# Patient Record
Sex: FEMALE | Race: WHITE | Employment: OTHER | URBAN - METROPOLITAN AREA
[De-identification: names, ages, dates, MRNs, and addresses within clinical notes are randomized per-mention and may not be internally consistent; named-entity substitution may affect disease eponyms.]

---

## 2017-01-05 ENCOUNTER — APPOINTMENT (OUTPATIENT)
Dept: CT IMAGING | Age: 72
End: 2017-01-05
Attending: EMERGENCY MEDICINE
Payer: MEDICARE

## 2017-01-05 ENCOUNTER — HOSPITAL ENCOUNTER (OUTPATIENT)
Age: 72
Setting detail: OBSERVATION
LOS: 1 days | Discharge: HOME | End: 2017-01-06
Attending: EMERGENCY MEDICINE | Admitting: INTERNAL MEDICINE
Payer: MEDICARE

## 2017-01-05 DIAGNOSIS — R47.02 DYSPHASIA: ICD-10-CM

## 2017-01-05 DIAGNOSIS — I63.9 CEREBROVASCULAR ACCIDENT (CVA), UNSPECIFIED MECHANISM (HCC): Primary | ICD-10-CM

## 2017-01-05 DIAGNOSIS — I65.21 CAROTID STENOSIS, RIGHT: ICD-10-CM

## 2017-01-05 PROBLEM — R47.01 APHASIA: Status: ACTIVE | Noted: 2017-01-05

## 2017-01-05 LAB
ALBUMIN SERPL BCP-MCNC: 2.9 G/DL (ref 3.2–4.6)
ALBUMIN/GLOB SERPL: 0.9 {RATIO} (ref 1.2–3.5)
ALP SERPL-CCNC: 65 U/L (ref 50–136)
ALT SERPL-CCNC: 24 U/L (ref 12–65)
ANION GAP BLD CALC-SCNC: 7 MMOL/L (ref 7–16)
AST SERPL W P-5'-P-CCNC: 13 U/L (ref 15–37)
BASOPHILS # BLD AUTO: 0 K/UL (ref 0–0.2)
BASOPHILS # BLD: 0 % (ref 0–2)
BILIRUB SERPL-MCNC: 0.4 MG/DL (ref 0.2–1.1)
BUN SERPL-MCNC: 29 MG/DL (ref 8–23)
CALCIUM SERPL-MCNC: 8.7 MG/DL (ref 8.3–10.4)
CHLORIDE SERPL-SCNC: 111 MMOL/L (ref 98–107)
CO2 SERPL-SCNC: 28 MMOL/L (ref 21–32)
CREAT SERPL-MCNC: 0.84 MG/DL (ref 0.6–1)
DIFFERENTIAL METHOD BLD: ABNORMAL
EOSINOPHIL # BLD: 0.2 K/UL (ref 0–0.8)
EOSINOPHIL NFR BLD: 6 % (ref 0.5–7.8)
ERYTHROCYTE [DISTWIDTH] IN BLOOD BY AUTOMATED COUNT: 14.5 % (ref 11.9–14.6)
GLOBULIN SER CALC-MCNC: 3.3 G/DL (ref 2.3–3.5)
GLUCOSE SERPL-MCNC: 79 MG/DL (ref 65–100)
HCT VFR BLD AUTO: 38.4 % (ref 35.8–46.3)
HGB BLD-MCNC: 12.2 G/DL (ref 11.7–15.4)
IMM GRANULOCYTES # BLD: 0 K/UL (ref 0–0.5)
IMM GRANULOCYTES NFR BLD AUTO: 0.3 % (ref 0–5)
INR PPP: 0.9 (ref 0.9–1.2)
LYMPHOCYTES # BLD AUTO: 34 % (ref 13–44)
LYMPHOCYTES # BLD: 1.1 K/UL (ref 0.5–4.6)
MCH RBC QN AUTO: 35.5 PG (ref 26.1–32.9)
MCHC RBC AUTO-ENTMCNC: 31.8 G/DL (ref 31.4–35)
MCV RBC AUTO: 111.6 FL (ref 79.6–97.8)
MONOCYTES # BLD: 0.2 K/UL (ref 0.1–1.3)
MONOCYTES NFR BLD AUTO: 7 % (ref 4–12)
NEUTS SEG # BLD: 1.6 K/UL (ref 1.7–8.2)
NEUTS SEG NFR BLD AUTO: 53 % (ref 43–78)
PLATELET # BLD AUTO: 122 K/UL (ref 150–450)
PMV BLD AUTO: 11.8 FL (ref 10.8–14.1)
POTASSIUM SERPL-SCNC: 4.2 MMOL/L (ref 3.5–5.1)
PROT SERPL-MCNC: 6.2 G/DL (ref 6.3–8.2)
PROTHROMBIN TIME: 10.2 SEC (ref 9.6–12)
RBC # BLD AUTO: 3.44 M/UL (ref 4.05–5.25)
SODIUM SERPL-SCNC: 146 MMOL/L (ref 136–145)
WBC # BLD AUTO: 3.1 K/UL (ref 4.3–11.1)

## 2017-01-05 PROCEDURE — 85610 PROTHROMBIN TIME: CPT | Performed by: EMERGENCY MEDICINE

## 2017-01-05 PROCEDURE — 93005 ELECTROCARDIOGRAM TRACING: CPT | Performed by: EMERGENCY MEDICINE

## 2017-01-05 PROCEDURE — 81015 MICROSCOPIC EXAM OF URINE: CPT | Performed by: EMERGENCY MEDICINE

## 2017-01-05 PROCEDURE — 81003 URINALYSIS AUTO W/O SCOPE: CPT | Performed by: EMERGENCY MEDICINE

## 2017-01-05 PROCEDURE — 70450 CT HEAD/BRAIN W/O DYE: CPT

## 2017-01-05 PROCEDURE — G0378 HOSPITAL OBSERVATION PER HR: HCPCS

## 2017-01-05 PROCEDURE — 85025 COMPLETE CBC W/AUTO DIFF WBC: CPT | Performed by: EMERGENCY MEDICINE

## 2017-01-05 PROCEDURE — 99218 HC RM OBSERVATION: CPT

## 2017-01-05 PROCEDURE — 99285 EMERGENCY DEPT VISIT HI MDM: CPT | Performed by: EMERGENCY MEDICINE

## 2017-01-05 PROCEDURE — 80053 COMPREHEN METABOLIC PANEL: CPT | Performed by: EMERGENCY MEDICINE

## 2017-01-05 NOTE — IP AVS SNAPSHOT
Current Discharge Medication List  
  
Take these medications at their scheduled times Dose & Instructions Dispensing Information Comments Morning Noon Evening Bedtime CENTRUM SILVER Tab tablet Generic drug:  multivitamins-minerals-lutein Your next dose is:  Tomorrow Dose:  1 Tab Take 1 Tab by mouth daily. Refills:  0  
     
  
   
   
   
  
 clopidogrel 75 mg Tab Commonly known as:  PLAVIX Your next dose is: Today Dose:  75 mg Take 1 Tab by mouth daily. Quantity:  90 Tab Refills:  0  
     
   
   
   
  
 clorazepate 3.75 mg tablet Commonly known as:  TRANXENE Your next dose is:  Tomorrow Dose:  3.75 mg Take 3.75 mg by mouth two (2) times a day. Refills:  0  
     
  
   
   
  
   
  
 colestipol 1 gram tablet Commonly known as:  COLESTID Your next dose is:  Tomorrow Dose:  1 g Take 1 Tab by mouth two (2) times a day. Quantity:  60 Tab Refills:  6  
     
  
   
   
  
   
  
 dexamethasone 4 mg tablet Commonly known as:  DECADRON Dose:  4 mg Take 4 mg by mouth every Monday. Refills:  0  
     
   
   
   
  
 esomeprazole 40 mg capsule Commonly known as:  NexIUM Your next dose is:  Tomorrow Dose:  40 mg Take 1 Cap by mouth daily. Quantity:  30 Cap Refills:  12 LYRICA 100 mg capsule Generic drug:  pregabalin Your next dose is:  Tomorrow Dose:  100 mg  
100 mg two (2) times a day. Refills:  0  
     
  
   
   
   
  
 mirtazapine 30 mg tablet Commonly known as:  Gonzalez Doles Your next dose is:  Tomorrow Dose:  30 mg Take 1 Tab by mouth nightly. Quantity:  30 Tab Refills:  6 OTHER Your next dose is:  Tomorrow Dose:  4 Cap Take 4 Caps by mouth daily. Dry eye omega Refills:  0  
     
  
   
   
   
  
 potassium chloride 10 mEq tablet Commonly known as:  K-DUR, KLOR-CON  
 Your next dose is:  Tomorrow Dose:  10 mEq Take 1 Tab by mouth three (3) times daily. Quantity:  90 Tab Refills:  1  
     
  
   
  
   
  
   
  
 pravastatin 20 mg tablet Commonly known as:  PRAVACHOL Your next dose is: Today Dose:  40 mg Take 2 Tabs by mouth nightly. Quantity:  90 Tab Refills:  0 PROBIOTIC 4X 10-15 mg Tbec Generic drug:  B.infantis-B.ani-B.long-B.bifi Your next dose is:  Tomorrow Dose:  1 Tab Take 1 Tab by mouth daily. Refills:  0  
     
  
   
   
   
  
 RESTASIS 0.05 % ophthalmic emulsion Generic drug:  cycloSPORINE Your next dose is:  Tomorrow Dose:  1 Drop Administer 1 Drop to both eyes two (2) times a day. Refills:  0 REVLIMID 15 mg Cap Generic drug:  lenalidomide Dose:  1 Cap 1 Cap every twenty-one (21) days. Indications: MULTIPLE MYELOMA Refills:  0  
     
   
   
   
  
 VITAMIN B-12 1,000 mcg tablet Generic drug:  cyanocobalamin Your next dose is:  Tomorrow Dose:  1000 mcg Take 1,000 mcg by mouth daily. Refills:  0  
     
  
   
   
   
  
 VITAMIN B-6 100 mg tablet Generic drug:  pyridoxine (vitamin B6) Your next dose is:  Tomorrow Dose:  100 mg Take 100 mg by mouth two (2) times a day. Refills:  0 ASK your doctor about these medications Dose & Instructions Dispensing Information Comments Morning Noon Evening Bedtime  
 aspirin 325 mg tablet Commonly known as:  ASPIRIN Dose:  325 mg Take 325 mg by mouth daily. Refills:  0 Where to Get Your Medications Information about where to get these medications is not yet available ! Ask your nurse or doctor about these medications  
  clopidogrel 75 mg Tab  
 pravastatin 20 mg tablet

## 2017-01-05 NOTE — IP AVS SNAPSHOT
Faustina Oro 
 
 
 [address] 
[phone] Patient: Ihsan Monson MRN: [id number] You are allergic to the following No active allergies Immunizations Administered for This Admission Name Date Influenza Vaccine (Quad) PF  Deferred () Recent Documentation Height Weight Breastfeeding? BMI OB Status Smoking Status 1.549 m 56.7 kg No 23.62 kg/m2 Postmenopausal Never Smoker Emergency Contacts Name Discharge Info Relation Home Work Mobile Abe Nick  Spouse [3] [phone] About your hospitalization You were admitted on:  January 5, 2017 You last received care in the:  [facility] 7 MED SURG You were discharged on:  January 6, 2017 Unit phone number:  [phone] Why you were hospitalized Your primary diagnosis was:  Aphasia Your diagnoses also included:  Igg Multiple Myeloma (Hcc), Peripheral Neuropathy Due To Chemotherapy (Hcc) Providers Seen During Your Hospitalizations Provider Role Specialty Primary office phone Mariella Hernandez MD Attending Provider Emergency Medicine [phone] Mai Youngblood DO Attending Provider Internal Medicine [phone] Your Primary Care Physician (PCP) Primary Care Physician Office Phone Office Fax [facility] [phone] Follow-up Information Follow up With Details Comments Contact Info [employer]   A fixed wheel rolling walker has been ordered for you. Please contact this office to arrange pick-up or delivery of the walker. [address] [facility] [address] [facility], [address] [place] [address],First Floor 
[phone] Devin  You have been referred for outpatient physical therapy for balance rehab. They will contact you to schedule the initial appointment. [address] [facility], [address] [facility],First Floor 
[phone]  Jorge Armstrong MD  Please call Monday for follow up appointment for 1 week [address] [facility] [address] 
[phone] [facility] Cardiology  Please call Monday to schedule to have a event moniter placed [address] [place] [phone] [facility] 
[phone] Your Appointments Tuesday January 10, 2017 10:45 AM EST Follow Up with Zayra Reina MD  
[facility] Internal Medicine ([facility] INTERNAL MEDICINE) [address] Jaclyn Concepcion [facility] [address]  
[phone] Thursday January 26, 2017 10:45 AM EST Follow Up with Zayra Reina MD  
[facility] Internal Medicine ([facility] INTERNAL MEDICINE) [address] Jaclyn Concepcion [facility] [address]  
[phone] Current Discharge Medication List  
  
START taking these medications Dose & Instructions Dispensing Information Comments Morning Noon Evening Bedtime  
 clopidogrel 75 mg Tab Commonly known as:  PLAVIX Your next dose is: Today Dose:  75 mg Take 1 Tab by mouth daily. Quantity:  90 Tab Refills:  0 CONTINUE these medications which have CHANGED Dose & Instructions Dispensing Information Comments Morning Noon Evening Bedtime  
 clorazepate 3.75 mg tablet Commonly known as:  TRANXENE What changed:  Another medication with the same name was removed. Continue taking this medication, and follow the directions you see here. Your next dose is:  Tomorrow Dose:  3.75 mg Take 3.75 mg by mouth two (2) times a day. Refills:  0  
     
  
   
   
  
   
  
 pravastatin 20 mg tablet Commonly known as:  PRAVACHOL What changed:  See the new instructions. Your next dose is: Today Dose:  40 mg Take 2 Tabs by mouth nightly. Quantity:  90 Tab Refills:  0  
     
   
   
   
  
  
 VITAMIN B-6 100 mg tablet Generic drug:  pyridoxine (vitamin B6) What changed:  Another medication with the same name was removed.  Continue taking this medication, and follow the directions you see here. Your next dose is:  Tomorrow Dose:  100 mg Take 100 mg by mouth two (2) times a day. Refills:  0 CONTINUE these medications which have NOT CHANGED Dose & Instructions Dispensing Information Comments Morning Noon Evening Bedtime CENTRUM SILVER Tab tablet Generic drug:  multivitamins-minerals-lutein Your next dose is:  Tomorrow Dose:  1 Tab Take 1 Tab by mouth daily. Refills:  0  
     
  
   
   
   
  
 colestipol 1 gram tablet Commonly known as:  COLESTID Your next dose is:  Tomorrow Dose:  1 g Take 1 Tab by mouth two (2) times a day. Quantity:  60 Tab Refills:  6  
     
  
   
   
  
   
  
 dexamethasone 4 mg tablet Commonly known as:  DECADRON Dose:  4 mg Take 4 mg by mouth every Monday. Refills:  0  
     
   
   
   
  
 esomeprazole 40 mg capsule Commonly known as:  NexIUM Your next dose is:  Tomorrow Dose:  40 mg Take 1 Cap by mouth daily. Quantity:  30 Cap Refills:  12 LYRICA 100 mg capsule Generic drug:  pregabalin Your next dose is:  Tomorrow Dose:  100 mg  
100 mg two (2) times a day. Refills:  0  
     
  
   
   
   
  
 mirtazapine 30 mg tablet Commonly known as:  Eitan Carlos Your next dose is:  Tomorrow Dose:  30 mg Take 1 Tab by mouth nightly. Quantity:  30 Tab Refills:  6 OTHER Your next dose is:  Tomorrow Dose:  4 Cap Take 4 Caps by mouth daily. Dry eye omega Refills:  0  
     
  
   
   
   
  
 potassium chloride 10 mEq tablet Commonly known as:  K-DUR, KLOR-CON Your next dose is:  Tomorrow Dose:  10 mEq Take 1 Tab by mouth three (3) times daily. Quantity:  90 Tab Refills:  1 PROBIOTIC 4X 10-15 mg Tbec Generic drug:  B.infantis-B.ani-B.long-B.bifi Your next dose is:  Tomorrow Dose:  1 Tab Take 1 Tab by mouth daily. Refills:  0  
     
  
   
   
   
  
 RESTASIS 0.05 % ophthalmic emulsion Generic drug:  cycloSPORINE Your next dose is:  Tomorrow Dose:  1 Drop Administer 1 Drop to both eyes two (2) times a day. Refills:  0 REVLIMID 15 mg Cap Generic drug:  lenalidomide Dose:  1 Cap 1 Cap every twenty-one (21) days. Indications: MULTIPLE MYELOMA Refills:  0  
     
   
   
   
  
 VITAMIN B-12 1,000 mcg tablet Generic drug:  cyanocobalamin Your next dose is:  Tomorrow Dose:  1000 mcg Take 1,000 mcg by mouth daily. Refills:  0 STOP taking these medications CALCIUM 600 + D 600-125 mg-unit Tab Generic drug:  calcium-cholecalciferol (d3) CALCIUM CITRATE + D PO  
   
  
 I-CAPS PO  
   
  
  
ASK your doctor about these medications Dose & Instructions Dispensing Information Comments Morning Noon Evening Bedtime  
 aspirin 325 mg tablet Commonly known as:  ASPIRIN Dose:  325 mg Take 325 mg by mouth daily. Refills:  0 Where to Get Your Medications Information on where to get these meds will be given to you by the nurse or doctor. ! Ask your nurse or doctor about these medications  
  clopidogrel 75 mg Tab  
 pravastatin 20 mg tablet Discharge Instructions Activity: Activity as tolerated 
  
Diet: Cardiac Diet Stroke: After Your Visit Your Care Instructions You have had a stroke. Risk factors for stroke include being overweight, smoking, and sedentary lifestyle. This means that the blood flow to a part of your brain was blocked for some time, which damages the nerve cells in that part of the brain. The part of your body controlled by that part of your brain may not function properly now. The brain is an amazing organ that can heal itself to some degree. The stroke you had damaged part of your brain, but other parts of your brain may take over in some way for the damaged areas. You have already started this process. Going home may be hard for you and your family. The more you can try to do for yourself, the better. Remember to take each day one at a time. Follow-up care is a key part of your treatment and safety. Be sure to make and go to all appointments, and call your doctor if you are having problems. Its also a good idea to know your test results and keep a list of the medicines you take. How can you care for yourself at home? Enter a stroke rehabilitation (rehab) program, if your doctor recommends it. Physical, speech, and occupational therapies can help you manage bathing, dressing, eating, and other basics of daily living. Eat a heart-healthy diet that is low in cholesterol, saturated fat, and salt. Eat lots of fresh fruits and vegetables and foods high in fiber. Increase your activities slowly. Take short rest breaks when you get tired. Gradually increase the amount you walk. Start out by walking a little more than you did the day before. Do not drive until your doctor says it is okay. It is normal to feel sad or depressed after a stroke. If the blues last, talk to your doctor. If you are having problems with urine leakage, go to the bathroom at regular times, including when you first wake up and at bedtime. Also, limit fluids after dinner. If you are constipated, drink plenty of fluids, enough so that your urine is light yellow or clear like water. If you have kidney, heart, or liver disease and have to limit fluids, talk with your doctor before you increase the amount of fluids you drink. Set up a regular time for using the toilet.  If you continue to have constipation, your doctor may suggest using a bulking agent, such as Metamucil, or a stool softener, laxative, or enema. Medicines Take your medicines exactly as prescribed. Call your doctor if you think you are having a problem with your medicine. You may be taking several medicines. ACE (angiotensin-converting enzyme) inhibitors, angiotensin II receptor blockers (ARBs), beta-blockers, diuretics (water pills), and calcium channel blockers control your blood pressure. Statins help lower cholesterol. Your doctor may also prescribe medicines for depression, pain, sleep problems, anxiety, or agitation. If your doctor has given you medicine that prevents blood clots, such as warfarin (Coumadin), aspirin combined with extended-release dipyridamole (Aggrenox), clopidogrel (Plavix), or aspirin to prevent another stroke, you should: 
Tell your dentist, pharmacist, and other health professionals that you take these medicines. Watch for unusual bruising or bleeding, such as blood in your urine, red or black stools, or bleeding from your nose or gums. Get regular blood tests to check your clotting time if you are taking Coumadin. Wear medical alert jewelry that says you take blood thinners. You can buy this at most [employer]. Do not take any over-the-counter medicines or herbal products without talking to your doctor first. 
If you take birth control pills or hormone replacement therapy, talk to your doctor about whether they are right for you. For family members and caregivers Make the home safe. Set up a room so that your loved one does not have to climb stairs. Be sure the bathroom is on the same floor. Move throw rugs and furniture that could cause falls, and make sure that the lighting is good. Put grab bars and seats in tubs and showers. Find out what your loved one can do and what he or she needs help with. Try not to do things for your loved one that your loved one can do on his or her own. Help him or her learn and practice new skills. Visit and talk with your loved one often.  Try doing activities together that you both enjoy, such as playing cards or board games. Keep in touch with your loved one's friends as much as you can, and encourage them to visit. Take care of yourself. Do not try to do everything yourself. Ask other family members to help. Eat well, get enough rest, and take time to do things that you enjoy. Keep up with your own doctor visits, and make sure to take your medicines regularly. Get out of the house as much as you can. Join a local support group. Find out if you qualify for home health care visits to help with rehab or for adult day care. When should you call for help? Call 911 anytime you think you may need emergency care. For example, call if: 
You have signs of another stroke. These may include: 
Sudden numbness, paralysis, or weakness in your face, arm, or leg, especially on only one side of your body. New problems with walking or balance. Sudden vision changes. Drooling or slurred speech. New problems speaking or understanding simple statements, or you feel confused. A sudden, severe headache that is different from past headaches. Call 911 even if these symptoms go away in a few minutes. You cough up blood. You vomit blood or what looks like coffee grounds. You pass maroon or very bloody stools. Call your doctor now or seek immediate medical care if: 
You have new bruises or blood spots under your skin. You have a nosebleed. Your gums bleed when you brush your teeth. You have blood in your urine. Your stools are black and tarlike or have streaks of blood. You have vaginal bleeding when you are not having your period, or heavy period bleeding. You have new symptoms that may be related to your stroke, such as falls or trouble swallowing. Watch closely for changes in your health, and be sure to contact your doctor if you have any problems. Where can you learn more? Go to DealExplorer.be Enter F632  in the search box to learn more about \"Stroke: After Your Visit\". © 0451-3189 Healthwise, [employer]. Care instructions adapted under license by Bran Valera (which disclaims liability or warranty for this information). This care instruction is for use with your licensed healthcare professional. If you have questions about a medical condition or this instruction, always ask your healthcare professional. Duey Terry any warranty or liability for your use of this information. DISCHARGE SUMMARY from Nurse The following personal items are in your possession at time of discharge: 
 
Dental Appliances: None Visual Aid: Glasses, At home Home Medications: None Jewelry: Ring, With patient Clothing: None Other Valuables: None Personal Items Sent to Safe: none PATIENT INSTRUCTIONS: 
 
After general anesthesia or intravenous sedation, for 24 hours or while taking prescription Narcotics: · Limit your activities · Do not drive and operate hazardous machinery · Do not make important personal or business decisions · Do  not drink alcoholic beverages · If you have not urinated within 8 hours after discharge, please contact your surgeon on call. Report the following to your surgeon: 
· Excessive pain, swelling, redness or odor of or around the surgical area · Temperature over 100.5 · Nausea and vomiting lasting longer than 4 hours or if unable to take medications · Any signs of decreased circulation or nerve impairment to extremity: change in color, persistent  numbness, tingling, coldness or increase pain · Any questions What to do at Home: 
Recommended activity: Activity as tolerated, If you experience any of the following symptoms see discharge instructions, please follow up with surgeon. *  Please give a list of your current medications to your Primary Care Provider.  
 
*  Please update this list whenever your medications are discontinued, doses are 
 changed, or new medications (including over-the-counter products) are added. *  Please carry medication information at all times in case of emergency situations. These are general instructions for a healthy lifestyle: No smoking/ No tobacco products/ Avoid exposure to second hand smoke Surgeon General's Warning:  Quitting smoking now greatly reduces serious risk to your health. Obesity, smoking, and sedentary lifestyle greatly increases your risk for illness A healthy diet, regular physical exercise & weight monitoring are important for maintaining a healthy lifestyle You may be retaining fluid if you have a history of heart failure or if you experience any of the following symptoms:  Weight gain of 3 pounds or more overnight or 5 pounds in a week, increased swelling in our hands or feet or shortness of breath while lying flat in bed. Please call your doctor as soon as you notice any of these symptoms; do not wait until your next office visit. Recognize signs and symptoms of STROKE: 
 
F-face looks uneven A-arms unable to move or move unevenly S-speech slurred or non-existent T-time-call 911 as soon as signs and symptoms begin-DO NOT go Back to bed or wait to see if you get better-TIME IS BRAIN. Warning Signs of HEART ATTACK Call 911 if you have these symptoms: 
? Chest discomfort. Most heart attacks involve discomfort in the center of the chest that lasts more than a few minutes, or that goes away and comes back. It can feel like uncomfortable pressure, squeezing, fullness, or pain. ? Discomfort in other areas of the upper body. Symptoms can include pain or discomfort in one or both arms, the back, neck, jaw, or stomach. ? Shortness of breath with or without chest discomfort. ? Other signs may include breaking out in a cold sweat, nausea, or lightheadedness. Don't wait more than five minutes to call [address]!  Fast action can save your life. Calling 911 is almost always the fastest way to get lifesaving treatment. Emergency Medical Services staff can begin treatment when they arrive  up to an hour sooner than if someone gets to the hospital by car. The discharge information has been reviewed with the patient. The patient verbalized understanding. Discharge medications reviewed with the patient and appropriate educational materials and side effects teaching were provided. Discharge Orders None ACO Transitions of Care Introducing [employer] [address] offers a voluntary care coordination program to provide high quality service and care to [facility] fee-for-service beneficiaries. Javid Alonso was designed to help you enhance your health and well-being through the following services: ? Transitions of Care  support for individuals who are transitioning from one care setting to another (example: Hospital to home). ? Chronic and Complex Care Coordination  support for individuals and caregivers of those with serious or chronic illnesses or with more than one chronic (ongoing) condition and those who take a number of different medications. If you meet specific medical criteria, a [address] [place] [address] Rd may call you directly to coordinate your care with your primary care physician and your other care providers. For questions about the [facility] programs, please, contact your physicians office. For general questions or additional information about Accountable Care Organizations: 
Please visit www.medicare.gov/acos. html or call 1-800-MEDICARE (4-534.424.2713) TTY users should call 8-933.524.4929. [employer] Announcement We are excited to announce that we are making your provider's discharge notes available to you in Easiaid.   You will see these notes when they are completed and signed by the physician that discharged you from your recent hospital stay. If you have any questions or concerns about any information you see in Digital Legends, please call the Health Information Department where you were seen or reach out to your Primary Care Provider for more information about your plan of care. Introducing [facility]! [facility] introduces Digital Legends patient portal. Now you can access parts of your medical record, email your doctor's office, and request medication refills online. 1. In your internet browser, go to [email or web address] 2. Click on the First Time User? Click Here link in the Sign In box. You will see the New Member Sign Up page. 3. Enter your Digital Legends Access Code exactly as it appears below. You will not need to use this code after youve completed the sign-up process. If you do not sign up before the expiration date, you must request a new code. · Digital Legends Access Code: [email or web address] Expires: 2/1/2017 10:00 AM 
 
4. Enter the last four digits of your Social Security Number (xxxx) and Date of Birth (mm/dd/yyyy) as indicated and click Submit. You will be taken to the next sign-up page. 5. Create a Digital Legends ID. This will be your Digital Legends login ID and cannot be changed, so think of one that is secure and easy to remember. 6. Create a [employer]ends password. You can change your password at any time. 7. Enter your Password Reset Question and Answer. This can be used at a later time if you forget your password. 8. Enter your e-mail address. You will receive e-mail notification when new information is available in [address] 9. Click Sign Up. You can now view and download portions of your medical record. 10. Click the Download Summary menu link to download a portable copy of your medical information.  
 
If you have questions, please visit the Frequently Asked Questions section of the [email or web address]. Remember, MyChart is NOT to be used for urgent needs. For medical emergencies, dial 911. Now available from your iPhone and Android! General Information Please provide this summary of care documentation to your next provider. Patient Signature:  ____________________________________________________________ Date:  ____________________________________________________________  
  
Gwenyth Wharton Provider Signature:  ____________________________________________________________ Date:  ____________________________________________________________

## 2017-01-05 NOTE — Clinical Note
Status[de-identified] Inpatient [101] Type of Bed: Telemetry Remote [29] Inpatient Hospitalization Certified Necessary for the Following Reasons: 3. Patient receiving treatment that can only be provided in an inpatient setting (further clarification in H&P documentation) Admitting Diagnosis: Aphasia Stevenu.Link. 3. ICD-9-CM] Admitting Physician: Jerri Rogers Attending Physician: Jerri Rogers Estimated Length of Stay: > or = to 2 Midnights Discharge Plan[de-identified] Home with Office Follow-up

## 2017-01-06 ENCOUNTER — APPOINTMENT (OUTPATIENT)
Dept: MRI IMAGING | Age: 72
End: 2017-01-06
Attending: INTERNAL MEDICINE
Payer: MEDICARE

## 2017-01-06 ENCOUNTER — APPOINTMENT (OUTPATIENT)
Dept: ULTRASOUND IMAGING | Age: 72
End: 2017-01-06
Attending: INTERNAL MEDICINE
Payer: MEDICARE

## 2017-01-06 VITALS
DIASTOLIC BLOOD PRESSURE: 64 MMHG | OXYGEN SATURATION: 100 % | TEMPERATURE: 98 F | SYSTOLIC BLOOD PRESSURE: 150 MMHG | WEIGHT: 125 LBS | HEIGHT: 61 IN | RESPIRATION RATE: 19 BRPM | BODY MASS INDEX: 23.6 KG/M2 | HEART RATE: 70 BPM

## 2017-01-06 LAB
ATRIAL RATE: 68 BPM
BACTERIA URNS QL MICRO: ABNORMAL /HPF
CALCULATED P AXIS, ECG09: 55 DEGREES
CALCULATED R AXIS, ECG10: -6 DEGREES
CALCULATED T AXIS, ECG11: 9 DEGREES
CASTS URNS QL MICRO: 0 /LPF
CRYSTALS URNS QL MICRO: 0 /LPF
DIAGNOSIS, 93000: NORMAL
DIASTOLIC BP, ECG02: NORMAL MMHG
EPI CELLS #/AREA URNS HPF: 0 /HPF
MUCOUS THREADS URNS QL MICRO: ABNORMAL /LPF
P-R INTERVAL, ECG05: 182 MS
Q-T INTERVAL, ECG07: 392 MS
QRS DURATION, ECG06: 86 MS
QTC CALCULATION (BEZET), ECG08: 416 MS
RBC #/AREA URNS HPF: ABNORMAL /HPF
SYSTOLIC BP, ECG01: NORMAL MMHG
VENTRICULAR RATE, ECG03: 68 BPM
WBC URNS QL MICRO: ABNORMAL /HPF

## 2017-01-06 PROCEDURE — 96361 HYDRATE IV INFUSION ADD-ON: CPT

## 2017-01-06 PROCEDURE — G8997 SWALLOW GOAL STATUS: HCPCS

## 2017-01-06 PROCEDURE — 74011250637 HC RX REV CODE- 250/637: Performed by: INTERNAL MEDICINE

## 2017-01-06 PROCEDURE — 74011250636 HC RX REV CODE- 250/636: Performed by: INTERNAL MEDICINE

## 2017-01-06 PROCEDURE — 96372 THER/PROPH/DIAG INJ SC/IM: CPT

## 2017-01-06 PROCEDURE — G0378 HOSPITAL OBSERVATION PER HR: HCPCS

## 2017-01-06 PROCEDURE — 96360 HYDRATION IV INFUSION INIT: CPT

## 2017-01-06 PROCEDURE — G8988 SELF CARE GOAL STATUS: HCPCS

## 2017-01-06 PROCEDURE — G8989 SELF CARE D/C STATUS: HCPCS

## 2017-01-06 PROCEDURE — G8987 SELF CARE CURRENT STATUS: HCPCS

## 2017-01-06 PROCEDURE — G8978 MOBILITY CURRENT STATUS: HCPCS

## 2017-01-06 PROCEDURE — 99218 HC RM OBSERVATION: CPT

## 2017-01-06 PROCEDURE — 97161 PT EVAL LOW COMPLEX 20 MIN: CPT

## 2017-01-06 PROCEDURE — 97530 THERAPEUTIC ACTIVITIES: CPT

## 2017-01-06 PROCEDURE — C8929 TTE W OR WO FOL WCON,DOPPLER: HCPCS

## 2017-01-06 PROCEDURE — 70551 MRI BRAIN STEM W/O DYE: CPT

## 2017-01-06 PROCEDURE — 74011000250 HC RX REV CODE- 250: Performed by: INTERNAL MEDICINE

## 2017-01-06 PROCEDURE — 92610 EVALUATE SWALLOWING FUNCTION: CPT

## 2017-01-06 PROCEDURE — G8979 MOBILITY GOAL STATUS: HCPCS

## 2017-01-06 PROCEDURE — 97166 OT EVAL MOD COMPLEX 45 MIN: CPT

## 2017-01-06 PROCEDURE — G8980 MOBILITY D/C STATUS: HCPCS

## 2017-01-06 PROCEDURE — 93880 EXTRACRANIAL BILAT STUDY: CPT

## 2017-01-06 PROCEDURE — G8996 SWALLOW CURRENT STATUS: HCPCS

## 2017-01-06 RX ORDER — SODIUM CHLORIDE 0.9 % (FLUSH) 0.9 %
5-10 SYRINGE (ML) INJECTION AS NEEDED
Status: DISCONTINUED | OUTPATIENT
Start: 2017-01-06 | End: 2017-01-06 | Stop reason: HOSPADM

## 2017-01-06 RX ORDER — ASPIRIN 325 MG
325 TABLET ORAL DAILY
Status: DISCONTINUED | OUTPATIENT
Start: 2017-01-06 | End: 2017-01-06 | Stop reason: HOSPADM

## 2017-01-06 RX ORDER — SODIUM CHLORIDE 9 MG/ML
75 INJECTION, SOLUTION INTRAVENOUS CONTINUOUS
Status: DISCONTINUED | OUTPATIENT
Start: 2017-01-06 | End: 2017-01-06

## 2017-01-06 RX ORDER — PREGABALIN 50 MG/1
100 CAPSULE ORAL 2 TIMES DAILY
Status: DISCONTINUED | OUTPATIENT
Start: 2017-01-06 | End: 2017-01-06 | Stop reason: HOSPADM

## 2017-01-06 RX ORDER — PRAVASTATIN SODIUM 20 MG/1
40 TABLET ORAL
Qty: 90 TAB | Refills: 0 | Status: SHIPPED | OUTPATIENT
Start: 2017-01-06 | End: 2017-01-10 | Stop reason: DRUGHIGH

## 2017-01-06 RX ORDER — HEPARIN SODIUM 5000 [USP'U]/ML
5000 INJECTION, SOLUTION INTRAVENOUS; SUBCUTANEOUS EVERY 8 HOURS
Status: DISCONTINUED | OUTPATIENT
Start: 2017-01-06 | End: 2017-01-06 | Stop reason: HOSPADM

## 2017-01-06 RX ORDER — CYCLOSPORINE 0.5 MG/ML
1 EMULSION OPHTHALMIC 2 TIMES DAILY
Status: DISCONTINUED | OUTPATIENT
Start: 2017-01-06 | End: 2017-01-06 | Stop reason: HOSPADM

## 2017-01-06 RX ORDER — SODIUM CHLORIDE 0.9 % (FLUSH) 0.9 %
5-10 SYRINGE (ML) INJECTION EVERY 8 HOURS
Status: DISCONTINUED | OUTPATIENT
Start: 2017-01-06 | End: 2017-01-06 | Stop reason: HOSPADM

## 2017-01-06 RX ORDER — CLORAZEPATE DIPOTASSIUM 3.75 MG/1
3.75 TABLET ORAL DAILY
COMMUNITY
End: 2017-03-08 | Stop reason: SDUPTHER

## 2017-01-06 RX ORDER — PRAVASTATIN SODIUM 20 MG/1
20 TABLET ORAL
Status: DISCONTINUED | OUTPATIENT
Start: 2017-01-06 | End: 2017-01-06 | Stop reason: HOSPADM

## 2017-01-06 RX ORDER — PYRIDOXINE HCL (VITAMIN B6) 100 MG
100 TABLET ORAL 2 TIMES DAILY
COMMUNITY
End: 2017-07-19 | Stop reason: SDUPTHER

## 2017-01-06 RX ORDER — CLOPIDOGREL BISULFATE 75 MG/1
75 TABLET ORAL DAILY
Qty: 90 TAB | Refills: 0 | Status: SHIPPED | OUTPATIENT
Start: 2017-01-06 | End: 2017-04-07 | Stop reason: SDUPTHER

## 2017-01-06 RX ADMIN — ASPIRIN 325 MG ORAL TABLET 325 MG: 325 PILL ORAL at 02:49

## 2017-01-06 RX ADMIN — Medication 10 ML: at 02:55

## 2017-01-06 RX ADMIN — Medication 10 ML: at 14:00

## 2017-01-06 RX ADMIN — HEPARIN SODIUM 5000 UNITS: 5000 INJECTION, SOLUTION INTRAVENOUS; SUBCUTANEOUS at 02:55

## 2017-01-06 RX ADMIN — PERFLUTREN 1 ML: 6.52 INJECTION, SUSPENSION INTRAVENOUS at 14:00

## 2017-01-06 RX ADMIN — SODIUM CHLORIDE 75 ML/HR: 900 INJECTION, SOLUTION INTRAVENOUS at 02:50

## 2017-01-06 RX ADMIN — Medication 10 ML: at 06:08

## 2017-01-06 RX ADMIN — PRAVASTATIN SODIUM 20 MG: 20 TABLET ORAL at 02:49

## 2017-01-06 NOTE — PROGRESS NOTES
Problem: Dysphagia (Adult)  Goal: *Acute Goals and Plan of Care (Insert Text)  STG: Pt will consume a regular diet without signs/sx aspiration 100%. STG: Pt will participate in a ST evaluation x1. LTG: Pt will tolerate least restrictive diet without signs/sx aspiration 100% for safe swallow function. OBSERVATION SPEECH LANGUAGE PATHOLOGY: BEDSIDE SWALLOW NOTE: INITIAL ASSESSMENT     NAME/AGE/GENDER: Danisha Escobedo is a 66 y.o. female  DATE: 1/6/2017  PRIMARY DIAGNOSIS: Aphasia  Aphasia       ICD-10: Treatment Diagnosis: dysphagia, pharyngeal 13.13  INTERDISCIPLINARY COLLABORATION: Registered Nurse  PRECAUTIONS/ALLERGIES: Review of patient's allergies indicates no known allergies. ASSESSMENT:   Based on the objective data described below, Ms. Shasha Santiago presents with possible dysphagia. Pt given trials thin liquids, mixed consistency and solids. Pt with delayed throat clear x2 with mixed though they did not appear to be due to aspiration. No signs/sx aspiration observed with other consistencies. Recommend continuing with regular diet. Pt oriented x4. Difficulties with stating 2017 as she kept stating \"19\". Given time, she was able to state the correct year independently. She then stated she kept wanting to say 1960 but knew that wasn't right. She reported her speech has improved but it hasn't returned to baseline. No other deficits noted with speech. Patient will benefit from skilled intervention to address the below impairments. ?????? ? ? This section established at most recent assessment??????????  PROBLEM LIST (Impairments causing functional limitations):  1. Dysphagia  REHABILITATION POTENTIAL FOR STATED GOALS: GOOD      PLAN OF CARE:   Patient will benefit from skilled intervention to address the following impairments.   RECOMMENDATIONS AND PLANNED INTERVENTIONS (Benefits and precautions of therapy have been discussed with the patient.):  · continue prescribed diet  MEDICATIONS:  · With liquid  COMPENSATORY STRATEGIES/MODIFICATIONS INCLUDING:  · None  OTHER RECOMMENDATIONS (including follow up treatment recommendations):   · po trials  · ST evaluation  RECOMMENDED DIET MODIFICATIONS DISCUSSED WITH:  · Nursing  · Patient  FREQUENCY/DURATION: Continue to follow patient 3 times a week for duration of hospital stay to address above goals. RECOMMENDED REHABILITATION/EQUIPMENT: (at time of discharge pending progress):   to be determined. SUBJECTIVE:   Pt cooperative. History of Present Injury/Illness: Ms. Torey Sharp  has a past medical history of Anemia; Anxiety; Back ache; Benign paroxysmal positional vertigo; Cancer ([facility] [address] Contact dermatitis and other eczema, due to unspecified cause; Disease of esophagus; DVT (deep venous thrombosis) ([facility] [address] (); Dyslipidemia; Edema; Esophageal reflux; FHx: malignant neoplasm of gastrointestinal tract; GERD (gastroesophageal reflux disease); Herpes zoster; History of autologous stem cell transplant ([facility] [address] (10/2013); HLD (hyperlipidemia); Hypercholesterolemia; Multiple myeloma ([facility] [address] (); Neuropathy; Peripheral neuropathy ([facility] [address] Primary hypercoagulable state ([facility] [address] Rosacea; Rosacea; Shingles; Shingles (); and Stroke [facility]). She also has no past medical history of Chronic kidney disease. .   She also  has a past surgical history that includes  section; tubal ligation; colonoscopy (); other surgical (3/06); endoscopy (3/08); heent (2014); and cholecystectomy (2016). Present Symptoms: aphasia    Pain Intensity 1: 0  Current Medications:   No current facility-administered medications on file prior to encounter. Current Outpatient Prescriptions on File Prior to Encounter   Medication Sig Dispense Refill    pravastatin (PRAVACHOL) 20 mg tablet TAKE 1 TABLET BY MOUTH AT BEDTIME. 90 Tab 4    colestipol (COLESTID) 1 gram tablet Take 1 Tab by mouth two (2) times a day.  60 Tab 6    mirtazapine (REMERON) 30 mg tablet Take 1 Tab by mouth nightly. 30 Tab 6    potassium chloride (K-DUR, KLOR-CON) 10 mEq tablet Take 1 Tab by mouth three (3) times daily. 90 Tab 1    esomeprazole (NEXIUM) 40 mg capsule Take 1 Cap by mouth daily. 30 Cap 12    clorazepate (TRANXENE) 3.75 mg tablet Take 1 Tab by mouth two (2) times a day. Max Daily Amount: 7.5 mg. (Patient taking differently: Take 3.75 mg by mouth nightly.) 60 Tab 5    LYRICA 100 mg capsule 100 mg two (2) times a day.  cycloSPORINE (RESTASIS) 0.05 % ophthalmic emulsion Administer 1 Drop to both eyes two (2) times a day.  ANTIOX#10/OM3/DHA/EPA/LUT/ZEAX (I-CAPS PO) Take  by mouth.  CALCIUM CITRATE/VITAMIN D3 (CALCIUM CITRATE + D PO) Take  by mouth.  dexamethasone (DECADRON) 4 mg tablet Take 4 mg by mouth every seven (7) days.  REVLIMID 15 mg cap 1 Cap every twenty-one (21) days. Indications: MULTIPLE MYELOMA        aspirin (ASPIRIN) 325 mg tablet Take 325 mg by mouth daily.  pyridoxine (VITAMIN B-6) 50 mg tablet Take 50 mg by mouth two (2) times a day.  cyanocobalamin (VITAMIN B-12) 1,000 mcg tablet Take 1,000 mcg by mouth daily.  multivitamins-minerals-lutein (CENTRUM SILVER) Tab Take 1 Tab by mouth daily. Current Dietary Status:  Regular            History of reflux:   · Reflux medication:  Social History/Home Situation: independent          OBJECTIVE:   Respiratory Status:  Room air     CXR Results: not ordered  CT Results: No Acute Abnormality  Oral Motor Structure/Speech:  Oral-Motor Structure/Motor Speech  Labial: No impairment  Dentition: Intact, Natural  Oral Hygiene: adequate  Lingual: No impairment     Cognitive and Communication Status:  Neurologic State: Alert  Orientation Level: Oriented X4  Cognition: Appropriate decision making; Appropriate for age attention/concentration; Appropriate safety awareness; Follows commands              BEDSIDE SWALLOW EVALUATION  Oral Assessment:  Oral Assessment  Labial: No impairment  Dentition: Intact; Natural  Oral Hygiene: adequate  Lingual: No impairment  P.O. Trials:  Patient Position: upright in bed     The patient was given teaspoon to straw amounts of the following:   Consistency Presented: Mixed consistency; Solid; Thin liquid  How Presented: Self-fed/presented;Cup/sip;Spoon;Straw;Successive swallows     ORAL PHASE:  Bolus Acceptance: No impairment  Bolus Formation/Control: No impairment  Propulsion: No impairment     Oral Residue: None     PHARYNGEAL PHASE:  Initiation of Swallow: No impairment  Laryngeal Elevation: Functional  Aspiration Signs/Symptoms: Delayed cough/throat clear  Vocal Quality: No impairment                 OTHER OBSERVATIONS:  Rate/bite size: WNL         Endurance: WNL                Tool Used: Dysphagia Outcome and Severity Scale (MIHIR)     Score Comments   Normal Diet  [ ] 7 With no strategies or extra time needed   Functional Swallow  [ ] 6 May have mild oral or pharyngeal delay         Mild Dysphagia     [ ] 5 Which may require one diet consistency restricted (those who demonstrate penetration which is entirely cleared on MBS would be included)   Mild-Moderate Dysphagia  [ ] 4 With 1-2 diet consistencies restricted         Moderate Dysphagia  [ ] 3 With 2 or more diet consistencies restricted         Moderately Severe Dysphagia  [ ] 2 With partial PO strategies (trials with ST only)         Severe Dysphagia  [ ] 1 With inability to tolerate any PO safely            Score:  Initial: 6 Most Recent: X (Date: -- )   Interpretation of Tool: The Dysphagia Outcome and Severity Scale (MIHIR) is a simple, easy-to-use, 7-point scale developed to systematically rate the functional severity of dysphagia based on objective assessment and make recommendations for diet level, independence level, and type of nutrition.        Score 7 6 5 4 3 2 1   Modifier CH CI CJ CK CL CM CN   · Swallowing:               - CURRENT STATUS:           CI - 1%-19% impaired, limited or restricted  - GOAL STATUS:                   CH - 0% impaired, limited or restricted               - D/C STATUS:                       ---------------To be determined---------------  [place] / [place] / Product Type: Medicare /       TREATMENT:         (In addition to Assessment/Re-Assessment sessions the following treatments were rendered)  Assessment/Reassessment only, no treatment provided today  MODALITIES:                                                                     ORAL MOTOR  EXERCISES:                                                                                                                                                                       LARYNGEAL / PHARYNGEAL EXERCISES:                                                                                                                                      __________________________________________________________________________________________________  Safety:   After treatment position/precautions:  · Upright in Bed  Treatment Assessment:   . Progression/Medical Necessity:   · Skilled intervention continues to be required due to medical complications. Compliance with Program/Exercises: Will assess as treatment progresses. Reason for Continuation of Services/Other Comments:  · Patient continues to require skilled intervention due to dysphagia. Recommendations/Intent for next treatment session: \"Treatment next visit will focus on po trials\".      Total Treatment Duration:  Time In: 0845  Time Out: [address], MSP, CCC-SLP

## 2017-01-06 NOTE — PROGRESS NOTES
TRANSFER - IN REPORT:    Verbal report received from KATY Palacios (name) on [facility]  being received from ED (unit) for routine progression of care      Report consisted of patients Situation, Background, Assessment and   Recommendations(SBAR). Information from the following report(s) SBAR, Kardex, ED Summary, [facility] and Recent Results was reviewed with the receiving nurse. Opportunity for questions and clarification was provided. Assessment completed upon patients arrival to unit and care assumed. Skin assessment completed by Lisa Panda and [place], RN. Pt skin is CDI, pt has some redness to sacrum blanchable at this time. Pt is AOX4. Will continue to monitor.

## 2017-01-06 NOTE — DISCHARGE SUMMARY
Physician Discharge Summary     Patient: Fernando Petty MRN: [id number]  SSN: [id number]    YOB: 1945  Age: 66 y.o. Sex: female       Admit Date: 1/5/2017    Discharge Date: 1/6/2017    Admission Diagnoses: Aphasia  Aphasia    Discharge Diagnoses:   Problem List as of 1/6/2017  Date Reviewed: 11/29/2016          Codes Class Noted - Resolved    * (Principal)Aphasia ICD-10-CM: R47.01  ICD-9-CM: 784.3  1/5/2017 - Present        Gastroesophageal reflux disease with esophagitis ICD-10-CM: K21.0  ICD-9-CM: 530.11  7/22/2016 - Present        Peripheral neuropathy due to chemotherapy [facility]) ICD-10-CM: G62.0, T45.1X5A  ICD-9-CM: 357.7, E933.1  1/20/2016 - Present        IgG multiple myeloma ([facility] [address] ICD-10-CM: C90.00  ICD-9-CM: 203.00  1/20/2016 - Present    Overview Signed 1/20/2016 10:02 AM by Juwan Tejeda MD     kappa-prior stem cell transplant-M spike 0.2 12/2015             Gastroesophageal reflux disease without esophagitis ICD-10-CM: K21.9  ICD-9-CM: 530.81  1/20/2016 - Present        Anxiety disorder ICD-10-CM: F41.9  ICD-9-CM: 300.00  1/20/2016 - Present        HLD (hyperlipidemia) ICD-10-CM: E78.5  ICD-9-CM: 272.4  Unknown - Present        RESOLVED: Acute cholecystitis ICD-10-CM: K81.0  ICD-9-CM: 575.0  8/2/2016 - 11/3/2016        RESOLVED: Right upper quadrant abdominal pain ICD-10-CM: R10.11  ICD-9-CM: 789.01  8/1/2016 - 11/3/2016        RESOLVED: Cholecystitis, acute with cholelithiasis ICD-10-CM: K80.00  ICD-9-CM: 574.00  8/1/2016 - 11/3/2016        RESOLVED: TIA (transient ischemic attack) ICD-10-CM: G45.9  ICD-9-CM: 435.9  8/20/2015 - 8/22/2015               Discharge Condition: Stable    Hospital Course: Ms. Torey Sharp is a 67 yo WF with PMH of multiple myeloma followed per Dr. Elina Nielsen of [facility] admitted with word finding difficulty and CVA workup. CT head negative, MRI brain however shows multiple acute to early subacute lacunar CVA. Carotid duplex and ECHO per report below no acute issues.  She was already taking asa 325 mg daily and we discussed change to plavix 75 mg daily. She is in agreement and i did speak with her oncologist Dr. Ingrid Plummer and she is ok with this as well. Her pravachol is increased to 40 mg daily. She has mild deficits with ambulation, would prefer outpatient rehab. She will have cardiology referral for event monitor as telemetry did not show arrhythmia. She is stable for home. Primary Care Physician:  Ana Kingsley MD     Consults: none    Significant Diagnostic Studies:     CT Brain dated 1/5/2017      Comparison: None     Clinical Information: Slurred speech         5 mm axial images were obtained from skull base to vertex without contrast.     Radiation dose reduction techniques were used for this study. Our scanners use  one or all of the following: Automated exposure control, adjustment of the mA  and/or kV according to patient size, iterative reconstruction.        Findings:     , Sulci and cisterns are normal in size. No midline shift. Ill-defined  hypodensity is present throughout the cerebral white matter bilaterally  consistent chronic ischemic white matter change. No hemorrhage, mass, mass  effect or acute territorial infarction. No extra-axial abnormality. . No skull  fracture. Mastoid air cells and visualized paranasal sinuses are aerated and  unremarkable.     IMPRESSION  Impression:     No Acute Abnormality     MRI BRAIN WITHOUT CONTRAST 1/6/2017     HISTORY: Difficulty with speech. Multiple myeloma. Peripheral neuropathy.     TECHNIQUE: Sagittal and axial T1-weighted, axial T2-weighted, axial and coronal  FLAIR, axial T2-weighted gradient-echo, axial diffusion weighted images with ADC  maps of the brain.      COMPARISON: Head CT 1/5/2017     FINDINGS: There are multiple foci of restricted diffusion within the bilateral  frontal and parietal lobes and within the posterior left corona radiata.  A  similar finding is present in the splenium on the right side and within the left  cerebellar hemisphere. Findings suggest acute early subacute lacunar infarcts,  possibly from emboli.     On the T2-weighted and FLAIR sequences, there are extensive hyperintense white  matter lesions compatible with chronic small vessel ischemic disease. There are  old occipital infarcts. There is no hydrocephalus, intra-axial mass, or abnormal  extra-axial fluid collection. There are old lacunar infarcts in the periatrial  white matter.        IMPRESSION  IMPRESSION:     1. Multiple acute to early subacute lacunar infarcts throughout the brain as  described. These may be embolic in etiology from a proximal source. Neurological  follow-up is recommended.     2. Old occipital and periatrial lacunar infarcts.     3. White matter findings compatible with advanced chronic small vessel ischemic  disease.           TITLE: Carotid and Vertebral Ultrasound Examination.     INDICATION: Transient ischemic attack. MRI demonstrates multiple lacunar  infarcts.     TECHNIQUE: Grayscale, color, and Doppler interrogation performed. All velocity  measurements are made in relation to the distal internal carotid artery. The  degree of stenosis is inferred from velocity parameters and cross referenced to  published correlations. Velocity criteria are extrapolated from diameter data  as defined in the [address] [place] [address] Road symptomatic carotid endarterectomy trial  (NASCET).      COMPARISON: Ultrasound 9/8/2015.     RIGHT NECK: The peak systolic velocity in the Common Carotid Artery = 61  cm/sec; Internal Carotid Artery = 114 cm/sec. Ratio = 1.9.      Atherosclerotic wall thickening/irregularity throughout the CCA. Moderate  waveform broadening. Anterograde flow in the vertebral artery.     LEFT NECK: The peak systolic velocity in the Common Carotid Artery = 73  cm/sec; Internal Carotid Artery = 93 cm/sec. Ratio = 1.3.      Mild atherosclerotic mural irregularity throughout the CCA. Tortuous distal  LICA.  Moderate waveform broadening. Anterograde flow in the vertebral artery.     IMPRESSION  IMPRESSION:   KAMRON: No hemodynamically significant stenosis.     LICA: No hemodynamically significant stenosis.     Minimal change since . There is evidence of mild atherosclerotic disease in  both carotid arteries. [address] [facility], [address] [facility]  [phone]    Transthoracic Echocardiogram  2D, M-mode, Doppler, and Color Doppler    Patient: Galdino Stearns  MR #: [id number]  : 1945  Age: 66 years  Gender: Female  Study date: 2017  Account #: [de-identified]  Height: 61 in  Weight: 124.7 lb  BSA: 1.55 mï¾²  Status:Routine  Location: [facility]  BP: 123/ 68    Allergies: NO KNOWN ALLERGIES    Sonographer: Sofia Nguyen [facility]  Group:  [facility]  Referring Physician: Chelsea Valentine DO  Reading Physician: Leopold Records. Carolina Morales MD [facility]    INDICATIONS: Ischemic stroke    PROCEDURE: This was a routine study. A transthoracic echocardiogram was  performed. The study included complete 2D imaging, M-mode, complete spectral  Doppler, and color Doppler. Intravenous contrast (agitated saline) was  administered to evaluate possible R-L intracardiac shunting. Intravenous  contrast (Definity, 1 ml) was administered to opacify the left ventricle. Image  quality was adequate. LEFT VENTRICLE: Size was normal. Systolic function was normal. Ejection  fraction was estimated in the range of 60 % to 65 %. There were no regional  wall motion abnormalities. Wall thickness was normal.    VENTRICULAR SEPTUM: Thickness was mildly increased. There was sigmoid septal  appearance. RIGHT VENTRICLE: The size was normal. Systolic function was normal. The  tricuspid jet envelope definition was inadequate for estimation of RV   systolic  pressure.  There are no indirect findings (abnormal RV volume or geometry,  altered pulmonary flow velocity profile, or leftward septal displacement) which  would suggest moderate or severe pulmonary hypertension. LEFT ATRIUM: The atrium was mildly dilated. ATRIAL SEPTUM: Contrast injection was performed. There was no right-to-left  shunt, with provocative maneuvers to increase right atrial pressure. RIGHT ATRIUM: Size was normal.    SYSTEMIC VEINS: IVC: The inferior vena cava was not well visualized. AORTIC VALVE: The valve was trileaflet. Leaflets exhibited mild sclerosis. There was no evidence for stenosis. There was trivial regurgitation. MITRAL VALVE: Valve structure was normal. There was mild regurgitation. TRICUSPID VALVE: The valve structure was normal. There was no evidence for  stenosis. There was trivial regurgitation. PULMONIC VALVE: Not well visualized. There was no evidence for stenosis. There  was trivial regurgitation. PERICARDIUM: There was no pericardial effusion. AORTA: The root exhibited normal size. SUMMARY:    -  Left ventricle: Systolic function was normal. Ejection fraction was  estimated in the range of 60 % to 65 %. There were no regional wall motion  abnormalities. -  Left atrium: The atrium was mildly dilated. -  Atrial septum: Contrast injection was performed. There was no   right-to-left  shunt, with provocative maneuvers to increase right atrial pressure. -  Mitral valve: There was mild regurgitation. SYSTEM MEASUREMENT TABLES    2D mode  AoR Diam (2D): 2.6 cm  LA Dimension (2D): 2.9 cm  IVS/LVPW (2D): 1.3  IVSd (2D): 1.3 cm  LVIDd (2D): 3.4 cm  LVIDs (2D): 2.3 cm  LVOT Area (2D): 2.8 cm2  LVPWd (2D): 1 cm    Unspecified Scan Mode  Peak Grad; Mean; Antegrade Flow: 6 mm[Hg]  Vmax; Antegrade Flow: 124 cm/s  LVOT Diam: 1.9 cm    Prepared and signed by    Sumanth Frias MD [facility]  Signed 06-Jan-2017 16:18:06                           Discharge Exam:  Visit Vitals    /64 (BP 1 Location: Right arm, BP Patient Position: Sitting)    Pulse 70    Temp 98 °F (36.7 °C)    Resp 19    Ht 5' 1\" (1.549 m)    Wt 56.7 kg (125 lb)    SpO2 100%    Breastfeeding No    BMI 23.62 kg/m2     General appearance: alert, cooperative, no distress, appears stated age  Lungs: clear to auscultation bilaterally, good effort  Heart: regular rate and rhythm, S1, S2 normal, no murmur, click, rub or gallop, no edema  Abdomen: soft, non-tender. Bowel sounds normal. No masses,  no organomegaly  Skin: Skin color, texture, turgor normal. No rashes or lesions    Disposition: Home    Discharge Medications:   Current Discharge Medication List      START taking these medications    Details   clopidogrel (PLAVIX) 75 mg tab Take 1 Tab by mouth daily. Qty: 90 Tab, Refills: 0         CONTINUE these medications which have CHANGED    Details   pravastatin (PRAVACHOL) 20 mg tablet Take 2 Tabs by mouth nightly. Qty: 90 Tab, Refills: 0         CONTINUE these medications which have NOT CHANGED    Details   clorazepate (TRANXENE) 3.75 mg tablet Take 3.75 mg by mouth two (2) times a day. pyridoxine, vitamin B6, (VITAMIN B-6) 100 mg tablet Take 100 mg by mouth two (2) times a day. OTHER Take 4 Caps by mouth daily. Dry eye omega      B.infantis-B.ani-B.long-B.bifi (PROBIOTIC 4X) 10-15 mg TbEC Take 1 Tab by mouth daily. colestipol (COLESTID) 1 gram tablet Take 1 Tab by mouth two (2) times a day. Qty: 60 Tab, Refills: 6    Associated Diagnoses: Diarrhea following gastrointestinal surgery      mirtazapine (REMERON) 30 mg tablet Take 1 Tab by mouth nightly. Qty: 30 Tab, Refills: 6    Associated Diagnoses: Reactive depression      potassium chloride (K-DUR, KLOR-CON) 10 mEq tablet Take 1 Tab by mouth three (3) times daily. Qty: 90 Tab, Refills: 1    Associated Diagnoses: Hypokalemia, gastrointestinal losses      esomeprazole (NEXIUM) 40 mg capsule Take 1 Cap by mouth daily.   Qty: 30 Cap, Refills: 12    Associated Diagnoses: Gastroesophageal reflux disease with esophagitis      LYRICA 100 mg capsule 100 mg two (2) times a day.    Associated Diagnoses: Peripheral neuropathy, secondary to drugs or chemicals      cycloSPORINE (RESTASIS) 0.05 % ophthalmic emulsion Administer 1 Drop to both eyes two (2) times a day. dexamethasone (DECADRON) 4 mg tablet Take 4 mg by mouth every Monday. REVLIMID 15 mg cap 1 Cap every twenty-one (21) days. Indications: MULTIPLE MYELOMA      aspirin (ASPIRIN) 325 mg tablet Take 325 mg by mouth daily. cyanocobalamin (VITAMIN B-12) 1,000 mcg tablet Take 1,000 mcg by mouth daily. multivitamins-minerals-lutein (CENTRUM SILVER) Tab Take 1 Tab by mouth daily. STOP taking these medications       calcium-cholecalciferol, d3, (CALCIUM 600 + D) 600-125 mg-unit tab Comments:   Reason for Stopping:         ANTIOX#10/OM3/DHA/EPA/LUT/ZEAX (I-CAPS PO) Comments:   Reason for Stopping:         CALCIUM CITRATE/VITAMIN D3 (CALCIUM CITRATE + D PO) Comments:   Reason for Stopping:               Activity: Activity as tolerated    Diet: Cardiac Diet    Wound Care: None needed    Follow-up Appointments   Procedures    FOLLOW UP VISIT Appointment in: One Week 1 week PCP, needs contact info for [facility] cardiology for referral for event monitor ,     1 week PCP, needs contact info for [facility] cardiology for referral for event monitor ,     Standing Status:   Standing     Number of Occurrences:   1     Order Specific Question:   Appointment in     Answer:    One Week        Time to discharge: 30 minutes    Signed By: Lennox Aye, MD     January 6, 2017

## 2017-01-06 NOTE — PROGRESS NOTES
Problem: Interdisciplinary Rounds  Goal: Interdisciplinary Rounds  Outcome: Progressing Towards Goal  Interdisciplinary team rounds were held 1/6/2017 with the following team members:Care Management, Pastoral Care, Physical Therapy, Physician and . Plan of care discussed. See clinical pathway and/or care plan for interventions and desired outcomes.

## 2017-01-06 NOTE — PROGRESS NOTES
Physical Therapy Note:    Orders received, chart reviewed. Patient currently off the floor for MRI. Will check back as patient is available and schedule allows.     Thank you,  Rosas Pena DPT

## 2017-01-06 NOTE — PROGRESS NOTES
Problem: Mobility Impaired (Adult and Pediatric)  Goal: *Acute Goals and Plan of Care (Insert Text)  Discharge Goals:    (1.)Ms. Thomas Walters will move from supine to sit and sit to supine , scoot up and down and roll side to side with MODIFIED INDEPENDENCE within 5 day(s). (2.)Ms. Thomas Walters will transfer from bed to chair and chair to bed with MODIFIED INDEPENDENCE using the least restrictive device within 5 day(s). (3.)Ms. Thomas Walters will ambulate with MODIFIED INDEPENDENCE for 500+ feet with the least restrictive device within 5 day(s). (4.)Ms. Thomas Walters will perform standing static and dynamic balance activities x 15 minutes with CONTACT GUARD ASSIST to improve safety within 5 day(s). (5.)Ms. Thomas Walters will perform LE exercises with 1 to 2 cues for form within 5 days to improve strength for functional transfers and ambulation. ________________________________________________________________________________________________  PHYSICAL THERAPY: INITIAL ASSESSMENT, AM 1/19/2017  OBSERVATION: Hospital Day: 2  [place] / Product Type: Medicare /      NAME/AGE/GENDER: Viviana Jaramillo is a 74 y.o. female         PRIMARY DIAGNOSIS: Aphasia  Aphasia Aphasia Aphasia        ICD-10: Treatment Diagnosis: Difficulty in walking, Not elsewhere classified (R26.2)  Precautions/Allergies:   Fall Review of patient's allergies indicates no known allergies. Assessment:      Ms. Thomas Walters presents supine in bed, agreeable to therapy. She presented with aphasia to ER and reports independence with ambulation, however family reports she has been having impaired balance for the past 5-6 weeks with increased falls. MRI indicates: \"There are multiple foci of restricted diffusion within the bilateral frontal and parietal lobes and within the posterior left corona radiata. A similar finding is present in the splenium on the right side and within the left cerebellar hemisphere.  Findings suggest acute early subacute lacunar infarcts, possibly from emboli. 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 She required supervision to transfer to sitting, exhibits good sitting balance and required CGA to stand and ambulate with hand held assistance in hallway. Able to find objects and rooms in the hallways, however exhibits high guard stance during ambulation as well as increased trunk sway and path deviation. Unsure if there may be some visual deficits on right side, as patient had to turn her head to see these. With balance testing, patient unable to  tandem stance or even lift one foot to attempt single limb stance. Discussed with family and they are in agreement that patient would benefit from walker use in the home as well as continued balance training, as she exhibits at an increased risk for falling at this time. Odessa Elizabeth is currently functioning below her baseline and would benefit from skilled PT during acute care stay to maximize safety and independence with functional mobility. Ms. Zoraida Combs was discharged from our facility before further treatment could be provided in this setting. This section established at most recent assessment   PROBLEM LIST (Impairments causing functional limitations):  1. Decreased ADL/Functional Activities  2. Decreased Transfer Abilities  3. Decreased Ambulation Ability/Technique  4. Decreased Balance  5. Decreased Knowledge of Precautions  6. Decreased [place] with Home Exercise Program    INTERVENTIONS PLANNED: (Benefits and precautions of physical therapy have been discussed with the patient.)  1. Balance Exercise  2. Bed Mobility  3. Family Education  4. Gait Training  5. Home Exercise Program (HEP)  6. Therapeutic Activites  7. Therapeutic Exercise/Strengthening  8. Transfer Training  9. Patient Education  10.  Group Therapy      TREATMENT PLAN: Frequency/Duration: 3 times a week for duration of hospital stay  Rehabilitation Potential For Stated Goals: EXCELLENT      RECOMMENDED REHABILITATION/EQUIPMENT: (at time of discharge pending progress): Continue Skilled Therapy and Outpatient: Physical Therapy. HISTORY:   History of Present Injury/Illness (Reason for Referral):  Per MD Note: Elodia Allen is a 66 y.o.  female who presents with periods of difficulty getting her words out. She does not lose her train of thought but has trouble forming the words. No swallowing difficulty. No other deficits. CT head was negative. The started upon waking and did not improve throughout the day. \"  Past Medical History/Comorbidities:   Ms. Carolina Cloud  has a past medical history of Anemia; Anxiety; Back ache; Benign paroxysmal positional vertigo; Cancer ([facility] [address] Contact dermatitis and other eczema, due to unspecified cause; Disease of esophagus; DVT (deep venous thrombosis) ([facility] [address] (); Dyslipidemia; Edema; Esophageal reflux; FHx: malignant neoplasm of gastrointestinal tract; GERD (gastroesophageal reflux disease); Herpes zoster; History of autologous stem cell transplant ([facility] [address] (10/2013); HLD (hyperlipidemia); Hypercholesterolemia; Multiple myeloma ([address] (); Neuropathy; Peripheral neuropathy ([facility] [address] Primary hypercoagulable state ([address] Rosacea; Rosacea; Shingles; Shingles (); and Stroke [facility]). She also has no past medical history of Chronic kidney disease. Ms. Carolina Cloud  has a past surgical history that includes  section; tubal ligation; colonoscopy (); other surgical (3/06); endoscopy (3/08); heent (2014); and cholecystectomy (2016).   Social History/Living Environment:   Home Environment: Private residence  # Steps to Enter: 0  One/Two Story Residence: Two story, live on 1st floor  Living Alone: No  Support Systems: Child(richard), Spouse/Significant Other/Partner  Patient Expects to be Discharged to[de-identified] Private residence  Current DME Used/Available at Home: Cane, straight  Prior Level of Function/Work/Activity:  Reports independence with all mobility, although 2 falls recently      Number of Personal Factors/Comorbidities that affect the Plan of Care:  · Recent falls  · Peripheral neuropathy  · History of BPPV 3+: HIGH COMPLEXITY   EXAMINATION:   Most Recent Physical Functioning:   Gross Assessment:  AROM: Within functional limits  Strength: Within functional limits  Coordination: Generally decreased, functional  Tone: Normal  Sensation: Impaired (chemo induced neuropathy)               Posture:  Posture (WDL): Exceptions to WDL  Posture Assessment: Forward head  Balance:    Bed Mobility:     Wheelchair Mobility:     Transfers:     Gait:             Body Structures Involved:  1. Nerves  2. Voice/Speech  3. Muscles Body Functions Affected:  1. Mental  2. Voice and Speech  3. Neuromusculoskeletal  4. Movement Related Activities and Participation Affected:  1. Mobility  2. Self Care  3. Interpersonal Interactions and Relationships  4. Community, Social and Cassia Dixon   Number of elements that affect the Plan of Care: 4+: HIGH COMPLEXITY   CLINICAL PRESENTATION:   Presentation: Stable and uncomplicated: LOW COMPLEXITY   CLINICAL DECISION MAKIN [facility] Mobility Inpatient Short Form  How much difficulty does the patient currently have. .. Unable A Lot A Little None   1. Turning over in bed (including adjusting bedclothes, sheets and blankets)? [ ] 1   [ ] 2   [ ] 3   [X] 4   2. Sitting down on and standing up from a chair with arms ( e.g., wheelchair, bedside commode, etc.)   [ ] 1   [ ] 2   [X] 3   [ ] 4   3. Moving from lying on back to sitting on the side of the bed? [ ] 1   [ ] 2   [ ] 3   [X] 4               How much help from another person does the patient currently need. .. Total A Lot A Little None   4. Moving to and from a bed to a chair (including a wheelchair)? [ ] 1   [ ] 2   [X] 3   [ ] 4   5. Need to walk in hospital room? [ ] 1   [ ] 2   [X] 3   [ ] 4   6.   Climbing 3-5 steps with a railing? [ ] 1   [ ] 2   [X] 3   [ ] 4   © 2007, Trustees of [address] [place] [address] [address] [id number], under license to [employer]. All rights reserved       Score:  Initial: 20 Most Recent: X (Date: -- )   Interpretation of Tool:  Represents activities that are increasingly more difficult (i.e. Bed mobility, Transfers, Gait). Score 24 23 22-20 19-15 14-10 9-7 6       Modifier CH CI CJ CK CL CM CN         · Mobility - Walking and Moving Around:               - CURRENT STATUS:    CJ - 20%-39% impaired, limited or restricted               - GOAL STATUS:           CI - 1%-19% impaired, limited or restricted               - D/C STATUS:                       CJ - 20%-39% impaired, limited or restricted  [place] / [place] / Product Type: Medicare /       Medical Necessity:   · Patient demonstrates excellent rehab potential due to higher previous functional level. Reason for Services/Other Comments:  · Patient continues to require modification of therapeutic interventions to increase complexity of exercises. Use of outcome tool(s) and clinical judgement create a POC that gives a: Clear prediction of patient's progress: LOW COMPLEXITY   TREATMENT:   (In addition to Assessment/Re-Assessment sessions the following treatments were rendered)  Assessment/Reassessment only, no treatment provided today     Treatment/Session Assessment:  Patient with no complaints throughout session. · Pre-treatment Symptoms:  none  · Pain: Initial:   0/10 Post Session:  0/10   · Interdisciplinary Collaboration:  · Physical Therapist  · Registered Nurse  ·   · After treatment position/precautions:  · Supine in bed  · Bed/Chair-wheels locked  · Bed in low position  · Call light within reach  · RN notified  · Family at bedside   · Compliance with Program/Exercises: Will assess as treatment progresses. · Recommendations/Intent for next treatment session:   \"Next visit will focus on advancements to more challenging activities and reduction in assistance provided\".   Total Treatment Duration:  PT Patient Time In/Time Out  Time In: 1024  Time Out: [address], DPT

## 2017-01-06 NOTE — ED PROVIDER NOTES
HPI Comments: Patient is a 78-year-old female who is coming in with some difficulty speaking started this morning at breakfast.  She states it seems better now that she still has occasional trouble with word expressing it. She has no trouble blinking of the right. She had no weakness in the left or the right side. She denies any history of CVA  states he thought a few times in the past she may have had TIAs. Patient denies any pain. Patient is a 66 y.o. female presenting with aphasia. The history is provided by the patient. Aphasia    Pertinent negatives include no diarrhea, no vomiting, no shortness of breath and no stridor.         Past Medical History:   Diagnosis Date    Anemia     Anxiety     Back ache     Benign paroxysmal positional vertigo     Cancer ([address]      multiple myeloma    Contact dermatitis and other eczema, due to unspecified cause     Disease of esophagus      esophagitis    DVT (deep venous thrombosis) ([address]      from thalidomide    Dyslipidemia     Edema      symptoms involving skin and other integumentary tissue, edema    Esophageal reflux     FHx: malignant neoplasm of gastrointestinal tract     GERD (gastroesophageal reflux disease)     Herpes zoster      without mention of complication    History of autologous stem cell transplant ([address] 10/2013     for myeloma-IgG kappa    HLD (hyperlipidemia)     Hypercholesterolemia     Multiple myeloma ([address]      first abnormality dx -in remission    Neuropathy     Peripheral neuropathy ([address]     Primary hypercoagulable state ([address]     Rosacea     Rosacea     Shingles     Shingles 2008    Stroke [facility])        Past Surgical History:   Procedure Laterality Date    Hx  section       x2    Hx tubal ligation      Hx colonoscopy       ok EGD-done sldo    Hx other surgical  3/06     port for venous access    Hx endoscopy  3/08     upper/lower endo-for heme pos stool/hiatal hernia and tics    Hx heent  4/2014     thyroid cyst [id number]-[id number]-Sy. Ellene Manas    Hx cholecystectomy  08/02/2016         Family History:   Problem Relation Age of Onset    Stroke Mother     Heart Failure Mother     Other Mother      TIA's    Cancer Father      colon       Social History     Social History    Marital status:      Spouse name: N/A    Number of children: N/A    Years of education: N/A     Occupational History    Not on file. Social History Main Topics    Smoking status: Never Smoker    Smokeless tobacco: Never Used    Alcohol use No    Drug use: No    Sexual activity: Not on file     Other Topics Concern    Not on file     Social History Narrative         ALLERGIES: Review of patient's allergies indicates no known allergies. Review of Systems   Constitutional: Negative for chills and fever. Respiratory: Negative for chest tightness, shortness of breath, wheezing and stridor. Cardiovascular: Negative for chest pain and palpitations. Gastrointestinal: Negative for abdominal pain, diarrhea, nausea and vomiting. Skin: Negative. All other systems reviewed and are negative. Vitals:    01/05/17 1742   BP: 120/62   Pulse: 75   Resp: 18   Temp: 98.2 °F (36.8 °C)   SpO2: 99%   Weight: 56.7 kg (125 lb)   Height: 5' 1\" (1.549 m)            Physical Exam   Constitutional: She is oriented to person, place, and time. She appears well-developed and well-nourished. No distress. HENT:   Head: Normocephalic and atraumatic. Eyes: Conjunctivae are normal. No scleral icterus. Neck: Normal range of motion. Neck supple. Cardiovascular: Normal rate, regular rhythm and normal heart sounds. Pulmonary/Chest: Effort normal and breath sounds normal. No stridor. No respiratory distress. She has no wheezes. She has no rales. She exhibits no tenderness. Abdominal: Soft. She exhibits no distension. There is no tenderness. There is no rebound and no guarding.    Neurological: She is alert and oriented to person, place, and time. No cranial nerve deficit. Coordination normal.   No focal weakness Occasional difficulty getting a word out but most of her speech is easily understood. Patient has no drift in any extremities. Skin: Skin is warm and dry. No rash noted. She is not diaphoretic. No erythema. Psychiatric: She has a normal mood and affect. Her behavior is normal.   Nursing note and vitals reviewed. MDM  Number of Diagnoses or Management Options  Diagnosis management comments: Patient still has occasional trouble with her speech no other deficits noted head CT shows no acute findings but there is some chronic ischemic white matter changes. Plan to evaluate him and admit for likely small CVA. Jeremías Mcclellan MD; 1/5/2017 @8:27 PM Voice dictation software was used during the making of this note. This software is not perfect and grammatical and other typographical errors may be present. This note has not been proofread for errors.  ===================================================================        Amount and/or Complexity of Data Reviewed  Tests in the radiology section of CPT®: ordered and reviewed (Ct Head Wo Cont    Result Date: 1/5/2017  CT Brain dated 1/5/2017  Comparison: None Clinical Information:  Slurred speech  5 mm axial images were obtained from skull base to vertex without contrast. Radiation dose reduction techniques were used for this study. Our scanners use one or all of the following:  Automated exposure control, adjustment of the mA and/or kV according to patient size, iterative reconstruction. Findings: , Sulci and cisterns are normal in size. No midline shift. Ill-defined hypodensity is present throughout the cerebral white matter bilaterally consistent chronic ischemic white matter change. No hemorrhage, mass, mass effect or acute territorial infarction. No extra-axial abnormality. .  No skull fracture.   Mastoid air cells and visualized paranasal sinuses are aerated and unremarkable.      Impression: No Acute Abnormality    )      ED Course       Procedures

## 2017-01-06 NOTE — PROGRESS NOTES
Discharge instructions, follow up appointment and prescriptions reviewed and explained to the patient and family. A copy of discharge instructions and prescriptions  have been given to patient. Opportunity for questions provided.  Patient instructed to stop Aspirin per Dr John Morales

## 2017-01-06 NOTE — H&P
History and Physical    Patient seen and examined before midnight. Subjective:     Nicole Stokes is a 66 y.o.  female who presents with periods of difficulty getting her words out. She does not lose her train of thought but has trouble forming the words. No swallowing difficulty. No other deficits. CT head was negative. The started upon waking and did not improve throughout the day.      Past Medical History   Diagnosis Date    Anemia     Anxiety     Back ache     Benign paroxysmal positional vertigo     Cancer (HCC)      multiple myeloma    Contact dermatitis and other eczema, due to unspecified cause     Disease of esophagus      esophagitis    DVT (deep venous thrombosis) ([address]      from thalidomide    Dyslipidemia     Edema      symptoms involving skin and other integumentary tissue, edema    Esophageal reflux     FHx: malignant neoplasm of gastrointestinal tract     GERD (gastroesophageal reflux disease)     Herpes zoster      without mention of complication    History of autologous stem cell transplant ([address] 10/2013     for myeloma-IgG kappa    HLD (hyperlipidemia)     Hypercholesterolemia     Multiple myeloma ([address]      first abnormality dx -in remission    Neuropathy     [facility]     Primary hypercoagulable state ([facility]     Rosacea     Rosacea     Shingles     Shingles 2008    Stroke [facility])       Past Surgical History   Procedure Laterality Date    Hx  section       x2    Hx tubal ligation      Hx colonoscopy       ok EGD-done sldo    Hx other surgical  3/06     port for venous access    Hx endoscopy  3/08     upper/lower endo-for heme pos stool/hiatal hernia and tics    Hx heent  2014     thyroid cyst [id number]. Cleatrice Searing    Hx cholecystectomy  2016     Family History   Problem Relation Age of Onset    Stroke Mother     Heart Failure Mother     Other Mother      Ivy Payal Cancer Father colon      Social History   Substance Use Topics    Smoking status: Never Smoker    Smokeless tobacco: Never Used    Alcohol use No       Prior to Admission medications    Medication Sig Start Date End Date Taking? Authorizing Provider   pravastatin (PRAVACHOL) 20 mg tablet TAKE 1 TABLET BY MOUTH AT BEDTIME. 12/19/16   Olivia Arellano MD   colestipol (COLESTID) 1 gram tablet Take 1 Tab by mouth two (2) times a day. 11/29/16   Olivia Arellano MD   mirtazapine (REMERON) 30 mg tablet Take 1 Tab by mouth nightly. 11/29/16   Olivia Arellano MD   potassium chloride (K-DUR, KLOR-CON) 10 mEq tablet Take 1 Tab by mouth three (3) times daily. 11/29/16   Olivia Arellano MD   esomeprazole (NEXIUM) 40 mg capsule Take 1 Cap by mouth daily. 7/22/16   Olivia Arellano MD   clorazepate (TRANXENE) 3.75 mg tablet Take 1 Tab by mouth two (2) times a day. Max Daily Amount: 7.5 mg. Patient taking differently: Take 3.75 mg by mouth nightly. 7/22/16   Olivia Arellano MD   LYRICA 100 mg capsule 100 mg two (2) times a day. 12/30/15   Historical Provider   cycloSPORINE (RESTASIS) 0.05 % ophthalmic emulsion Administer 1 Drop to both eyes two (2) times a day. Historical Provider   ANTIOX#10/OM3/DHA/EPA/LUT/ZEAX (I-CAPS PO) Take  by mouth. Historical Provider   CALCIUM CITRATE/VITAMIN D3 (CALCIUM CITRATE + D PO) Take  by mouth. Historical Provider   dexamethasone (DECADRON) 4 mg tablet Take 4 mg by mouth every seven (7) days. 6/19/15   Historical Provider   REVLIMID 15 mg cap 1 Cap every twenty-one (21) days. Indications: MULTIPLE MYELOMA 8/19/15   Historical Provider   aspirin (ASPIRIN) 325 mg tablet Take 325 mg by mouth daily. Historical Provider   pyridoxine (VITAMIN B-6) 50 mg tablet Take 50 mg by mouth two (2) times a day. William Maldonado MD   cyanocobalamin (VITAMIN B-12) 1,000 mcg tablet Take 1,000 mcg by mouth daily. William Maldonado MD   multivitamins-minerals-lutein (CENTRUM SILVER) Tab Take 1 Tab by mouth daily.     William Other, MD     No Known Allergies     Review of Systems:  A comprehensive review of systems was negative except for that written in the History of Present Illness. Objective: Intake and Output:            Physical Exam:   Visit Vitals    /67    Pulse 72    Temp 98.2 °F (36.8 °C)    Resp 18    Ht 5' 1\" (1.549 m)    Wt 56.7 kg (125 lb)    SpO2 99%    BMI 23.62 kg/m2     General appearance: alert, cooperative, no distress, appears stated age  Head: Normocephalic, without obvious abnormality, atraumatic  Back: symmetric, no curvature. ROM normal. No CVA tenderness. Lungs: clear to auscultation bilaterally  Heart: regular rate and rhythm, S1, S2 normal, no murmur, click, rub or gallop  Abdomen: soft, non-tender. Bowel sounds normal. No masses,  no organomegaly  Neurologic: Grossly normal      Data Review:   Recent Results (from the past 24 hour(s))   CBC WITH AUTOMATED DIFF    Collection Time: 01/05/17  8:50 PM   Result Value Ref Range    WBC 3.1 (L) 4.3 - 11.1 K/uL    RBC 3.44 (L) 4.05 - 5.25 M/uL    HGB 12.2 11.7 - 15.4 g/dL    HCT 38.4 35.8 - 46.3 %    .6 (H) 79.6 - 97.8 FL    MCH 35.5 (H) 26.1 - 32.9 PG    MCHC 31.8 31.4 - 35.0 g/dL    RDW 14.5 11.9 - 14.6 %    PLATELET 295 (L) 803 - 450 K/uL    MPV 11.8 10.8 - 14.1 FL    DF AUTOMATED      NEUTROPHILS 53 43 - 78 %    LYMPHOCYTES 34 13 - 44 %    MONOCYTES 7 4.0 - 12.0 %    EOSINOPHILS 6 0.5 - 7.8 %    BASOPHILS 0 0.0 - 2.0 %    IMMATURE GRANULOCYTES 0.3 0.0 - 5.0 %    ABS. NEUTROPHILS 1.6 (L) 1.7 - 8.2 K/UL    ABS. LYMPHOCYTES 1.1 0.5 - 4.6 K/UL    ABS. MONOCYTES 0.2 0.1 - 1.3 K/UL    ABS. EOSINOPHILS 0.2 0.0 - 0.8 K/UL    ABS. BASOPHILS 0.0 0.0 - 0.2 K/UL    ABS. IMM.  GRANS. 0.0 0.0 - 0.5 K/UL   METABOLIC PANEL, COMPREHENSIVE    Collection Time: 01/05/17  8:50 PM   Result Value Ref Range    Sodium 146 (H) 136 - 145 mmol/L    Potassium 4.2 3.5 - 5.1 mmol/L    Chloride 111 (H) 98 - 107 mmol/L    CO2 28 21 - 32 mmol/L    Anion gap 7 7 - 16 mmol/L    Glucose 79 65 - 100 mg/dL    BUN 29 (H) 8 - 23 MG/DL    Creatinine 0.84 0.6 - 1.0 MG/DL    GFR est AA >60 >60 ml/min/1.73m2    GFR est non-AA >60 >60 ml/min/1.73m2    Calcium 8.7 8.3 - 10.4 MG/DL    Bilirubin, total 0.4 0.2 - 1.1 MG/DL    ALT 24 12 - 65 U/L    AST 13 (L) 15 - 37 U/L    Alk. phosphatase 65 50 - 136 U/L    Protein, total 6.2 (L) 6.3 - 8.2 g/dL    Albumin 2.9 (L) 3.2 - 4.6 g/dL    Globulin 3.3 2.3 - 3.5 g/dL    A-G Ratio 0.9 (L) 1.2 - 3.5     PROTHROMBIN TIME + INR    Collection Time: 01/05/17  8:50 PM   Result Value Ref Range    Prothrombin time 10.2 9.6 - 12.0 sec    INR 0.9 0.9 - 1.2     EKG, 12 LEAD, INITIAL    Collection Time: 01/05/17  9:37 PM   Result Value Ref Range    Systolic BP  mmHg    Diastolic BP  mmHg    Ventricular Rate 68 BPM    Atrial Rate 68 BPM    P-R Interval 182 ms    QRS Duration 86 ms    Q-T Interval 392 ms    QTC Calculation (Bezet) 416 ms    Calculated P Axis 55 degrees    Calculated R Axis -6 degrees    Calculated T Axis 9 degrees    Diagnosis       !! AGE AND GENDER SPECIFIC ECG ANALYSIS !! Normal sinus rhythm  Voltage criteria for left ventricular hypertrophy  Inferior infarct , age undetermined  Abnormal ECG             Assessment:     Principal Problem:    Aphasia (1/5/2017)    Active Problems:    Peripheral neuropathy due to chemotherapy ([facility] (1/20/2016)      IgG multiple myeloma ([facility] (1/20/2016)      Overview: kappa-prior stem cell transplant-M spike 0.2 12/2015        Plan:     Admit to remote tele on OBS  IVF  MRI, Echo, Carotids in AM  Therapy consults  FULL Code  DC 1-2 days. If true CVA then change to inpatient, however, likely would not require 3 day stay.      Signed By: Aminata Carver DO     January 6, 2017

## 2017-01-06 NOTE — DISCHARGE INSTRUCTIONS
Activity: Activity as tolerated     Diet: Cardiac Diet      Stroke: After Your Visit     Your Care Instructions     You have had a stroke. Risk factors for stroke include being overweight, smoking, and sedentary lifestyle. This means that the blood flow to a part of your brain was blocked for some time, which damages the nerve cells in that part of the brain. The part of your body controlled by that part of your brain may not function properly now. The brain is an amazing organ that can heal itself to some degree. The stroke you had damaged part of your brain, but other parts of your brain may take over in some way for the damaged areas. You have already started this process. Going home may be hard for you and your family. The more you can try to do for yourself, the better. Remember to take each day one at a time. Follow-up care is a key part of your treatment and safety. Be sure to make and go to all appointments, and call your doctor if you are having problems. Its also a good idea to know your test results and keep a list of the medicines you take. How can you care for yourself at home? Enter a stroke rehabilitation (rehab) program, if your doctor recommends it. Physical, speech, and occupational therapies can help you manage bathing, dressing, eating, and other basics of daily living. Eat a heart-healthy diet that is low in cholesterol, saturated fat, and salt. Eat lots of fresh fruits and vegetables and foods high in fiber. Increase your activities slowly. Take short rest breaks when you get tired. Gradually increase the amount you walk. Start out by walking a little more than you did the day before. Do not drive until your doctor says it is okay. It is normal to feel sad or depressed after a stroke. If the blues last, talk to your doctor. If you are having problems with urine leakage, go to the bathroom at regular times, including when you first wake up and at bedtime.  Also, limit fluids after dinner. If you are constipated, drink plenty of fluids, enough so that your urine is light yellow or clear like water. If you have kidney, heart, or liver disease and have to limit fluids, talk with your doctor before you increase the amount of fluids you drink. Set up a regular time for using the toilet. If you continue to have constipation, your doctor may suggest using a bulking agent, such as Metamucil, or a stool softener, laxative, or enema. Medicines  Take your medicines exactly as prescribed. Call your doctor if you think you are having a problem with your medicine. You may be taking several medicines. ACE (angiotensin-converting enzyme) inhibitors, angiotensin II receptor blockers (ARBs), beta-blockers, diuretics (water pills), and calcium channel blockers control your blood pressure. Statins help lower cholesterol. Your doctor may also prescribe medicines for depression, pain, sleep problems, anxiety, or agitation. If your doctor has given you medicine that prevents blood clots, such as warfarin (Coumadin), aspirin combined with extended-release dipyridamole (Aggrenox), clopidogrel (Plavix), or aspirin to prevent another stroke, you should:  Tell your dentist, pharmacist, and other health professionals that you take these medicines. Watch for unusual bruising or bleeding, such as blood in your urine, red or black stools, or bleeding from your nose or gums. Get regular blood tests to check your clotting time if you are taking Coumadin. Wear medical alert jewelry that says you take blood thinners. You can buy this at most drugstores. Do not take any over-the-counter medicines or herbal products without talking to your doctor first.  If you take birth control pills or hormone replacement therapy, talk to your doctor about whether they are right for you. For family members and caregivers  Make the home safe. Set up a room so that your loved one does not have to climb stairs.  Be sure the bathroom is on the same floor. Move throw rugs and furniture that could cause falls, and make sure that the lighting is good. Put grab bars and seats in tubs and showers. Find out what your loved one can do and what he or she needs help with. Try not to do things for your loved one that your loved one can do on his or her own. Help him or her learn and practice new skills. Visit and talk with your loved one often. Try doing activities together that you both enjoy, such as playing cards or board games. Keep in touch with your loved one's friends as much as you can, and encourage them to visit. Take care of yourself. Do not try to do everything yourself. Ask other family members to help. Eat well, get enough rest, and take time to do things that you enjoy. Keep up with your own doctor visits, and make sure to take your medicines regularly. Get out of the house as much as you can. Join a local support group. Find out if you qualify for home health care visits to help with rehab or for adult day care. When should you call for help? Call 911 anytime you think you may need emergency care. For example, call if:  You have signs of another stroke. These may include:  Sudden numbness, paralysis, or weakness in your face, arm, or leg, especially on only one side of your body. New problems with walking or balance. Sudden vision changes. Drooling or slurred speech. New problems speaking or understanding simple statements, or you feel confused. A sudden, severe headache that is different from past headaches. Call 911 even if these symptoms go away in a few minutes. You cough up blood. You vomit blood or what looks like coffee grounds. You pass maroon or very bloody stools. Call your doctor now or seek immediate medical care if:  You have new bruises or blood spots under your skin. You have a nosebleed. Your gums bleed when you brush your teeth. You have blood in your urine.   Your stools are black and tarlike or have streaks of blood. You have vaginal bleeding when you are not having your period, or heavy period bleeding. You have new symptoms that may be related to your stroke, such as falls or trouble swallowing. Watch closely for changes in your health, and be sure to contact your doctor if you have any problems. Where can you learn more? Go to [employer]    Enter C294  in the search box to learn more about \"Stroke: After Your Visit\". © 5426-9625 [employer], [employer]. Care instructions adapted under license by [employer] (which disclaims liability or warranty for this information). This care instruction is for use with your licensed healthcare professional. If you have questions about a medical condition or this instruction, always ask your healthcare professional. Tylor Johnson any warranty or liability for your use of this information. DISCHARGE SUMMARY from Nurse    The following personal items are in your possession at time of discharge:    Dental Appliances: None  Visual Aid: Glasses, At home     Home Medications: None  Jewelry: Ring, With patient  Clothing: None  Other Valuables: None  Personal Items Sent to Safe: none          PATIENT INSTRUCTIONS:    After general anesthesia or intravenous sedation, for 24 hours or while taking prescription Narcotics:  · Limit your activities  · Do not drive and operate hazardous machinery  · Do not make important personal or business decisions  · Do  not drink alcoholic beverages  · If you have not urinated within 8 hours after discharge, please contact your surgeon on call.     Report the following to your surgeon:  · Excessive pain, swelling, redness or odor of or around the surgical area  · Temperature over 100.5  · Nausea and vomiting lasting longer than 4 hours or if unable to take medications  · Any signs of decreased circulation or nerve impairment to extremity: change in color, persistent  numbness, tingling, coldness or increase pain  · Any questions        What to do at Home:  Recommended activity: Activity as tolerated,     If you experience any of the following symptoms see discharge instructions, please follow up with surgeon. *  Please give a list of your current medications to your Primary Care Provider. *  Please update this list whenever your medications are discontinued, doses are      changed, or new medications (including over-the-counter products) are added. *  Please carry medication information at all times in case of emergency situations. These are general instructions for a healthy lifestyle:    No smoking/ No tobacco products/ Avoid exposure to second hand smoke    Surgeon General's Warning:  Quitting smoking now greatly reduces serious risk to your health. Obesity, smoking, and sedentary lifestyle greatly increases your risk for illness    A healthy diet, regular physical exercise & weight monitoring are important for maintaining a healthy lifestyle    You may be retaining fluid if you have a history of heart failure or if you experience any of the following symptoms:  Weight gain of 3 pounds or more overnight or 5 pounds in a week, increased swelling in our hands or feet or shortness of breath while lying flat in bed. Please call your doctor as soon as you notice any of these symptoms; do not wait until your next office visit. Recognize signs and symptoms of STROKE:    F-face looks uneven    A-arms unable to move or move unevenly    S-speech slurred or non-existent    T-time-call 911 as soon as signs and symptoms begin-DO NOT go       Back to bed or wait to see if you get better-TIME IS BRAIN. Warning Signs of HEART ATTACK     Call 911 if you have these symptoms:   Chest discomfort. Most heart attacks involve discomfort in the center of the chest that lasts more than a few minutes, or that goes away and comes back.  It can feel like uncomfortable pressure, squeezing, fullness, or pain.  Discomfort in other areas of the upper body. Symptoms can include pain or discomfort in one or both arms, the back, neck, jaw, or stomach.  Shortness of breath with or without chest discomfort.  Other signs may include breaking out in a cold sweat, nausea, or lightheadedness. Don't wait more than five minutes to call 911 - MINUTES MATTER! Fast action can save your life. Calling 911 is almost always the fastest way to get lifesaving treatment. Emergency Medical Services staff can begin treatment when they arrive -- up to an hour sooner than if someone gets to the hospital by car. The discharge information has been reviewed with the patient. The patient verbalized understanding. Discharge medications reviewed with the patient and appropriate educational materials and side effects teaching were provided.

## 2017-01-06 NOTE — PROGRESS NOTES
Problem: Self Care Deficits Care Plan (Adult)  Goal: *Acute Goals and Plan of Care (Insert Text)  1. Arpita Leyva will complete Mays test in 4 minutes or less with 100% accuracy indicating improved visual scanning techniques as needed for activities of daily living and functional mobility for activities of daily living. [address] will complete functional mobility for activities of daily living with modified independence with head turns as needed to avoid environmental obstacles. [address] will complete total body bathing and dressing with modified independence. [address] will complete homemaking tasks with modified independence. Time frame: 4 - 7 visits  OCCUPATIONAL THERAPY: INITIAL ASSESSMENT, TREATMENT DAY: 1ST 1/6/2017  OBSERVATION: Hospital Day: 2  [place] / [place] / Product Type: Medicare /      NAME/AGE/GENDER: Arpita Leyva is a 66 y.o. female         PRIMARY DIAGNOSIS: Aphasia  Aphasia Aphasia Aphasia        ICD-10: Treatment Diagnosis: Other lack of cordination (R27.8)  Precautions/Allergies:    Review of patient's allergies indicates no known allergies. Assessment:      Ms. Kendall Arevalo is s/p CVA (\"Multiple acute to early subacute lacunar infarcts throughout the brain per MRI\") and presents with decreased dynamic standing balance, R visual field deficits, and decreased eye-hand coordination (additional time to tie shoes). She states she has had difficulty with her vision over the last 3 months. She reports blurred vision with reading in the past (not today). Her oculomotor strength appears intact. Pupils equal and round. She scored 17/17 in L/R hemispheres of the May's test, but took 7-8 minutes to do it (typically takes 2 minutes or less) with poor scanning strategy. She also had difficulty reading signs in the hallway on the R hand side. She could repeat a simple sentence and answer basic abstract reasoning questions.   She will need trained on head turns and visual scanning strategies. Also, recommended she follow up with her eye doctor potentially for a visual fields test to better determined extent of deficits. Cami Carbone presents with the following problems and would benefit from occupational therapy to maximize independence with self-care,instrumental activities of daily living, and functional transfers/mobility for activities of daily living/instrumental activities of daily living via the stated goals. Ms. Brisa Meyer was discharged from our facility before further treatment could be provided in this setting. This section established at most recent assessment   PROBLEM LIST (Impairments causing functional limitations):  1. Decreased Strength  2. Decreased ADL/Functional Activities  3. Decreased Balance  4. Decreased Cognition  5. Decreased vision-perception INTERVENTIONS PLANNED: (Benefits and precautions of occupational therapy have been discussed with the patient.)  1. Activities of daily living training  2. Cognitive training  3. Neuromuscular re-eduation  4. Theraputic activity  5. Theraputic exercise      TREATMENT PLAN: Frequency/Duration: 3 times a week for duration of hospital stay  Rehabilitation Potential For Stated Goals: GOOD      RECOMMENDED REHABILITATION/EQUIPMENT: (at time of discharge pending progress): Decreased Strength  Decreased ADL/Functional Activities  Decreased Balance  Decreased Cognition  Decreased visual-perception. OCCUPATIONAL PROFILE & HISTORY:   History of Present Injury/Illness (Reason for Referral):   Per H & P: Cami Carbone is a 66 y.o.  female who presents with periods of difficulty getting her words out. She does not lose her train of thought but has trouble forming the words. No swallowing difficulty. No other deficits. CT head was negative. The started upon waking and did not improve throughout the day. MRI Brain (1/6/2017):  IMPRESSION:     1. Multiple acute to early subacute lacunar infarcts throughout the brain as  described. These may be embolic in etiology from a proximal source. Neurological  follow-up is recommended. 2. Old occipital and periatrial lacunar infarcts. 3. White matter findings compatible with advanced chronic small vessel ischemic  disease. Past Medical History/Comorbidities:   Ms. Mauricio Emerson  has a past medical history of Anemia; Anxiety; Back ache; Benign paroxysmal positional vertigo; Cancer ([facility] Contact dermatitis and other eczema, due to unspecified cause; Disease of esophagus; DVT (deep venous thrombosis) ([facility] (); Dyslipidemia; Edema; Esophageal reflux; FHx: malignant neoplasm of gastrointestinal tract; GERD (gastroesophageal reflux disease); Herpes zoster; History of autologous stem cell transplant ([facility] (10/2013); HLD (hyperlipidemia); Hypercholesterolemia; Multiple myeloma ([facility] (); Neuropathy; Peripheral neuropathy ([facility] Primary hypercoagulable state ([facility] Rosacea; Rosacea; Shingles; Shingles (); and Stroke [facility]). She also has no past medical history of Chronic kidney disease. Ms. Mauricio Emerson  has a past surgical history that includes  section; tubal ligation; colonoscopy (); other surgical (3/06); endoscopy (3/08); heent (2014); and cholecystectomy (2016). Social History/Living Environment: Lives with . Home Environment: Private residence  # Steps to Enter: 0  One/Two Story Residence: Two story, live on 1st floor  Living Alone: No  Support Systems: Child(richard), Spouse/Significant Other/Partner  Patient Expects to be Discharged to[de-identified] Private residence  Current DME Used/Available at Home: Cane, straight  Prior Level of Function/Work/Activity:  Patient is typically independent with ADL. She is a retired  and has a  daughter. Drives a little. Able to get groceries at store.   Dominant Side:         RIGHT  Complexity Level: Expanded review of therapy/medical records (1-2):  MODERATE COMPLEXITY ASSESSMENT OF OCCUPATIONAL PERFORMANCE:   Activities of Daily Living:       Basic ADLs (From Assessment) Complex ADLs (From Assessment)   Basic ADL  Feeding: Stand-by assistance  Oral Facial Hygiene/Grooming: Stand-by assistance  Bathing: Contact guard assistance  Upper Body Dressing: Stand-by assistance  Lower Body Dressing: Contact guard assistance  Toileting: Stand by assistance Instrumental ADL  Meal Preparation: Maximum assistance  Homemaking: Maximum assistance   Grooming/Bathing/Dressing Activities of Daily Living     Cognitive Retraining  Safety/Judgement: Fall prevention                     Lower Body Dressing Assistance  Shoes with Cloth Laces: Supervision/set-up Bed/Mat Mobility  Rolling: Supervision  Supine to Sit: Supervision  Sit to Supine: Supervision  Sit to Stand: Supervision  Scooting: Independent  Cues: Verbal cues provided       Most Recent Physical Functioning:   Gross Assessment:  AROM: Within functional limits  Strength: Within functional limits  Sensation:  (history of BLE neuropathy)               Posture:  Posture (WDL): Exceptions to WDL  Posture Assessment: Forward head  Balance:  Sitting: Intact  Standing: Impaired  Standing - Static: Good  Standing - Dynamic : Fair Bed Mobility:  Rolling: Supervision  Supine to Sit: Supervision  Sit to Supine: Supervision  Scooting: Independent  Wheelchair Mobility:     Transfers:  Sit to Stand: Supervision  Stand to Sit: Supervision      Perception:      Decreased visual perception especially R visual field.   Bell's Test: R: ; L:  (7-8 minutes to complete)             Patient Vitals for the past 6 hrs:    BP BP Patient Position SpO2 Pulse   17 1134 152/60 At rest 100 % 71   17 1544 150/64 Sitting 100 % 70        Mental Status  Neurologic State: Alert  Orientation Level: Oriented to person, Oriented to place  Cognition: Follows commands (Abstract thinkin/2 simple q.s correct)  Perception: Cues to attend right visual field  Perseveration: No perseveration noted  Safety/Judgement: Fall prevention                        Physical Skills Involved:  1. Balance  2. Dexterity  3. Vision-perception Cognitive Skills Affected (resulting in the inability to perform in a timely and safe manner):  1. Perceiving  2. Problem Solving Psychosocial Skills Affected:  1. Environmental Adaptations   Number of elements that affect the Plan of Care: 3-5:  MODERATE COMPLEXITY   CLINICAL DECISION MAKIN[address] [place] [address] AM-PACTM \"6 Clicks\"  Daily Activity Inpatient Short Form  How much help from another person does the patient currently need. .. Total A Lot A Little None   1. Putting on and taking off regular lower body clothing?   [ ] 1   [ ] 2   [X] 3   [ ] 4   2. Bathing (including washing, rinsing, drying)? [ ] 1   [ ] 2   [X] 3   [ ] 4   3. Toileting, which includes using toilet, bedpan or urinal?   [ ] 1   [ ] 2   [X] 3   [ ] 4   4. Putting on and taking off regular upper body clothing?   [ ] 1   [ ] 2   [X] 3   [ ] 4   5. Taking care of personal grooming such as brushing teeth? [ ] 1   [ ] 2   [X] 3   [ ] 4   6. Eating meals? [ ] 1   [ ] 2   [X] 3   [ ] 4   © , Trustees of [address] [place] [address], under license to [employer]. All rights reserved       Score:  Initial: 18 Most Recent: X (Date: -- )   Interpretation of Tool:  Represents clinically-significant functional categories (i.e.Activities of daily living).     Score 24 23 22-20 19-14 13-10 9-7 6       Modifier CH CI CJ CK CL CM CN     · Self Care:               - CURRENT STATUS:    CK - 40%-59% impaired, limited or restricted               - GOAL STATUS:           CI - 1%-19% impaired, limited or restricted               - D/C STATUS:                       CK - 40%-59% impaired, limited or restricted  [place] / [place] / Product Type: Medicare /          Medical Necessity:   · Patient demonstrates good rehab potential due to higher previous functional level. Reason for Services/Other Comments:  · Patient continues to require skilled intervention due to decreased independence with ADL, instrumental ADL, and functional mobility for ADL. Clinical Decision-Making: MODERATE COMPLEXITY   TREATMENT:   (In addition to Assessment/Re-Assessment sessions the following treatments were rendered)  Therapeutic Activity: (    10 minutes): Therapeutic activities including Chair transfers, tieing shoe, and functional mobility for ADL with contact guard assistance including visual scanning techniques (R head turns) to identify environmental obstacles to improve mobility, strength, balance and coordination. Required moderate Safety awareness training;Manual cues; Verbal cues; Visual/Demos to promote static and dynamic balance in standing and promote coordination of bilateral, upper extremity(s). Treatment/Session Assessment:  Odessa Elizabeth required visual/verbal cueing to read signs in hallway. She also required contact guard assistance to to decreased dynamic standing balance. .  · Pre-treatment Symptoms:  No complaints. · Pain: Initial:   No complaints of pain Post Session:  No complaints of pain   · Interdisciplinary Collaboration:  · Occupational Therapist  · Registered Nurse  · Compliance with Program/Exercises: Will assess as treatment progresses. · Recommendations/Intent for next treatment session: \"Next visit will focus on advancements to more challenging activities\".   Total Treatment Duration:  OT Patient Time In/Time Out  Time In: 1521  Time Out: [address] CRISTY Sweeney, OTR/L

## 2017-01-06 NOTE — ROUTINE PROCESS
TRANSFER - OUT REPORT:    Verbal report given to josh(name) on Hiram Octave  being transferred to 7th  (unit) room 720 for routine progression of care       Report consisted of patients Situation, Background, Assessment and   Recommendations(SBAR). Information from the following report(s) ED Summary was reviewed with the receiving nurse. Opportunity for questions and clarification was provided.       Patient transported with:   Transporter

## 2017-01-07 NOTE — PROGRESS NOTES
JOSE JUAN dc screening. Adm with word finding difficulty and CVA workup. CT head negative, MRI brain showed multiple acute to early subacute lacunar CVA. Some mild ambulation deficits noted. Alert and oriented in all spheres. Resides with spouse. Supportive family. Will dc with a RW ordered through [facility]. Outpt PT ordered through [address] Code 40 letter given to pt. Signed copy placed in chart.

## 2017-01-10 PROBLEM — I63.9 MULTIPLE CEREBRAL INFARCTIONS (HCC): Status: ACTIVE | Noted: 2017-01-10

## 2017-01-25 ENCOUNTER — HOSPITAL ENCOUNTER (OUTPATIENT)
Dept: MRI IMAGING | Age: 72
Discharge: HOME | End: 2017-01-25
Attending: INTERNAL MEDICINE
Payer: MEDICARE

## 2017-01-25 DIAGNOSIS — I63.9 MULTIPLE CEREBRAL INFARCTIONS (HCC): ICD-10-CM

## 2017-01-25 PROCEDURE — 70551 MRI BRAIN STEM W/O DYE: CPT

## 2017-03-24 PROBLEM — E78.5 DYSLIPIDEMIA: Status: ACTIVE | Noted: 2017-03-24

## 2017-06-16 PROBLEM — F32.A DEPRESSION: Status: ACTIVE | Noted: 2017-04-27

## 2017-06-16 PROBLEM — R32 INCONTINENCE: Status: ACTIVE | Noted: 2017-06-11

## 2017-06-16 PROBLEM — I63.9 CEREBROVASCULAR ACCIDENT (CVA) (HCC): Status: ACTIVE | Noted: 2017-03-01

## 2017-07-08 ENCOUNTER — HOSPITAL ENCOUNTER (EMERGENCY)
Age: 72
Discharge: HOME | End: 2017-07-08
Attending: EMERGENCY MEDICINE
Payer: MEDICARE

## 2017-07-08 ENCOUNTER — APPOINTMENT (OUTPATIENT)
Dept: GENERAL RADIOLOGY | Age: 72
End: 2017-07-08
Attending: EMERGENCY MEDICINE
Payer: MEDICARE

## 2017-07-08 ENCOUNTER — APPOINTMENT (OUTPATIENT)
Dept: CT IMAGING | Age: 72
End: 2017-07-08
Attending: EMERGENCY MEDICINE
Payer: MEDICARE

## 2017-07-08 VITALS
TEMPERATURE: 98 F | SYSTOLIC BLOOD PRESSURE: 140 MMHG | OXYGEN SATURATION: 99 % | DIASTOLIC BLOOD PRESSURE: 61 MMHG | RESPIRATION RATE: 18 BRPM | HEIGHT: 62 IN | HEART RATE: 75 BPM | BODY MASS INDEX: 25.95 KG/M2 | WEIGHT: 141 LBS

## 2017-07-08 DIAGNOSIS — N39.0 URINARY TRACT INFECTION WITHOUT HEMATURIA, SITE UNSPECIFIED: ICD-10-CM

## 2017-07-08 DIAGNOSIS — R41.0 DISORIENTATION: Primary | ICD-10-CM

## 2017-07-08 LAB
ALBUMIN SERPL BCP-MCNC: 2.9 G/DL (ref 3.2–4.6)
ALBUMIN/GLOB SERPL: 0.9 {RATIO} (ref 1.2–3.5)
ALP SERPL-CCNC: 66 U/L (ref 50–136)
ALT SERPL-CCNC: 25 U/L (ref 12–65)
ANION GAP BLD CALC-SCNC: 10 MMOL/L (ref 7–16)
AST SERPL W P-5'-P-CCNC: 32 U/L (ref 15–37)
ATRIAL RATE: 82 BPM
BACTERIA URNS QL MICRO: ABNORMAL /HPF
BASOPHILS # BLD AUTO: 0 K/UL (ref 0–0.2)
BASOPHILS # BLD: 1 % (ref 0–2)
BILIRUB SERPL-MCNC: 0.6 MG/DL (ref 0.2–1.1)
BUN SERPL-MCNC: 23 MG/DL (ref 8–23)
CALCIUM SERPL-MCNC: 8.7 MG/DL (ref 8.3–10.4)
CALCULATED P AXIS, ECG09: 73 DEGREES
CALCULATED R AXIS, ECG10: -19 DEGREES
CALCULATED T AXIS, ECG11: 40 DEGREES
CASTS URNS QL MICRO: 0 /LPF
CHLORIDE SERPL-SCNC: 107 MMOL/L (ref 98–107)
CO2 SERPL-SCNC: 27 MMOL/L (ref 21–32)
CREAT SERPL-MCNC: 0.87 MG/DL (ref 0.6–1)
CRYSTALS URNS QL MICRO: 0 /LPF
DIAGNOSIS, 93000: NORMAL
DIFFERENTIAL METHOD BLD: ABNORMAL
EOSINOPHIL # BLD: 0.1 K/UL (ref 0–0.8)
EOSINOPHIL NFR BLD: 3 % (ref 0.5–7.8)
EPI CELLS #/AREA URNS HPF: ABNORMAL /HPF
ERYTHROCYTE [DISTWIDTH] IN BLOOD BY AUTOMATED COUNT: 14.4 % (ref 11.9–14.6)
GLOBULIN SER CALC-MCNC: 3.4 G/DL (ref 2.3–3.5)
GLUCOSE SERPL-MCNC: 122 MG/DL (ref 65–100)
HCT VFR BLD AUTO: 35.9 % (ref 35.8–46.3)
HGB BLD-MCNC: 12 G/DL (ref 11.7–15.4)
IMM GRANULOCYTES # BLD: 0 K/UL (ref 0–0.5)
IMM GRANULOCYTES NFR BLD AUTO: 0.2 % (ref 0–5)
LACTATE BLD-SCNC: 1.5 MMOL/L (ref 0.5–1.9)
LYMPHOCYTES # BLD AUTO: 23 % (ref 13–44)
LYMPHOCYTES # BLD: 1 K/UL (ref 0.5–4.6)
MCH RBC QN AUTO: 35.2 PG (ref 26.1–32.9)
MCHC RBC AUTO-ENTMCNC: 33.4 G/DL (ref 31.4–35)
MCV RBC AUTO: 105.3 FL (ref 79.6–97.8)
MONOCYTES # BLD: 0.4 K/UL (ref 0.1–1.3)
MONOCYTES NFR BLD AUTO: 9 % (ref 4–12)
MUCOUS THREADS URNS QL MICRO: 0 /LPF
NEUTS SEG # BLD: 2.8 K/UL (ref 1.7–8.2)
NEUTS SEG NFR BLD AUTO: 64 % (ref 43–78)
P-R INTERVAL, ECG05: 162 MS
PLATELET # BLD AUTO: 90 K/UL (ref 150–450)
PMV BLD AUTO: 12.1 FL (ref 10.8–14.1)
POTASSIUM SERPL-SCNC: 3.9 MMOL/L (ref 3.5–5.1)
PROT SERPL-MCNC: 6.3 G/DL (ref 6.3–8.2)
Q-T INTERVAL, ECG07: 372 MS
QRS DURATION, ECG06: 88 MS
QTC CALCULATION (BEZET), ECG08: 434 MS
RBC # BLD AUTO: 3.41 M/UL (ref 4.05–5.25)
RBC #/AREA URNS HPF: ABNORMAL /HPF
SODIUM SERPL-SCNC: 144 MMOL/L (ref 136–145)
TROPONIN I SERPL-MCNC: 0.03 NG/ML (ref 0.02–0.05)
VENTRICULAR RATE, ECG03: 82 BPM
WBC # BLD AUTO: 4.4 K/UL (ref 4.3–11.1)
WBC URNS QL MICRO: ABNORMAL /HPF

## 2017-07-08 PROCEDURE — 93005 ELECTROCARDIOGRAM TRACING: CPT | Performed by: EMERGENCY MEDICINE

## 2017-07-08 PROCEDURE — 83605 ASSAY OF LACTIC ACID: CPT

## 2017-07-08 PROCEDURE — 84484 ASSAY OF TROPONIN QUANT: CPT | Performed by: EMERGENCY MEDICINE

## 2017-07-08 PROCEDURE — 87186 SC STD MICRODIL/AGAR DIL: CPT | Performed by: EMERGENCY MEDICINE

## 2017-07-08 PROCEDURE — 70450 CT HEAD/BRAIN W/O DYE: CPT

## 2017-07-08 PROCEDURE — 87086 URINE CULTURE/COLONY COUNT: CPT | Performed by: EMERGENCY MEDICINE

## 2017-07-08 PROCEDURE — 99285 EMERGENCY DEPT VISIT HI MDM: CPT | Performed by: EMERGENCY MEDICINE

## 2017-07-08 PROCEDURE — 80053 COMPREHEN METABOLIC PANEL: CPT | Performed by: EMERGENCY MEDICINE

## 2017-07-08 PROCEDURE — 81003 URINALYSIS AUTO W/O SCOPE: CPT | Performed by: EMERGENCY MEDICINE

## 2017-07-08 PROCEDURE — 87088 URINE BACTERIA CULTURE: CPT | Performed by: EMERGENCY MEDICINE

## 2017-07-08 PROCEDURE — 71020 XR CHEST PA LAT: CPT

## 2017-07-08 PROCEDURE — 85025 COMPLETE CBC W/AUTO DIFF WBC: CPT | Performed by: EMERGENCY MEDICINE

## 2017-07-08 PROCEDURE — 81015 MICROSCOPIC EXAM OF URINE: CPT | Performed by: EMERGENCY MEDICINE

## 2017-07-08 RX ORDER — CIPROFLOXACIN 500 MG/1
500 TABLET ORAL 2 TIMES DAILY
Qty: 20 TAB | Refills: 0 | Status: SHIPPED | OUTPATIENT
Start: 2017-07-08 | End: 2017-07-31 | Stop reason: ALTCHOICE

## 2017-07-08 NOTE — DISCHARGE INSTRUCTIONS
Rest.  Plenty of fluids. Take antibiotic until completed. Recheck with her doctor 2 weeks a repeat urine test.  Recheck sooner for fever vomiting or worse symptoms. Results Include:    Recent Results (from the past 24 hour(s))   CBC WITH AUTOMATED DIFF    Collection Time: 07/08/17  1:41 PM   Result Value Ref Range    WBC 4.4 4.3 - 11.1 K/uL    RBC 3.41 (L) 4.05 - 5.25 M/uL    HGB 12.0 11.7 - 15.4 g/dL    HCT 35.9 35.8 - 46.3 %    .3 (H) 79.6 - 97.8 FL    MCH 35.2 (H) 26.1 - 32.9 PG    MCHC 33.4 31.4 - 35.0 g/dL    RDW 14.4 11.9 - 14.6 %    PLATELET 90 (L) 464 - 450 K/uL    MPV 12.1 10.8 - 14.1 FL    DF AUTOMATED      NEUTROPHILS 64 43 - 78 %    LYMPHOCYTES 23 13 - 44 %    MONOCYTES 9 4.0 - 12.0 %    EOSINOPHILS 3 0.5 - 7.8 %    BASOPHILS 1 0.0 - 2.0 %    IMMATURE GRANULOCYTES 0.2 0.0 - 5.0 %    ABS. NEUTROPHILS 2.8 1.7 - 8.2 K/UL    ABS. LYMPHOCYTES 1.0 0.5 - 4.6 K/UL    ABS. MONOCYTES 0.4 0.1 - 1.3 K/UL    ABS. EOSINOPHILS 0.1 0.0 - 0.8 K/UL    ABS. BASOPHILS 0.0 0.0 - 0.2 K/UL    ABS. IMM. GRANS. 0.0 0.0 - 0.5 K/UL   METABOLIC PANEL, COMPREHENSIVE    Collection Time: 07/08/17  1:41 PM   Result Value Ref Range    Sodium 144 136 - 145 mmol/L    Potassium 3.9 3.5 - 5.1 mmol/L    Chloride 107 98 - 107 mmol/L    CO2 27 21 - 32 mmol/L    Anion gap 10 7 - 16 mmol/L    Glucose 122 (H) 65 - 100 mg/dL    BUN 23 8 - 23 MG/DL    Creatinine 0.87 0.6 - 1.0 MG/DL    GFR est AA >60 >60 ml/min/1.73m2    GFR est non-AA >60 >60 ml/min/1.73m2    Calcium 8.7 8.3 - 10.4 MG/DL    Bilirubin, total 0.6 0.2 - 1.1 MG/DL    ALT (SGPT) 25 12 - 65 U/L    AST (SGOT) 32 15 - 37 U/L    Alk.  phosphatase 66 50 - 136 U/L    Protein, total 6.3 6.3 - 8.2 g/dL    Albumin 2.9 (L) 3.2 - 4.6 g/dL    Globulin 3.4 2.3 - 3.5 g/dL    A-G Ratio 0.9 (L) 1.2 - 3.5     TROPONIN I    Collection Time: 07/08/17  1:41 PM   Result Value Ref Range    Troponin-I, Qt. 0.03 0.02 - 0.05 NG/ML   POC LACTIC ACID    Collection Time: 07/08/17  1:46 PM   Result Value Ref Range    Lactic Acid (POC) 1.5 0.5 - 1.9 mmol/L   EKG, 12 LEAD, INITIAL    Collection Time: 07/08/17  2:05 PM   Result Value Ref Range    Ventricular Rate 82 BPM    Atrial Rate 82 BPM    P-R Interval 162 ms    QRS Duration 88 ms    Q-T Interval 372 ms    QTC Calculation (Bezet) 434 ms    Calculated P Axis 73 degrees    Calculated R Axis -19 degrees    Calculated T Axis 40 degrees    Diagnosis       !! AGE AND GENDER SPECIFIC ECG ANALYSIS !! Normal sinus rhythm  Minimal voltage criteria for LVH, may be normal variant  Possible Anterior infarct , age undetermined  Abnormal ECG  When compared with ECG of 05-JAN-2017 21:37,  Borderline criteria for Anterior infarct are now Present  Criteria for Inferior infarct are no longer Present  Nonspecific T wave abnormality now evident in Lateral leads     URINE MICROSCOPIC    Collection Time: 07/08/17  3:57 PM   Result Value Ref Range    WBC 20-50 0 /hpf    RBC 0-3 0 /hpf    Epithelial cells 0-3 0 /hpf    Bacteria 4+ (H) 0 /hpf    Casts 0 0 /lpf    Crystals, urine 0 0 /LPF    Mucus 0 0 /lpf     Xr Chest Pa Lat    Result Date: 7/8/2017  Chest two view Exam date: 7/8/2017  Comparison: 1/3/2012 Clinical Information: Chest pain Findings: Two views of the chest show the heart to be normal in size. The mediastinum is unremarkable. Pulmonary vascularity normal. Lungs clear. No pleural effusion. There is an increase in the kyphotic curvature of thoracic spine. Degenerative changes are present. Impression: No acute abnormality     Ct Head Wo Cont    Result Date: 7/8/2017  CT HEAD WITHOUT CONTRAST. INDICATION: Weakness. COMPARISON: 5 January 2017  TECHNIQUE:   5 mm axial scans from the skull base to the vertex. Our CT scanners use one or more of the following:  Automated exposure control, adjustment of the mA and or kV according to patient size, iterative reconstruction. FINDINGS:  No acute intraparenchymal hemorrhage or abnormal extra-axial fluid collection.   The ventricles are normal size. Extensive white matter low attenuation is present, nonspecific, likely chronic small vessel disease. No mass effect. Included portion of the paranasal sinuses and orbits grossly unremarkable. IMPRESSION:  Negative for acute intracranial abnormality. Chronic changes. Urinary Tract Infection in Women: Care Instructions  Your Care Instructions    A urinary tract infection, or UTI, is a general term for an infection anywhere between the kidneys and the urethra (where urine comes out). Most UTIs are bladder infections. They often cause pain or burning when you urinate. UTIs are caused by bacteria and can be cured with antibiotics. Be sure to complete your treatment so that the infection goes away. Follow-up care is a key part of your treatment and safety. Be sure to make and go to all appointments, and call your doctor if you are having problems. It's also a good idea to know your test results and keep a list of the medicines you take. How can you care for yourself at home? · Take your antibiotics as directed. Do not stop taking them just because you feel better. You need to take the full course of antibiotics. · Drink extra water and other fluids for the next day or two. This may help wash out the bacteria that are causing the infection. (If you have kidney, heart, or liver disease and have to limit fluids, talk with your doctor before you increase your fluid intake.)  · Avoid drinks that are carbonated or have caffeine. They can irritate the bladder. · Urinate often. Try to empty your bladder each time. · To relieve pain, take a hot bath or lay a heating pad set on low over your lower belly or genital area. Never go to sleep with a heating pad in place. To prevent UTIs  · Drink plenty of water each day. This helps you urinate often, which clears bacteria from your system.  (If you have kidney, heart, or liver disease and have to limit fluids, talk with your doctor before you increase your fluid intake.)  · Urinate when you need to. · Urinate right after you have sex. · Change sanitary pads often. · Avoid douches, bubble baths, feminine hygiene sprays, and other feminine hygiene products that have deodorants. · After going to the bathroom, wipe from front to back. When should you call for help? Call your doctor now or seek immediate medical care if:  · Symptoms such as fever, chills, nausea, or vomiting get worse or appear for the first time. · You have new pain in your back just below your rib cage. This is called flank pain. · There is new blood or pus in your urine. · You have any problems with your antibiotic medicine. Watch closely for changes in your health, and be sure to contact your doctor if:  · You are not getting better after taking an antibiotic for 2 days. · Your symptoms go away but then come back. Where can you learn more? Go to http://ameena-jay.info/. Enter A227 in the search box to learn more about \"Urinary Tract Infection in Women: Care Instructions. \"  Current as of: November 28, 2016  Content Version: 11.3  © 8353-7089 [employer]. Care instructions adapted under license by [employer] (which disclaims liability or warranty for this information). If you have questions about a medical condition or this instruction, always ask your healthcare professional. [address] any warranty or liability for your use of this information.

## 2017-07-08 NOTE — ED PROVIDER NOTES
HPI Comments: 75-year-old female has a history of multiple myeloma, numerous strokes, anemia, reflux. Noticed by her  to be increasingly weak all over this morning along with some updrafts and fecal incontinence. Also decreased mental status. No fever no cough. No vomiting or diarrhea. No abnormal bleeding. No focal numbness or weakness. Patient is a 74 y.o. female presenting with fatigue. The history is provided by the patient. Fatigue   This is a new problem. The current episode started 6 to 12 hours ago. The problem has not changed since onset. There was no focality noted. Primary symptoms include mental status change and disorientation. Pertinent negatives include no focal weakness, no loss of sensation, no slurred speech, no agitation, no visual change and no unresponsiveness. Associated symptoms include altered mental status, confusion and bowel incontinence. Pertinent negatives include no shortness of breath, no chest pain, no vomiting, no headaches, no nausea and no bladder incontinence.         Past Medical History:   Diagnosis Date    Anemia     Anxiety     Back ache     Benign paroxysmal positional vertigo     Cancer (HCC)     multiple myeloma    Contact dermatitis and other eczema, due to unspecified cause     Disease of esophagus     esophagitis    DVT (deep venous thrombosis) ([address] 2002    from thalidomide    Dyslipidemia     Edema     symptoms involving skin and other integumentary tissue, edema    Esophageal reflux     FHx: malignant neoplasm of gastrointestinal tract     GERD (gastroesophageal reflux disease)     Herpes zoster     without mention of complication    History of autologous stem cell transplant ([address] 10/2013    for myeloma-IgG kappa    HLD (hyperlipidemia)     Hypercholesterolemia     Multiple myeloma ([address] 1990    first abnormality dx 2000-in remission    Neuropathy     Peripheral neuropathy ([address]     Primary hypercoagulable state ([address]     Rosacea  Rosacea     Shingles     Shingles 2008    Stroke [facility])        Past Surgical History:   Procedure Laterality Date    HX  SECTION      x2    HX CHOLECYSTECTOMY  2016    HX COLONOSCOPY      ok EGD-done sldo    HX ENDOSCOPY  3/08    upper/lower endo-for heme pos stool/hiatal hernia and tics    HX HEENT  2014    thyroid cyst [id number]-Ueodtyqxuvsvfkawv-Ps. Humberto Craw    HX OTHER SURGICAL  3/06    port for venous access    HX TUBAL LIGATION           Family History:   Problem Relation Age of Onset    Stroke Mother     Heart Failure Mother     Other Mother      TIA's    Cancer Father      colon       Social History     Social History    Marital status:      Spouse name: N/A    Number of children: N/A    Years of education: N/A     Occupational History    Not on file. Social History Main Topics    Smoking status: Never Smoker    Smokeless tobacco: Never Used    Alcohol use No    Drug use: No    Sexual activity: Not on file     Other Topics Concern    Not on file     Social History Narrative         ALLERGIES: Review of patient's allergies indicates no known allergies. Review of Systems   Constitutional: Positive for fatigue. Negative for chills and fever. Respiratory: Negative for cough and shortness of breath. Cardiovascular: Negative for chest pain and palpitations. Gastrointestinal: Positive for bowel incontinence. Negative for abdominal pain, diarrhea, nausea and vomiting. Genitourinary: Negative for bladder incontinence, dysuria and flank pain. Musculoskeletal: Negative for back pain and neck pain. Skin: Negative for color change and rash. Neurological: Negative for focal weakness, syncope and headaches. Psychiatric/Behavioral: Positive for confusion. Negative for agitation. All other systems reviewed and are negative.       Vitals:    17 1323   BP: 127/64   Pulse: 85   Resp: 16   Temp: 98.3 °F (36.8 °C)   SpO2: 97%   Weight: 64 kg (141 lb)   Height: 5' 2\" (1.575 m)            Physical Exam   Constitutional: She appears well-developed and well-nourished. No distress. HENT:   Head: Normocephalic and atraumatic. Mouth/Throat: Oropharynx is clear and moist. No oropharyngeal exudate. Eyes: Conjunctivae and EOM are normal. Pupils are equal, round, and reactive to light. Neck: Normal range of motion. Neck supple. Cardiovascular: Normal rate, regular rhythm and intact distal pulses. No murmur heard. Pulmonary/Chest: Breath sounds normal. No respiratory distress. Abdominal: Soft. Bowel sounds are normal. She exhibits no mass. There is no tenderness. There is no rebound and no guarding. No hernia. Neurological: She is alert. She has normal strength. She is disoriented. Coordination normal. GCS eye subscore is 4. GCS verbal subscore is 4. GCS motor subscore is 6. Nl speech   Skin: Skin is warm and dry. Psychiatric: She has a normal mood and affect. Her speech is normal.   Nursing note and vitals reviewed. MDM  Number of Diagnoses or Management Options  Diagnosis management comments: Head CT. Cystogram bloodwork for anemia, infection, electrolyte abnormality, dehydration. Check chest x-ray and urinalysis. Also check on cardiac status with EKG.        Amount and/or Complexity of Data Reviewed  Clinical lab tests: ordered and reviewed  Tests in the radiology section of CPT®: ordered and reviewed  Tests in the medicine section of CPT®: ordered and reviewed      ED Course       Procedures      Results Include:    Recent Results (from the past 24 hour(s))   CBC WITH AUTOMATED DIFF    Collection Time: 07/08/17  1:41 PM   Result Value Ref Range    WBC 4.4 4.3 - 11.1 K/uL    RBC 3.41 (L) 4.05 - 5.25 M/uL    HGB 12.0 11.7 - 15.4 g/dL    HCT 35.9 35.8 - 46.3 %    .3 (H) 79.6 - 97.8 FL    MCH 35.2 (H) 26.1 - 32.9 PG    MCHC 33.4 31.4 - 35.0 g/dL    RDW 14.4 11.9 - 14.6 %    PLATELET 90 (L) 327 - 450 K/uL    MPV 12.1 10.8 - 14.1 FL DF AUTOMATED      NEUTROPHILS 64 43 - 78 %    LYMPHOCYTES 23 13 - 44 %    MONOCYTES 9 4.0 - 12.0 %    EOSINOPHILS 3 0.5 - 7.8 %    BASOPHILS 1 0.0 - 2.0 %    IMMATURE GRANULOCYTES 0.2 0.0 - 5.0 %    ABS. NEUTROPHILS 2.8 1.7 - 8.2 K/UL    ABS. LYMPHOCYTES 1.0 0.5 - 4.6 K/UL    ABS. MONOCYTES 0.4 0.1 - 1.3 K/UL    ABS. EOSINOPHILS 0.1 0.0 - 0.8 K/UL    ABS. BASOPHILS 0.0 0.0 - 0.2 K/UL    ABS. IMM. GRANS. 0.0 0.0 - 0.5 K/UL   METABOLIC PANEL, COMPREHENSIVE    Collection Time: 07/08/17  1:41 PM   Result Value Ref Range    Sodium 144 136 - 145 mmol/L    Potassium 3.9 3.5 - 5.1 mmol/L    Chloride 107 98 - 107 mmol/L    CO2 27 21 - 32 mmol/L    Anion gap 10 7 - 16 mmol/L    Glucose 122 (H) 65 - 100 mg/dL    BUN 23 8 - 23 MG/DL    Creatinine 0.87 0.6 - 1.0 MG/DL    GFR est AA >60 >60 ml/min/1.73m2    GFR est non-AA >60 >60 ml/min/1.73m2    Calcium 8.7 8.3 - 10.4 MG/DL    Bilirubin, total 0.6 0.2 - 1.1 MG/DL    ALT (SGPT) 25 12 - 65 U/L    AST (SGOT) 32 15 - 37 U/L    Alk. phosphatase 66 50 - 136 U/L    Protein, total 6.3 6.3 - 8.2 g/dL    Albumin 2.9 (L) 3.2 - 4.6 g/dL    Globulin 3.4 2.3 - 3.5 g/dL    A-G Ratio 0.9 (L) 1.2 - 3.5     TROPONIN I    Collection Time: 07/08/17  1:41 PM   Result Value Ref Range    Troponin-I, Qt. 0.03 0.02 - 0.05 NG/ML   POC LACTIC ACID    Collection Time: 07/08/17  1:46 PM   Result Value Ref Range    Lactic Acid (POC) 1.5 0.5 - 1.9 mmol/L   EKG, 12 LEAD, INITIAL    Collection Time: 07/08/17  2:05 PM   Result Value Ref Range    Ventricular Rate 82 BPM    Atrial Rate 82 BPM    P-R Interval 162 ms    QRS Duration 88 ms    Q-T Interval 372 ms    QTC Calculation (Bezet) 434 ms    Calculated P Axis 73 degrees    Calculated R Axis -19 degrees    Calculated T Axis 40 degrees    Diagnosis       !! AGE AND GENDER SPECIFIC ECG ANALYSIS !!   Normal sinus rhythm  Minimal voltage criteria for LVH, may be normal variant  Possible Anterior infarct , age undetermined  Abnormal ECG  When compared with ECG of 05-JAN-2017 21:37,  Borderline criteria for Anterior infarct are now Present  Criteria for Inferior infarct are no longer Present  Nonspecific T wave abnormality now evident in Lateral leads     URINE MICROSCOPIC    Collection Time: 07/08/17  3:57 PM   Result Value Ref Range    WBC 20-50 0 /hpf    RBC 0-3 0 /hpf    Epithelial cells 0-3 0 /hpf    Bacteria 4+ (H) 0 /hpf    Casts 0 0 /lpf    Crystals, urine 0 0 /LPF    Mucus 0 0 /lpf     Xr Chest Pa Lat    Result Date: 7/8/2017  Chest two view Exam date: 7/8/2017  Comparison: 1/3/2012 Clinical Information: Chest pain Findings: Two views of the chest show the heart to be normal in size. The mediastinum is unremarkable. Pulmonary vascularity normal. Lungs clear. No pleural effusion. There is an increase in the kyphotic curvature of thoracic spine. Degenerative changes are present. Impression: No acute abnormality     Ct Head Wo Cont    Result Date: 7/8/2017  CT HEAD WITHOUT CONTRAST. INDICATION: Weakness. COMPARISON: 5 January 2017  TECHNIQUE:   5 mm axial scans from the skull base to the vertex. Our CT scanners use one or more of the following:  Automated exposure control, adjustment of the mA and or kV according to patient size, iterative reconstruction. FINDINGS:  No acute intraparenchymal hemorrhage or abnormal extra-axial fluid collection. The ventricles are normal size. Extensive white matter low attenuation is present, nonspecific, likely chronic small vessel disease. No mass effect. Included portion of the paranasal sinuses and orbits grossly unremarkable. IMPRESSION:  Negative for acute intracranial abnormality. Chronic changes.

## 2017-07-08 NOTE — ED TRIAGE NOTES
Pt arrives via [facility] complaining of weakness. Pt was fine yesterday but has been having more incontinence issues, per family. Pt states she felt fuzzy in her head, states she has a history of UTIs. Pt confused in triage, doesn't know what year it is. Pt has been confused for months, per .

## 2017-07-08 NOTE — ED NOTES
I have reviewed discharge instructions with the patient and spouse. The patient and spouse verbalized understanding. One prescription given. Daughter of pt also was there for discharge instructions.  No further questions asked

## 2017-07-12 LAB
BACTERIA SPEC CULT: ABNORMAL
BACTERIA SPEC CULT: ABNORMAL
SERVICE CMNT-IMP: ABNORMAL

## 2017-07-16 ENCOUNTER — HOSPITAL ENCOUNTER (EMERGENCY)
Age: 72
Discharge: HOME | End: 2017-07-16
Attending: EMERGENCY MEDICINE
Payer: MEDICARE

## 2017-07-16 VITALS
HEART RATE: 79 BPM | WEIGHT: 141 LBS | RESPIRATION RATE: 18 BRPM | SYSTOLIC BLOOD PRESSURE: 121 MMHG | OXYGEN SATURATION: 97 % | BODY MASS INDEX: 25.95 KG/M2 | TEMPERATURE: 98.1 F | HEIGHT: 62 IN | DIASTOLIC BLOOD PRESSURE: 58 MMHG

## 2017-07-16 DIAGNOSIS — R53.1 WEAKNESS: Primary | ICD-10-CM

## 2017-07-16 LAB
ALBUMIN SERPL BCP-MCNC: 2.9 G/DL (ref 3.2–4.6)
ALBUMIN/GLOB SERPL: 0.9 {RATIO} (ref 1.2–3.5)
ALP SERPL-CCNC: 67 U/L (ref 50–136)
ALT SERPL-CCNC: 24 U/L (ref 12–65)
ANION GAP BLD CALC-SCNC: 6 MMOL/L (ref 7–16)
APPEARANCE UR: CLEAR
AST SERPL W P-5'-P-CCNC: 29 U/L (ref 15–37)
BASOPHILS # BLD AUTO: 0 K/UL (ref 0–0.2)
BASOPHILS # BLD: 1 % (ref 0–2)
BILIRUB SERPL-MCNC: 0.5 MG/DL (ref 0.2–1.1)
BILIRUB UR QL: NEGATIVE
BUN SERPL-MCNC: 12 MG/DL (ref 8–23)
CALCIUM SERPL-MCNC: 8.4 MG/DL (ref 8.3–10.4)
CHLORIDE SERPL-SCNC: 105 MMOL/L (ref 98–107)
CO2 SERPL-SCNC: 31 MMOL/L (ref 21–32)
COLOR UR: YELLOW
CREAT SERPL-MCNC: 0.87 MG/DL (ref 0.6–1)
DIFFERENTIAL METHOD BLD: ABNORMAL
EOSINOPHIL # BLD: 0.3 K/UL (ref 0–0.8)
EOSINOPHIL NFR BLD: 10 % (ref 0.5–7.8)
ERYTHROCYTE [DISTWIDTH] IN BLOOD BY AUTOMATED COUNT: 14.2 % (ref 11.9–14.6)
GLOBULIN SER CALC-MCNC: 3.1 G/DL (ref 2.3–3.5)
GLUCOSE SERPL-MCNC: 95 MG/DL (ref 65–100)
GLUCOSE UR STRIP.AUTO-MCNC: NEGATIVE MG/DL
HCT VFR BLD AUTO: 34.2 % (ref 35.8–46.3)
HGB BLD-MCNC: 11.7 G/DL (ref 11.7–15.4)
HGB UR QL STRIP: NEGATIVE
IMM GRANULOCYTES # BLD: 0 K/UL (ref 0–0.5)
IMM GRANULOCYTES NFR BLD AUTO: 0.4 % (ref 0–5)
KETONES UR QL STRIP.AUTO: NEGATIVE MG/DL
LEUKOCYTE ESTERASE UR QL STRIP.AUTO: NEGATIVE
LYMPHOCYTES # BLD AUTO: 30 % (ref 13–44)
LYMPHOCYTES # BLD: 0.8 K/UL (ref 0.5–4.6)
MCH RBC QN AUTO: 35.8 PG (ref 26.1–32.9)
MCHC RBC AUTO-ENTMCNC: 34.2 G/DL (ref 31.4–35)
MCV RBC AUTO: 104.6 FL (ref 79.6–97.8)
MONOCYTES # BLD: 0.2 K/UL (ref 0.1–1.3)
MONOCYTES NFR BLD AUTO: 9 % (ref 4–12)
NEUTS SEG # BLD: 1.3 K/UL (ref 1.7–8.2)
NEUTS SEG NFR BLD AUTO: 50 % (ref 43–78)
NITRITE UR QL STRIP.AUTO: NEGATIVE
PH UR STRIP: 5.5 [PH] (ref 5–9)
PLATELET # BLD AUTO: 85 K/UL (ref 150–450)
PMV BLD AUTO: 12.8 FL (ref 10.8–14.1)
POTASSIUM SERPL-SCNC: 3.7 MMOL/L (ref 3.5–5.1)
PROT SERPL-MCNC: 6 G/DL (ref 6.3–8.2)
PROT UR STRIP-MCNC: NEGATIVE MG/DL
RBC # BLD AUTO: 3.27 M/UL (ref 4.05–5.25)
SODIUM SERPL-SCNC: 142 MMOL/L (ref 136–145)
SP GR UR REFRACTOMETRY: 1.02 (ref 1–1.02)
UROBILINOGEN UR QL STRIP.AUTO: 0.2 EU/DL (ref 0.2–1)
WBC # BLD AUTO: 2.6 K/UL (ref 4.3–11.1)

## 2017-07-16 PROCEDURE — 85025 COMPLETE CBC W/AUTO DIFF WBC: CPT | Performed by: EMERGENCY MEDICINE

## 2017-07-16 PROCEDURE — 87086 URINE CULTURE/COLONY COUNT: CPT | Performed by: EMERGENCY MEDICINE

## 2017-07-16 PROCEDURE — 80053 COMPREHEN METABOLIC PANEL: CPT | Performed by: EMERGENCY MEDICINE

## 2017-07-16 PROCEDURE — 99282 EMERGENCY DEPT VISIT SF MDM: CPT | Performed by: EMERGENCY MEDICINE

## 2017-07-16 PROCEDURE — 81003 URINALYSIS AUTO W/O SCOPE: CPT | Performed by: EMERGENCY MEDICINE

## 2017-07-16 NOTE — ED NOTES
Pt from waiting room to bed 1. In to assess pt. Began asking pt questions and pt's family members would answer for pt. Requested that pt be allowed to answer questions and explained that this helps with neuro assessment, and requested that family members participate if pt relays inaccurate information. Pt's family members (son and daughter-in-law) at bedside. Pt reported h/o \"mini-strokes\" and stated that she fell today and was unable to get up, daughter assisted her up and \"carried\" her to the car and brought her to the ER. Pt denies pain or injury in fall. Pt states she is unable to \"use legs\". Began to do physical neuro assessment (pt unable to hold L leg off bed but is able to raise L leg independently). Dr. Itzel Tomlinson entered room, pt's daughter-in-law stated displeasure with length of wait and staff request to assess pt, stated they have already told \"someone\" why they are here and shouldn't have to repeat it. RN apologized for family members voiced concerns and again attempted to educate about need to evaluate pt, family member continued to be upset, RN excused from the room.

## 2017-07-16 NOTE — PROGRESS NOTES
Pt's daughter Ozzy Peterson [phone] asked to speak with LMSW as she was advised by pt's [facility] nurse that the  could advocate for the pt to assist with having her admitted to the hospital for care and placement for SNF rehab. Explained to her that the Physician will determine if pt meets that criteria for admission at this time. Further educated her that for medicare to cover SNF rehab a pt must have a three day in pt hospital stay in the past 30 days with a qualifying diagnosis to be referred for SNF rehab. Family are in the process of placing pt in an care home soon but are hopeful that she will be admitted to hospital as they are having difficulty with pt care in the home and feel she needs rehab. Offered verbal encouragement and support but daughter seems frustrated by circumstances.

## 2017-07-16 NOTE — ED NOTES
Discharge instructions given verbally and in written form to pt and pt's family. All verbalized understanding of instructions given. PIV removed and pt left the ER via w/c with family assistance, they refused any assistance from staff.

## 2017-07-16 NOTE — ED TRIAGE NOTES
Pt states she has no pain at this time. Pt states she had a stroke and is not getting any better and she feels like she cant function. Pt family states the pt collapsed last week and was diagnosed with a UTI. Pt was not able to get in her bed after being in the hospital. Pt family states the pt has not had any energy since yesterday. Family found the pt on the bathroom floor today. Family says the pt has not been able to use her legs today. Family states the pt is scheduled to go to nursing facility.

## 2017-07-16 NOTE — ED NOTES
In to bedside d/t call light, Dr. Isrrael Delgado at the bedside upon entering. Pt's daughter-in- law stated that call bell was put on prior to MD entering the room, and that pt needs to go to the bathroom. Pt's daughter-in-law visibly upset and then stated \"just unhook her from all this stuff so I can take her to the bathroom. \" then stated to pt \"I know how much you like your doctor, but we are never coming here again. \" RN apologized for delay getting to pt's room as she was in with another pt, but disconnected pt as quickly as possible. RN brought w/c to bedside, pt's daughter-in-law requested a different w/c. RN informed family that at the moment this was all that was available and offered assistance to get pt into w/c. Family refused RN assistance. RN escorted pt and family to bathroom to offer assistance to get pt on the commode, upon entering the bathroom pt's family member pushed bathroom door into RN stating \"we don't want your help, you have other pt's, i've got this\" and closed door in RN's face.

## 2017-07-16 NOTE — ED PROVIDER NOTES
HPI Comments: 24-year-old lady with a history of multiple myeloma who presents with concerns about progressive weakness since January. Apparently the patient had a stroke in January and since then she has had progressively difficult times being up moving and walking around. Family says that is got to a point where it is more difficult for them to help support her and they're trying to get her placed into a assisted facility. It were worried though because it got to the point where she is weak and is not able to stand without much assistance. Patient says that she has had no abdominal pain or chest pain and overall she feels  Fine just a little weak. Elements of this note were created using speech recognition software. As such, errors of speech recognition may be present. Patient is a 74 y.o. female presenting with fatigue. The history is provided by the patient. Fatigue   Pertinent negatives include no shortness of breath, no chest pain, no vomiting, no confusion, no headaches and no nausea.         Past Medical History:   Diagnosis Date    Anemia     Anxiety     Back ache     Benign paroxysmal positional vertigo     Cancer (HCC)     multiple myeloma    Contact dermatitis and other eczema, due to unspecified cause     Disease of esophagus     esophagitis    DVT (deep venous thrombosis) ([address] 2002    from thalidomide    Dyslipidemia     Edema     symptoms involving skin and other integumentary tissue, edema    Esophageal reflux     FHx: malignant neoplasm of gastrointestinal tract     GERD (gastroesophageal reflux disease)     Herpes zoster     without mention of complication    History of autologous stem cell transplant ([address] 10/2013    for myeloma-IgG kappa    HLD (hyperlipidemia)     Hypercholesterolemia     Multiple myeloma ([address] 1990    first abnormality dx 2000-in remission    Neuropathy ([address]     Peripheral neuropathy ([address]     Primary hypercoagulable state ([address]     Rosacea  Rosacea     Shingles     Shingles 2008    Stroke [facility])        Past Surgical History:   Procedure Laterality Date    HX  SECTION      x2    HX CHOLECYSTECTOMY  2016    HX COLONOSCOPY      ok EGD-done sldo    HX ENDOSCOPY  3/08    upper/lower endo-for heme pos stool/hiatal hernia and tics    HX HEENT  2014    thyroid cyst qbkveuw-Tkgklogwihwiqhndp-Ra. Norris Hammed    HX OTHER SURGICAL  3/06    port for venous access    HX TUBAL LIGATION           Family History:   Problem Relation Age of Onset    Stroke Mother     Heart Failure Mother     Other Mother      TIA's    Cancer Father      colon       Social History     Social History    Marital status:      Spouse name: N/A    Number of children: N/A    Years of education: N/A     Occupational History    Not on file. Social History Main Topics    Smoking status: Never Smoker    Smokeless tobacco: Never Used    Alcohol use No    Drug use: No    Sexual activity: Not on file     Other Topics Concern    Not on file     Social History Narrative         ALLERGIES: Review of patient's allergies indicates no known allergies. Review of Systems   Constitutional: Positive for fatigue. Negative for chills, diaphoresis and fever. HENT: Negative for congestion, rhinorrhea and sore throat. Eyes: Negative for redness and visual disturbance. Respiratory: Negative for cough, chest tightness, shortness of breath and wheezing. Cardiovascular: Negative for chest pain and palpitations. Gastrointestinal: Negative for abdominal pain, blood in stool, diarrhea, nausea and vomiting. Endocrine: Negative for polydipsia and polyuria. Genitourinary: Negative for dysuria and hematuria. Musculoskeletal: Negative for arthralgias, myalgias and neck stiffness. Skin: Negative for rash. Allergic/Immunologic: Negative for environmental allergies and food allergies. Neurological: Negative for dizziness, weakness and headaches. Hematological: Negative for adenopathy. Does not bruise/bleed easily. Psychiatric/Behavioral: Negative for confusion and sleep disturbance. The patient is not nervous/anxious. Vitals:    07/16/17 1248   BP: 121/58   Pulse: 79   Resp: 18   Temp: 98.1 °F (36.7 °C)   SpO2: 97%   Weight: 64 kg (141 lb)   Height: 5' 2\" (1.575 m)            Physical Exam   Constitutional: She is oriented to person, place, and time. She appears well-developed and well-nourished. HENT:   Head: Normocephalic and atraumatic. Eyes: Conjunctivae and EOM are normal. Pupils are equal, round, and reactive to light. Neck: Normal range of motion. Cardiovascular: Normal rate and regular rhythm. Pulmonary/Chest: Effort normal and breath sounds normal. No respiratory distress. She has no wheezes. She has no rales. She exhibits no tenderness. Abdominal: Soft. Bowel sounds are normal. There is no rebound and no guarding. Musculoskeletal: Normal range of motion. She exhibits no edema or tenderness. Lymphadenopathy:     She has no cervical adenopathy. Neurological: She is alert and oriented to person, place, and time. ppatient is able to hold both legs off the bed for approximately 15 seconds without significant difficulty. She has no pronator drift in her arms. Skin: Skin is warm and dry. Psychiatric: She has a normal mood and affect. Nursing note and vitals reviewed. MDM  Number of Diagnoses or Management Options  Diagnosis management comments: Patient's white count is a little bit low at 2.6 but given her history of multiple myeloma is not unexpected. The remainder of her blood work is baseline for her. sshe had a head CT scan done approximately a week ago which was unremarkable. Family was unhappy that we were unable to admit the patient to the hospital.  I did try to discuss with them that she does not  Currently have an urgent or acute medical problem.     ED Course       Procedures

## 2017-07-16 NOTE — ED NOTES
Pt bladder scanned and result 189mL. [address] cath performed and emptied 300mL of clear yellow urine from bladder. Urinalysis sent to lab.

## 2017-07-16 NOTE — DISCHARGE INSTRUCTIONS
Return with any fevers, vomiting, difficult breathing, worsening symptoms, or additional concerns. Follow-up with your primary care doctor for further evaluation as needed.

## 2017-07-19 LAB
BACTERIA SPEC CULT: NORMAL
SERVICE CMNT-IMP: NORMAL

## 2017-08-25 ENCOUNTER — HOSPITAL ENCOUNTER (INPATIENT)
Age: 72
LOS: 4 days | Discharge: NURSING, REHAB OR LONG-TERM CARE | DRG: 690 | End: 2017-08-31
Attending: EMERGENCY MEDICINE | Admitting: HOSPITALIST
Payer: MEDICARE

## 2017-08-25 ENCOUNTER — APPOINTMENT (OUTPATIENT)
Dept: GENERAL RADIOLOGY | Age: 72
DRG: 690 | End: 2017-08-25
Attending: NURSE PRACTITIONER
Payer: MEDICARE

## 2017-08-25 DIAGNOSIS — R53.1 WEAKNESS: Primary | ICD-10-CM

## 2017-08-25 PROBLEM — N39.0 UTI (URINARY TRACT INFECTION): Status: ACTIVE | Noted: 2017-08-25

## 2017-08-25 LAB
ALBUMIN SERPL-MCNC: 2.6 G/DL (ref 3.2–4.6)
ALBUMIN/GLOB SERPL: 0.7 {RATIO} (ref 1.2–3.5)
ALP SERPL-CCNC: 74 U/L (ref 50–136)
ALT SERPL-CCNC: 59 U/L (ref 12–65)
ANION GAP SERPL CALC-SCNC: 6 MMOL/L (ref 7–16)
AST SERPL-CCNC: 20 U/L (ref 15–37)
BACTERIA SPEC CULT: NORMAL
BACTERIA URNS QL MICRO: NORMAL /HPF
BASOPHILS # BLD: 0 K/UL (ref 0–0.2)
BASOPHILS NFR BLD: 0 % (ref 0–2)
BILIRUB SERPL-MCNC: 0.5 MG/DL (ref 0.2–1.1)
BUN SERPL-MCNC: 11 MG/DL (ref 8–23)
CALCIUM SERPL-MCNC: 8.3 MG/DL (ref 8.3–10.4)
CASTS URNS QL MICRO: NORMAL /LPF
CHLORIDE SERPL-SCNC: 98 MMOL/L (ref 98–107)
CO2 SERPL-SCNC: 31 MMOL/L (ref 21–32)
CREAT SERPL-MCNC: 0.71 MG/DL (ref 0.6–1)
DIFFERENTIAL METHOD BLD: ABNORMAL
EOSINOPHIL # BLD: 0 K/UL (ref 0–0.8)
EOSINOPHIL NFR BLD: 0 % (ref 0.5–7.8)
EPI CELLS #/AREA URNS HPF: 0 /HPF
ERYTHROCYTE [DISTWIDTH] IN BLOOD BY AUTOMATED COUNT: 14.3 % (ref 11.9–14.6)
GLOBULIN SER CALC-MCNC: 3.6 G/DL (ref 2.3–3.5)
GLUCOSE SERPL-MCNC: 100 MG/DL (ref 65–100)
HCT VFR BLD AUTO: 31.5 % (ref 35.8–46.3)
HGB BLD-MCNC: 10.9 G/DL (ref 11.7–15.4)
IMM GRANULOCYTES # BLD: 0 K/UL (ref 0–0.5)
IMM GRANULOCYTES NFR BLD: 0.3 % (ref 0–5)
LYMPHOCYTES # BLD: 1.3 K/UL (ref 0.5–4.6)
LYMPHOCYTES NFR BLD: 21 % (ref 13–44)
MAGNESIUM SERPL-MCNC: 1.7 MG/DL (ref 1.8–2.4)
MCH RBC QN AUTO: 35 PG (ref 26.1–32.9)
MCHC RBC AUTO-ENTMCNC: 34.6 G/DL (ref 31.4–35)
MCV RBC AUTO: 101.3 FL (ref 79.6–97.8)
MONOCYTES # BLD: 1 K/UL (ref 0.1–1.3)
MONOCYTES NFR BLD: 16 % (ref 4–12)
NEUTS SEG # BLD: 4 K/UL (ref 1.7–8.2)
NEUTS SEG NFR BLD: 63 % (ref 43–78)
PLATELET # BLD AUTO: 108 K/UL (ref 150–450)
PMV BLD AUTO: 11.9 FL (ref 10.8–14.1)
POTASSIUM SERPL-SCNC: 2.8 MMOL/L (ref 3.5–5.1)
PROT SERPL-MCNC: 6.2 G/DL (ref 6.3–8.2)
RBC # BLD AUTO: 3.11 M/UL (ref 4.05–5.25)
RBC #/AREA URNS HPF: NORMAL /HPF
SERVICE CMNT-IMP: NORMAL
SODIUM SERPL-SCNC: 135 MMOL/L (ref 136–145)
TROPONIN I SERPL-MCNC: <0.04 NG/ML (ref 0.02–0.05)
WBC # BLD AUTO: 6.4 K/UL (ref 4.3–11.1)
WBC URNS QL MICRO: NORMAL /HPF

## 2017-08-25 PROCEDURE — 84484 ASSAY OF TROPONIN QUANT: CPT | Performed by: NURSE PRACTITIONER

## 2017-08-25 PROCEDURE — 86580 TB INTRADERMAL TEST: CPT | Performed by: INTERNAL MEDICINE

## 2017-08-25 PROCEDURE — 99285 EMERGENCY DEPT VISIT HI MDM: CPT | Performed by: NURSE PRACTITIONER

## 2017-08-25 PROCEDURE — 81015 MICROSCOPIC EXAM OF URINE: CPT | Performed by: NURSE PRACTITIONER

## 2017-08-25 PROCEDURE — 74011250636 HC RX REV CODE- 250/636: Performed by: NURSE PRACTITIONER

## 2017-08-25 PROCEDURE — 74011000258 HC RX REV CODE- 258: Performed by: INTERNAL MEDICINE

## 2017-08-25 PROCEDURE — 96365 THER/PROPH/DIAG IV INF INIT: CPT | Performed by: NURSE PRACTITIONER

## 2017-08-25 PROCEDURE — 81003 URINALYSIS AUTO W/O SCOPE: CPT | Performed by: NURSE PRACTITIONER

## 2017-08-25 PROCEDURE — 87086 URINE CULTURE/COLONY COUNT: CPT | Performed by: NURSE PRACTITIONER

## 2017-08-25 PROCEDURE — 80053 COMPREHEN METABOLIC PANEL: CPT | Performed by: NURSE PRACTITIONER

## 2017-08-25 PROCEDURE — 83735 ASSAY OF MAGNESIUM: CPT | Performed by: INTERNAL MEDICINE

## 2017-08-25 PROCEDURE — 74011000302 HC RX REV CODE- 302: Performed by: INTERNAL MEDICINE

## 2017-08-25 PROCEDURE — 85025 COMPLETE CBC W/AUTO DIFF WBC: CPT | Performed by: NURSE PRACTITIONER

## 2017-08-25 PROCEDURE — 71020 XR CHEST PA LAT: CPT

## 2017-08-25 PROCEDURE — 99218 HC RM OBSERVATION: CPT

## 2017-08-25 PROCEDURE — 87088 URINE BACTERIA CULTURE: CPT | Performed by: NURSE PRACTITIONER

## 2017-08-25 PROCEDURE — 99284 EMERGENCY DEPT VISIT MOD MDM: CPT | Performed by: NURSE PRACTITIONER

## 2017-08-25 PROCEDURE — 74011250637 HC RX REV CODE- 250/637: Performed by: INTERNAL MEDICINE

## 2017-08-25 PROCEDURE — 87186 SC STD MICRODIL/AGAR DIL: CPT | Performed by: NURSE PRACTITIONER

## 2017-08-25 PROCEDURE — 87641 MR-STAPH DNA AMP PROBE: CPT | Performed by: INTERNAL MEDICINE

## 2017-08-25 PROCEDURE — 74011250636 HC RX REV CODE- 250/636: Performed by: INTERNAL MEDICINE

## 2017-08-25 PROCEDURE — 93005 ELECTROCARDIOGRAM TRACING: CPT | Performed by: NURSE PRACTITIONER

## 2017-08-25 RX ORDER — POTASSIUM CHLORIDE 14.9 MG/ML
20 INJECTION INTRAVENOUS
Status: COMPLETED | OUTPATIENT
Start: 2017-08-25 | End: 2017-08-26

## 2017-08-25 RX ORDER — MONTELUKAST SODIUM 4 MG/1
1 TABLET, CHEWABLE ORAL 2 TIMES DAILY
Status: DISCONTINUED | OUTPATIENT
Start: 2017-08-25 | End: 2017-08-31 | Stop reason: HOSPADM

## 2017-08-25 RX ORDER — LANOLIN ALCOHOL/MO/W.PET/CERES
1000 CREAM (GRAM) TOPICAL DAILY
Status: DISCONTINUED | OUTPATIENT
Start: 2017-08-26 | End: 2017-08-31 | Stop reason: HOSPADM

## 2017-08-25 RX ORDER — SODIUM CHLORIDE 9 MG/ML
100 INJECTION, SOLUTION INTRAVENOUS CONTINUOUS
Status: DISCONTINUED | OUTPATIENT
Start: 2017-08-25 | End: 2017-08-29

## 2017-08-25 RX ORDER — PANTOPRAZOLE SODIUM 40 MG/1
40 TABLET, DELAYED RELEASE ORAL
Status: DISCONTINUED | OUTPATIENT
Start: 2017-08-26 | End: 2017-08-31 | Stop reason: HOSPADM

## 2017-08-25 RX ORDER — SODIUM CHLORIDE 0.9 % (FLUSH) 0.9 %
5-10 SYRINGE (ML) INJECTION AS NEEDED
Status: DISCONTINUED | OUTPATIENT
Start: 2017-08-25 | End: 2017-08-31 | Stop reason: HOSPADM

## 2017-08-25 RX ORDER — DEXAMETHASONE 4 MG/1
4 TABLET ORAL
Status: DISCONTINUED | OUTPATIENT
Start: 2017-08-28 | End: 2017-08-31 | Stop reason: HOSPADM

## 2017-08-25 RX ORDER — FLUOXETINE HYDROCHLORIDE 20 MG/1
20 CAPSULE ORAL DAILY
Status: DISCONTINUED | OUTPATIENT
Start: 2017-08-26 | End: 2017-08-31 | Stop reason: HOSPADM

## 2017-08-25 RX ORDER — PREGABALIN 50 MG/1
100 CAPSULE ORAL 2 TIMES DAILY
Status: DISCONTINUED | OUTPATIENT
Start: 2017-08-25 | End: 2017-08-31 | Stop reason: HOSPADM

## 2017-08-25 RX ORDER — SODIUM CHLORIDE 0.9 % (FLUSH) 0.9 %
5-10 SYRINGE (ML) INJECTION EVERY 8 HOURS
Status: DISCONTINUED | OUTPATIENT
Start: 2017-08-25 | End: 2017-08-31 | Stop reason: HOSPADM

## 2017-08-25 RX ORDER — PRAVASTATIN SODIUM 20 MG/1
40 TABLET ORAL
Status: DISCONTINUED | OUTPATIENT
Start: 2017-08-25 | End: 2017-08-31 | Stop reason: HOSPADM

## 2017-08-25 RX ORDER — ENOXAPARIN SODIUM 100 MG/ML
40 INJECTION SUBCUTANEOUS EVERY 24 HOURS
Status: DISCONTINUED | OUTPATIENT
Start: 2017-08-26 | End: 2017-08-31 | Stop reason: HOSPADM

## 2017-08-25 RX ORDER — CLOPIDOGREL BISULFATE 75 MG/1
75 TABLET ORAL DAILY
Status: DISCONTINUED | OUTPATIENT
Start: 2017-08-26 | End: 2017-08-31 | Stop reason: HOSPADM

## 2017-08-25 RX ORDER — ACETAMINOPHEN 325 MG/1
650 TABLET ORAL
Status: DISCONTINUED | OUTPATIENT
Start: 2017-08-25 | End: 2017-08-31 | Stop reason: HOSPADM

## 2017-08-25 RX ORDER — ONDANSETRON 2 MG/ML
4 INJECTION INTRAMUSCULAR; INTRAVENOUS
Status: DISCONTINUED | OUTPATIENT
Start: 2017-08-25 | End: 2017-08-31 | Stop reason: HOSPADM

## 2017-08-25 RX ORDER — CLORAZEPATE DIPOTASSIUM 7.5 MG/1
3.75 TABLET ORAL DAILY
Status: DISCONTINUED | OUTPATIENT
Start: 2017-08-26 | End: 2017-08-31 | Stop reason: HOSPADM

## 2017-08-25 RX ADMIN — POTASSIUM CHLORIDE 20 MEQ: 200 INJECTION, SOLUTION INTRAVENOUS at 15:13

## 2017-08-25 RX ADMIN — PREGABALIN 100 MG: 50 CAPSULE ORAL at 20:59

## 2017-08-25 RX ADMIN — SODIUM CHLORIDE 100 ML/HR: 900 INJECTION, SOLUTION INTRAVENOUS at 17:45

## 2017-08-25 RX ADMIN — Medication 10 ML: at 21:00

## 2017-08-25 RX ADMIN — TUBERCULIN PURIFIED PROTEIN DERIVATIVE 5 UNITS: 5 INJECTION, SOLUTION INTRADERMAL at 17:59

## 2017-08-25 RX ADMIN — CEFTRIAXONE 1 G: 1 INJECTION, POWDER, FOR SOLUTION INTRAMUSCULAR; INTRAVENOUS at 17:53

## 2017-08-25 RX ADMIN — PRAVASTATIN SODIUM 40 MG: 20 TABLET ORAL at 21:02

## 2017-08-25 NOTE — H&P
Hospitalist H&P Note     Admit Date:  2017  1:36 PM   Name:  Tami Wolfe   Age:  74 y.o.  :  1945   MRN:  [id number]   PCP:  Barbara Claudio MD  Treatment Team: Attending Provider: Donna Cuenca MD; Primary Nurse: Ros Mukherjee RN    HPI:       Ms. Radha Velasquez is a 75 yo female with PMH of multiple myeloma, CVA, memory loss, recurrent UTI evaluated for progressive weakness and concern for recurrent UTI. Presently lives at [address] [place] [address]. Follows with [facility] neurology and had recent memory testing. Has taken several rounds of cipro without movement. Last urine cx 17 with ECOLI and Klebsiella, sensitive to rocephin.  and son at bedside report anorexia, some loose BM.          10 systems limited per memory loss         Past Medical History:   Diagnosis Date    Anemia     Anxiety     Back ache     Benign paroxysmal positional vertigo     Cancer (HCC)     multiple myeloma    Contact dermatitis and other eczema, due to unspecified cause     Disease of esophagus     esophagitis    DVT (deep venous thrombosis) ([address]     from thalidomide    Dyslipidemia     Edema     symptoms involving skin and other integumentary tissue, edema    Esophageal reflux     FHx: malignant neoplasm of gastrointestinal tract     GERD (gastroesophageal reflux disease)     Herpes zoster     without mention of complication    History of autologous stem cell transplant ([address] 10/2013    for myeloma-IgG kappa    HLD (hyperlipidemia)     Hypercholesterolemia     Multiple myeloma ([address]     first abnormality dx -in remission    Neuropathy ([address]     Peripheral neuropathy ([address]     Primary hypercoagulable state ([address]     Rosacea     Rosacea     Shingles     Shingles 2008    Stroke [facility])       Past Surgical History:   Procedure Laterality Date    HX  SECTION      x2    HX CHOLECYSTECTOMY  2016    HX COLONOSCOPY      ok EGD-done sldo    HX ENDOSCOPY  3/08 upper/lower endo-for heme pos stool/hiatal hernia and tics    HX HEENT  4/2014    thyroid cyst qxmmixo-Wybwkyxhpkvckfgla-On. Dania Chi    HX OTHER SURGICAL  3/06    port for venous access    HX TUBAL LIGATION        No Known Allergies   Social History   Substance Use Topics    Smoking status: Never Smoker    Smokeless tobacco: Never Used    Alcohol use No      Family History   Problem Relation Age of Onset    Stroke Mother     Heart Failure Mother     Other Mother      TIA's    Cancer Father      colon      Immunization History   Administered Date(s) Administered    DTaP-Hep B-IPV 11/14/2014, 01/15/2015, 03/16/2015    Hib (PRP-T) 05/13/2014, 07/14/2014, 09/15/2014    Influenza High Dose Vaccine PF 11/14/2014, 11/16/2015    Influenza Nasal Vaccine 12/12/2016    Influenza Vaccine 10/05/2015    MMR 11/16/2015    Meningococcal (MCV4P) Vaccine 11/16/2015    Pneumococcal Conjugate (PCV-13) 05/13/2014, 07/14/2014, 09/15/2014, 05/01/2015    Pneumococcal Polysaccharide (PPSV-23) 11/14/2014    Pneumococcal Vaccine (Unspecified Type) 01/01/2014     PTA Medications:  Prior to Admission Medications   Prescriptions Last Dose Informant Patient Reported? Taking? B.infantis-B.ani-B.long-B.bifi (PROBIOTIC 4X) 10-15 mg TbEC   No No   Sig: Take 1 Tab by mouth daily. FLUoxetine (PROZAC) 20 mg tablet   No No   Sig: Take 1 Tab by mouth daily. LYRICA 100 mg capsule   No No   Sig: Take 1 Cap by mouth two (2) times a day. Max Daily Amount: 200 mg.   ciprofloxacin HCl (CIPRO) 250 mg tablet   No No   Sig: Take 1 Tab by mouth two (2) times a day. clopidogrel (PLAVIX) 75 mg tab   No No   Sig: Take 1 Tab by mouth daily. clorazepate (TRANXENE) 3.75 mg tablet   No No   Sig: Take 1 Tab by mouth daily. Max Daily Amount: 3.75 mg.   colestipol (COLESTID) 1 gram tablet   No No   Sig: TAKE 1 TABLET BY MOUTH TWICE DAILY. cyanocobalamin (VITAMIN B-12) 1,000 mcg tablet   Yes No   Sig: Take 1,000 mcg by mouth daily. dexamethasone (DECADRON) 4 mg tablet   No No   Sig: Take 1 Tab by mouth every Monday. esomeprazole (NEXIUM) 40 mg capsule   No No   Sig: Take 1 Cap by mouth daily. lenalidomide (REVLIMID) 15 mg cap   No No   Sig: Take 1 Cap by mouth every twenty-one (21) days. Indications: MULTIPLE MYELOMA   multivitamins-minerals-lutein (CENTRUM SILVER) tab tablet   No No   Sig: Take 1 Tab by mouth daily. potassium chloride (KLOR-CON) 10 mEq tablet   No No   Sig: Take 1 Tab by mouth three (3) times daily. pravastatin (PRAVACHOL) 40 mg tablet   No No   Sig: Take 1 Tab by mouth nightly. pyridoxine, vitamin B6, (VITAMIN B-6) 100 mg tablet   No No   Sig: Take 1 Tab by mouth two (2) times a day. Facility-Administered Medications: None       Objective:   Patient Vitals for the past 24 hrs:   Temp Pulse Resp BP SpO2   08/25/17 1653 99.6 °F (37.6 °C) 76 16 122/60 99 %   08/25/17 1555 - 70 19 137/73 96 %   08/25/17 1535 - 77 15 142/64 98 %   08/25/17 1515 - 70 12 135/69 97 %   08/25/17 1440 - 72 (!) 34 153/69 98 %   08/25/17 1425 - 77 12 156/68 96 %   08/25/17 1344 98.4 °F (36.9 °C) 68 14 144/61 98 %     Oxygen Therapy  O2 Sat (%): 99 % (08/25/17 1653)  Pulse via Oximetry: 70 beats per minute (08/25/17 1555)  O2 Device: Room air (08/25/17 1344)  No intake or output data in the 24 hours ending 08/25/17 1717    Physical Exam:  General:    Well nourished. Alert. Eyes:   Normal sclera. Extraocular movements intact. ENT:  Normocephalic, atraumatic. Moist mucous membranes  CV:   RRR. No murmur, rub, or gallop. Lungs:  CTAB. No wheezing, rhonchi, or rales. Anterior exam    Abdomen: Soft, nontender, nondistended. Bowel sounds normal.   Extremities: Warm and dry. No cyanosis or edema. Neurologic: CN II-XII grossly intact. Moves all extremities an follows commands  Skin:     No rashes or jaundice. Psych:  Normal mood and affect.     I reviewed the labs, imaging, EKGs, telemetry, and other studies done this admission. Data Review:   Recent Results (from the past 24 hour(s))   CBC WITH AUTOMATED DIFF    Collection Time: 08/25/17  1:44 PM   Result Value Ref Range    WBC 6.4 4.3 - 11.1 K/uL    RBC 3.11 (L) 4.05 - 5.25 M/uL    HGB 10.9 (L) 11.7 - 15.4 g/dL    HCT 31.5 (L) 35.8 - 46.3 %    .3 (H) 79.6 - 97.8 FL    MCH 35.0 (H) 26.1 - 32.9 PG    MCHC 34.6 31.4 - 35.0 g/dL    RDW 14.3 11.9 - 14.6 %    PLATELET 947 (L) 462 - 450 K/uL    MPV 11.9 10.8 - 14.1 FL    DF AUTOMATED      NEUTROPHILS 63 43 - 78 %    LYMPHOCYTES 21 13 - 44 %    MONOCYTES 16 (H) 4.0 - 12.0 %    EOSINOPHILS 0 (L) 0.5 - 7.8 %    BASOPHILS 0 0.0 - 2.0 %    IMMATURE GRANULOCYTES 0.3 0.0 - 5.0 %    ABS. NEUTROPHILS 4.0 1.7 - 8.2 K/UL    ABS. LYMPHOCYTES 1.3 0.5 - 4.6 K/UL    ABS. MONOCYTES 1.0 0.1 - 1.3 K/UL    ABS. EOSINOPHILS 0.0 0.0 - 0.8 K/UL    ABS. BASOPHILS 0.0 0.0 - 0.2 K/UL    ABS. IMM. GRANS. 0.0 0.0 - 0.5 K/UL   METABOLIC PANEL, COMPREHENSIVE    Collection Time: 08/25/17  1:44 PM   Result Value Ref Range    Sodium 135 (L) 136 - 145 mmol/L    Potassium 2.8 (LL) 3.5 - 5.1 mmol/L    Chloride 98 98 - 107 mmol/L    CO2 31 21 - 32 mmol/L    Anion gap 6 (L) 7 - 16 mmol/L    Glucose 100 65 - 100 mg/dL    BUN 11 8 - 23 MG/DL    Creatinine 0.71 0.6 - 1.0 MG/DL    GFR est AA >60 >60 ml/min/1.73m2    GFR est non-AA >60 >60 ml/min/1.73m2    Calcium 8.3 8.3 - 10.4 MG/DL    Bilirubin, total 0.5 0.2 - 1.1 MG/DL    ALT (SGPT) 59 12 - 65 U/L    AST (SGOT) 20 15 - 37 U/L    Alk.  phosphatase 74 50 - 136 U/L    Protein, total 6.2 (L) 6.3 - 8.2 g/dL    Albumin 2.6 (L) 3.2 - 4.6 g/dL    Globulin 3.6 (H) 2.3 - 3.5 g/dL    A-G Ratio 0.7 (L) 1.2 - 3.5     TROPONIN I    Collection Time: 08/25/17  1:44 PM   Result Value Ref Range    Troponin-I, Qt. <0.04 0.02 - 0.05 NG/ML   URINE MICROSCOPIC    Collection Time: 08/25/17  2:41 PM   Result Value Ref Range    WBC  0 /hpf    RBC 0-3 0 /hpf    Epithelial cells 0 0 /hpf    Bacteria TRACE 0 /hpf    Casts 0-3 0 /lpf All Micro Results     Procedure Component Value Units Date/Time    CULTURE, URINE [[id number]]     Order Status:  Sent Specimen:  Urine from Clean catch     CULTURE, URINE [[id number]] Collected:  08/25/17 1441    Order Status:  Completed Specimen:  Urine from Clean catch Updated:  08/25/17 1450          Other Studies:  Xr Chest Pa Lat    Result Date: 8/25/2017  CHEST X-RAY AP AND LATERAL : 8/25/2017 Indication: Weakness, UTI Comparison: 7/8/2017 Findings: The lung fields are clear. The heart, mediastinum, soft tissues, and bony structures are remarkable for thoracic scoliosis and cardiomegaly. IMPRESSION: Thoracic scoliosis, cardiomegaly, clear lung fields.       Assessment and Plan:     Hospital Problems as of 8/25/2017  Date Reviewed: 7/31/2017          Codes Class Noted - Resolved POA    * (Principal)UTI (urinary tract infection) ICD-10-CM: N39.0  ICD-9-CM: 599.0  8/25/2017 - Present Yes        Cerebrovascular accident (CVA) ([facility] ICD-10-CM: I63.9  ICD-9-CM: 434.91  3/1/2017 - Present Yes    Overview Signed 6/16/2017 11:10 AM by Asia Cobian LPN     Overview:   Etiology unclear without results of tests done    Last Assessment & Plan:   Needs f/u PRN             Hypokalemia ICD-10-CM: E87.6  ICD-9-CM: 276.8  9/30/2016 - Present Yes        Drug-induced polyneuropathy ([facility] ICD-10-CM: G62.0  ICD-9-CM: 357.6, E980.5  9/30/2016 - Present Yes        IgG multiple myeloma ([facility] ICD-10-CM: C90.00  ICD-9-CM: 203.00  1/20/2016 - Present Yes    Overview Signed 1/20/2016 10:02 AM by Marylene Hope, MD     kappa-prior stem cell transplant-M spike 0.2 12/2015                   PLAN:  · Admit to medical bed   · IVF and IV rocephin, follow urine culture   · Replace potassium,check magnesium   · Consider neurological workup if fails to improve with UTI management   · PT/OT/PPD and SW for dispo    DVT ppx:  lovenox  Anticipated DC needs:  Pending   Code status:  Full  Estimated LOS:  Greater than 2 midnights  Risk: [facility] plan: [phone]   Signed:  Radha Butler MD

## 2017-08-25 NOTE — PROGRESS NOTES
Visited with patient who is pleasantly confused,  Andrea Finley is at the bedside and does most of the talking. States she uses a walker and can bathe and dress herself as long as she does not have a UTI. States when she gets the UTI she is unable to perform ADLs at all. States she has been receiving PT and is current with [facility] Oncologist initiated that back in May and they are still seeing patient. Call to Director Sobia Sebastian at [facility] ([phone]) who states patient has had repeated UTI's since May of this year and as soon as she finishes the Cipro she gets another one. States she is very manageable when she does not have the UTI but when she does her behavior completely changes and she goes to a two person assist.  Believes patient would benefit from prophylactic daily med to prevent the UTI from recurring. Discussed whether or not she feels patient will benefit from rehab. States she does not think the patient has the mental capability for aggressive rehab and sometimes has difficulty following commands. States they have PT, OT and speech at their facility and can do that on a lower, less aggressive level if needed.

## 2017-08-25 NOTE — ED TRIAGE NOTES
[facility]. C/o increasing weakness x2 days with recent dx of UTI. Taking cipro po x 2 days and had a rocephin injection yesterday. /80, HR 70 nsr with pvc. BGL 85, oral temp 98. 2. pta given 250 ml of ivf ns.

## 2017-08-25 NOTE — PROGRESS NOTES
Admission assessment completed. Pt is alert to self only. Lungs clear, breathing non-labored. Bowel sounds + q 4 denies constipation. Incontinent of bowel and bladder. Bed alarm on for safety. Skin examined by 2 Rns  Safety measures in place. Continue to monitor.

## 2017-08-25 NOTE — ED PROVIDER NOTES
HPI Comments: 73 y/o f to ed via ems from her memory care assisted living facility with complaint of weakness. She answers questions yes and no. Repeats \"my family thinks I have collins Gary Challenger. \"  When asked if she has pain, fever, chills, nausea or vomiting, chest pain or palpitations, diarrhea or back pain she says no. I asked if she feels bad and she responds yes. I asked if problems started a couple of days ago and she responds yes. She faces to name, and smiled at the end of our exam    Patient is a 74 y.o. female presenting with fatigue. The history is provided by the patient. No  was used. Fatigue   The current episode started 2 days ago. The problem has not changed since onset. There was no focality noted. There has been no fever. Associated medical issues include dementia and CVA.         Past Medical History:   Diagnosis Date    Anemia     Anxiety     Back ache     Benign paroxysmal positional vertigo     Cancer ([address]     multiple myeloma    Contact dermatitis and other eczema, due to unspecified cause     Disease of esophagus     esophagitis    DVT (deep venous thrombosis) ([address]     from thalidomide    Dyslipidemia     Edema     symptoms involving skin and other integumentary tissue, edema    Esophageal reflux     FHx: malignant neoplasm of gastrointestinal tract     GERD (gastroesophageal reflux disease)     Herpes zoster     without mention of complication    History of autologous stem cell transplant ([address] 10/2013    for myeloma-IgG kappa    HLD (hyperlipidemia)     Hypercholesterolemia     Multiple myeloma ([address]     first abnormality dx -in remission    Neuropathy ([address]     Peripheral neuropathy ([address]     Primary hypercoagulable state ([address]     Rosacea     Rosacea     Shingles     Shingles 2008    Stroke [facility])        Past Surgical History:   Procedure Laterality Date    HX  SECTION      x2    HX CHOLECYSTECTOMY  2016    HX COLONOSCOPY  11/03    ok EGD-done sldo    HX ENDOSCOPY  3/08    upper/lower endo-for heme pos stool/hiatal hernia and tics    HX HEENT  4/2014    thyroid cyst genjmay-[facility]eg[facility]xxg-[facility]    HX OTHER SURGICAL  3/06    port for venous access    HX TUBAL LIGATION           Family History:   Problem Relation Age of Onset    Stroke Mother     Heart Failure Mother     Other Mother      TIA's    Cancer Father      colon       Social History     Social History    Marital status:      Spouse name: N/A    Number of children: N/A    Years of education: N/A     Occupational History    Not on file. Social History Main Topics    Smoking status: Never Smoker    Smokeless tobacco: Never Used    Alcohol use No    Drug use: No    Sexual activity: Not on file     Other Topics Concern    Not on file     Social History Narrative         ALLERGIES: Review of patient's allergies indicates no known allergies. Review of Systems   Constitutional: Positive for fatigue. Vitals:    08/25/17 1338 08/25/17 1344   BP:  144/61   Pulse:  68   Resp:  14   Temp:  98.4 °F (36.9 °C)   SpO2:  98%   Weight: 65.8 kg (145 lb)    Height: 5' 2\" (1.575 m)             Physical Exam   Constitutional: She appears well-developed and well-nourished. No distress. HENT:   Head: Normocephalic and atraumatic. Right Ear: External ear normal.   Left Ear: External ear normal.   Nose: Nose normal.   Eyes: Conjunctivae and EOM are normal. Pupils are equal, round, and reactive to light. Neck: Normal range of motion. Neck supple. Cardiovascular: Normal rate, regular rhythm and normal heart sounds. Pulmonary/Chest: Effort normal and breath sounds normal. No respiratory distress. She has no wheezes. Abdominal: Soft. Bowel sounds are normal. She exhibits no distension. There is no tenderness. Musculoskeletal: Normal range of motion. She exhibits edema (1+ edema bilat lower ext). She exhibits no tenderness. Neurological: She is alert. No cranial nerve deficit. Pt moves her upper extremities with ease,  strong bilat. Does not move her lower extremities for me, says she can not. Skin: Skin is warm and dry. No rash noted. Psychiatric: She has a normal mood and affect. Her behavior is normal. Judgment and thought content normal.   Nursing note and vitals reviewed. MDM  Number of Diagnoses or Management Options  Diagnosis management comments: 73 y/o f to ed via ems from her memory care assisted living facility with complaint of weakness. She answers questions yes and no. Repeats \"my family thinks I have collins Victoria. \"  When asked if she has pain, fever, chills, nausea or vomiting, chest pain or palpitations, diarrhea or back pain she says no. I asked if she feels bad and she responds yes. I asked if problems started a couple of days ago and she responds yes. She faces to name, and smiled at the end of our exam  3:35 PM   at bedside now. Reports general decline over the last two mos. 6 weeks ago pt was walking with walker but now is bed bound essentially. Has been treated 3 times in two mos with cipro for uti. Pt had some nausea with vomiting at least once yesterday but he is not sure how many times. He tells us she has been much less interactive the last few weeks. Sx of dementia started in January this year. 3:47 PM    D/w dr Akash Heller, will admit. D/w dr Sydney Chang and he will admit.  Pt and  aware       Amount and/or Complexity of Data Reviewed  Clinical lab tests: ordered and reviewed  Tests in the radiology section of CPT®: ordered and reviewed  Discuss the patient with other providers: yes (randy Short)    Risk of Complications, Morbidity, and/or Mortality  Presenting problems: minimal  Diagnostic procedures: minimal  Management options: low    Patient Progress  Patient progress: stable    ED Course       Procedures

## 2017-08-25 NOTE — PROGRESS NOTES
TRANSFER - IN REPORT:    Verbal report received from Vincent Joiner, (name) on Collin King  being received from ER (unit) for routine progression of care      Report consisted of patients Situation, Background, Assessment and   Recommendations(SBAR). Information from the following report(s) SBAR, Kardex and Intake/Output was reviewed with the receiving nurse. Opportunity for questions and clarification was provided. Assessment completed upon patients arrival to unit and care assumed.

## 2017-08-25 NOTE — ED NOTES
TRANSFER - OUT REPORT:    Verbal report given to KATY Wood. on [facility]  being transferred to  for routine progression of care       Report consisted of patients Situation, Background, Assessment and   Recommendations(SBAR). Information from the following report(s) SBAR, ED Summary, Intake/Output, MAR and Cardiac Rhythm NSR with PVC was reviewed with the receiving nurse. Lines:   Peripheral IV 08/25/17 Right Wrist (Active)   Site Assessment Clean, dry, & intact 8/25/2017  1:46 PM   Phlebitis Assessment 0 8/25/2017  1:46 PM   Infiltration Assessment 0 8/25/2017  1:46 PM   Dressing Status Clean, dry, & intact 8/25/2017  1:46 PM        Opportunity for questions and clarification was provided. VTE prophylaxis orders have not been written for [facility]. Patient and family given floor number and nurses name. Family updated re: pt status after security code verified.

## 2017-08-26 LAB
ANION GAP SERPL CALC-SCNC: 13 MMOL/L (ref 7–16)
ATRIAL RATE: 73 BPM
BASOPHILS # BLD: 0 K/UL (ref 0–0.2)
BASOPHILS NFR BLD: 0 % (ref 0–2)
BUN SERPL-MCNC: 9 MG/DL (ref 8–23)
CALCIUM SERPL-MCNC: 8.4 MG/DL (ref 8.3–10.4)
CALCULATED P AXIS, ECG09: 56 DEGREES
CALCULATED R AXIS, ECG10: -4 DEGREES
CALCULATED T AXIS, ECG11: 32 DEGREES
CHLORIDE SERPL-SCNC: 101 MMOL/L (ref 98–107)
CO2 SERPL-SCNC: 28 MMOL/L (ref 21–32)
CREAT SERPL-MCNC: 0.52 MG/DL (ref 0.6–1)
DIAGNOSIS, 93000: NORMAL
DIFFERENTIAL METHOD BLD: ABNORMAL
EOSINOPHIL # BLD: 0 K/UL (ref 0–0.8)
EOSINOPHIL NFR BLD: 0 % (ref 0.5–7.8)
ERYTHROCYTE [DISTWIDTH] IN BLOOD BY AUTOMATED COUNT: 14.7 % (ref 11.9–14.6)
GLUCOSE SERPL-MCNC: 83 MG/DL (ref 65–100)
HCT VFR BLD AUTO: 38.1 % (ref 35.8–46.3)
HGB BLD-MCNC: 12.8 G/DL (ref 11.7–15.4)
IMM GRANULOCYTES # BLD: 0 K/UL (ref 0–0.5)
IMM GRANULOCYTES NFR BLD: 0.4 % (ref 0–5)
LYMPHOCYTES # BLD: 1.1 K/UL (ref 0.5–4.6)
LYMPHOCYTES NFR BLD: 20 % (ref 13–44)
MCH RBC QN AUTO: 34.9 PG (ref 26.1–32.9)
MCHC RBC AUTO-ENTMCNC: 33.6 G/DL (ref 31.4–35)
MCV RBC AUTO: 103.8 FL (ref 79.6–97.8)
MONOCYTES # BLD: 0.8 K/UL (ref 0.1–1.3)
MONOCYTES NFR BLD: 16 % (ref 4–12)
NEUTS SEG # BLD: 3.3 K/UL (ref 1.7–8.2)
NEUTS SEG NFR BLD: 64 % (ref 43–78)
P-R INTERVAL, ECG05: 156 MS
PLATELET # BLD AUTO: 123 K/UL (ref 150–450)
PMV BLD AUTO: 12.5 FL (ref 10.8–14.1)
POTASSIUM SERPL-SCNC: 3.9 MMOL/L (ref 3.5–5.1)
Q-T INTERVAL, ECG07: 422 MS
QRS DURATION, ECG06: 100 MS
QTC CALCULATION (BEZET), ECG08: 464 MS
RBC # BLD AUTO: 3.67 M/UL (ref 4.05–5.25)
SODIUM SERPL-SCNC: 142 MMOL/L (ref 136–145)
VENTRICULAR RATE, ECG03: 73 BPM
WBC # BLD AUTO: 5.2 K/UL (ref 4.3–11.1)

## 2017-08-26 PROCEDURE — G8979 MOBILITY GOAL STATUS: HCPCS

## 2017-08-26 PROCEDURE — G8987 SELF CARE CURRENT STATUS: HCPCS

## 2017-08-26 PROCEDURE — 85025 COMPLETE CBC W/AUTO DIFF WBC: CPT | Performed by: INTERNAL MEDICINE

## 2017-08-26 PROCEDURE — 36415 COLL VENOUS BLD VENIPUNCTURE: CPT | Performed by: INTERNAL MEDICINE

## 2017-08-26 PROCEDURE — 74011000258 HC RX REV CODE- 258: Performed by: INTERNAL MEDICINE

## 2017-08-26 PROCEDURE — G8996 SWALLOW CURRENT STATUS: HCPCS

## 2017-08-26 PROCEDURE — 74011250636 HC RX REV CODE- 250/636: Performed by: INTERNAL MEDICINE

## 2017-08-26 PROCEDURE — 97161 PT EVAL LOW COMPLEX 20 MIN: CPT

## 2017-08-26 PROCEDURE — 80048 BASIC METABOLIC PNL TOTAL CA: CPT | Performed by: INTERNAL MEDICINE

## 2017-08-26 PROCEDURE — 74011250637 HC RX REV CODE- 250/637: Performed by: HOSPITALIST

## 2017-08-26 PROCEDURE — 97165 OT EVAL LOW COMPLEX 30 MIN: CPT

## 2017-08-26 PROCEDURE — G8978 MOBILITY CURRENT STATUS: HCPCS

## 2017-08-26 PROCEDURE — 99218 HC RM OBSERVATION: CPT

## 2017-08-26 PROCEDURE — G8997 SWALLOW GOAL STATUS: HCPCS

## 2017-08-26 PROCEDURE — G8998 SWALLOW D/C STATUS: HCPCS

## 2017-08-26 PROCEDURE — G8988 SELF CARE GOAL STATUS: HCPCS

## 2017-08-26 PROCEDURE — 74011250637 HC RX REV CODE- 250/637: Performed by: INTERNAL MEDICINE

## 2017-08-26 PROCEDURE — 92610 EVALUATE SWALLOWING FUNCTION: CPT

## 2017-08-26 RX ORDER — LANOLIN ALCOHOL/MO/W.PET/CERES
400 CREAM (GRAM) TOPICAL 2 TIMES DAILY
Status: DISCONTINUED | OUTPATIENT
Start: 2017-08-26 | End: 2017-08-31 | Stop reason: HOSPADM

## 2017-08-26 RX ADMIN — FLUOXETINE 20 MG: 20 CAPSULE ORAL at 08:29

## 2017-08-26 RX ADMIN — SODIUM CHLORIDE 100 ML/HR: 900 INJECTION, SOLUTION INTRAVENOUS at 04:45

## 2017-08-26 RX ADMIN — Medication 400 MG: at 10:37

## 2017-08-26 RX ADMIN — ENOXAPARIN SODIUM 40 MG: 40 INJECTION SUBCUTANEOUS at 08:28

## 2017-08-26 RX ADMIN — CLORAZEPATE DIPOTASSIUM 3.75 MG: 7.5 TABLET ORAL at 08:29

## 2017-08-26 RX ADMIN — PRAVASTATIN SODIUM 40 MG: 20 TABLET ORAL at 21:27

## 2017-08-26 RX ADMIN — SODIUM CHLORIDE 100 ML/HR: 900 INJECTION, SOLUTION INTRAVENOUS at 15:52

## 2017-08-26 RX ADMIN — PREGABALIN 100 MG: 50 CAPSULE ORAL at 08:28

## 2017-08-26 RX ADMIN — PREGABALIN 100 MG: 50 CAPSULE ORAL at 21:27

## 2017-08-26 RX ADMIN — CYANOCOBALAMIN TAB 1000 MCG 1000 MCG: 1000 TAB at 08:29

## 2017-08-26 RX ADMIN — Medication 400 MG: at 17:11

## 2017-08-26 RX ADMIN — PANTOPRAZOLE SODIUM 40 MG: 40 TABLET, DELAYED RELEASE ORAL at 08:29

## 2017-08-26 RX ADMIN — CEFTRIAXONE 1 G: 1 INJECTION, POWDER, FOR SOLUTION INTRAMUSCULAR; INTRAVENOUS at 17:11

## 2017-08-26 RX ADMIN — CLOPIDOGREL BISULFATE 75 MG: 75 TABLET ORAL at 08:29

## 2017-08-26 NOTE — PROGRESS NOTES
Hospitalist Progress Note     Admit Date:  2017  1:36 PM   Name:  Zach Taylor   Age:  74 y.o.  :  1945   MRN:  [id number]   PCP:  Cheryle Salas MD  Treatment Team: Attending Provider: Maira Haywood MD; Primary Nurse: Kuldeep Vazquez RN    Subjective:   Up in chair eating breakfast. No distress. Poor historian. No complaints. No fever or chills      Objective:   Patient Vitals for the past 24 hrs:   Temp Pulse Resp BP SpO2   17 0624 96.4 °F (35.8 °C) 81 18 145/90 98 %   17 0320 97.2 °F (36.2 °C) 82 18 122/79 97 %   17 2258 98.3 °F (36.8 °C) 77 18 137/70 98 %   17 1653 99.6 °F (37.6 °C) 76 16 122/60 99 %   17 1555 - 70 19 137/73 96 %   17 1535 - 77 15 142/64 98 %   17 1515 - 70 12 135/69 97 %   17 1440 - 72 (!) 34 153/69 98 %   17 1425 - 77 12 156/68 96 %   17 1344 98.4 °F (36.9 °C) 68 14 144/61 98 %     Oxygen Therapy  O2 Sat (%): 98 % (17 0624)  Pulse via Oximetry: 70 beats per minute (17 1555)  O2 Device: Room air (17 1344)    Intake/Output Summary (Last 24 hours) at 17 0922  Last data filed at 17 0457   Gross per 24 hour   Intake             1142 ml   Output                0 ml   Net             1142 ml         General:    Well nourished. Alert. CV:   RRR. No murmur, rub, or gallop. Lungs:   Clear to auscultation bilaterally. No wheezing, rhonchi, or rales. Abdomen:   Soft, nontender, nondistended. Bowel sounds normal.   Extremities: Warm and dry. No cyanosis or edema. Skin:     No rashes or jaundice.      Current Meds:  Current Facility-Administered Medications   Medication Dose Route Frequency    clopidogrel (PLAVIX) tablet 75 mg  75 mg Oral DAILY    clorazepate (TRANXENE) tablet 3.75 mg  3.75 mg Oral DAILY    colestipol (COLESTID) tablet 1 g  1 g Oral BID    cyanocobalamin tablet 1,000 mcg  1,000 mcg Oral DAILY    [START ON 2017] dexamethasone (DECADRON) tablet 4 mg  4 mg Oral every Monday    pantoprazole (PROTONIX) tablet 40 mg  40 mg Oral ACB    FLUoxetine (PROzac) capsule 20 mg  20 mg Oral DAILY    pregabalin (LYRICA) capsule 100 mg  100 mg Oral BID    pravastatin (PRAVACHOL) tablet 40 mg  40 mg Oral QHS    sodium chloride (NS) flush 5-10 mL  5-10 mL IntraVENous Q8H    sodium chloride (NS) flush 5-10 mL  5-10 mL IntraVENous PRN    acetaminophen (TYLENOL) tablet 650 mg  650 mg Oral Q6H PRN    ondansetron (ZOFRAN) injection 4 mg  4 mg IntraVENous Q4H PRN    enoxaparin (LOVENOX) injection 40 mg  40 mg SubCUTAneous Q24H    cefTRIAXone (ROCEPHIN) 1 g in 0.9% sodium chloride (MBP/ADV) 50 mL  1 g IntraVENous Q24H    0.9% sodium chloride infusion  100 mL/hr IntraVENous CONTINUOUS       Labs and Studies:  I have reviewed all labs, meds, telemetry events, and studies from the last 24 hours. Recent Results (from the past 24 hour(s))   CBC WITH AUTOMATED DIFF    Collection Time: 08/25/17  1:44 PM   Result Value Ref Range    WBC 6.4 4.3 - 11.1 K/uL    RBC 3.11 (L) 4.05 - 5.25 M/uL    HGB 10.9 (L) 11.7 - 15.4 g/dL    HCT 31.5 (L) 35.8 - 46.3 %    .3 (H) 79.6 - 97.8 FL    MCH 35.0 (H) 26.1 - 32.9 PG    MCHC 34.6 31.4 - 35.0 g/dL    RDW 14.3 11.9 - 14.6 %    PLATELET 419 (L) 971 - 450 K/uL    MPV 11.9 10.8 - 14.1 FL    DF AUTOMATED      NEUTROPHILS 63 43 - 78 %    LYMPHOCYTES 21 13 - 44 %    MONOCYTES 16 (H) 4.0 - 12.0 %    EOSINOPHILS 0 (L) 0.5 - 7.8 %    BASOPHILS 0 0.0 - 2.0 %    IMMATURE GRANULOCYTES 0.3 0.0 - 5.0 %    ABS. NEUTROPHILS 4.0 1.7 - 8.2 K/UL    ABS. LYMPHOCYTES 1.3 0.5 - 4.6 K/UL    ABS. MONOCYTES 1.0 0.1 - 1.3 K/UL    ABS. EOSINOPHILS 0.0 0.0 - 0.8 K/UL    ABS. BASOPHILS 0.0 0.0 - 0.2 K/UL    ABS. IMM.  GRANS. 0.0 0.0 - 0.5 K/UL   METABOLIC PANEL, COMPREHENSIVE    Collection Time: 08/25/17  1:44 PM   Result Value Ref Range    Sodium 135 (L) 136 - 145 mmol/L    Potassium 2.8 (LL) 3.5 - 5.1 mmol/L    Chloride 98 98 - 107 mmol/L    CO2 31 21 - 32 mmol/L    Anion gap 6 (L) 7 - 16 mmol/L    Glucose 100 65 - 100 mg/dL    BUN 11 8 - 23 MG/DL    Creatinine 0.71 0.6 - 1.0 MG/DL    GFR est AA >60 >60 ml/min/1.73m2    GFR est non-AA >60 >60 ml/min/1.73m2    Calcium 8.3 8.3 - 10.4 MG/DL    Bilirubin, total 0.5 0.2 - 1.1 MG/DL    ALT (SGPT) 59 12 - 65 U/L    AST (SGOT) 20 15 - 37 U/L    Alk.  phosphatase 74 50 - 136 U/L    Protein, total 6.2 (L) 6.3 - 8.2 g/dL    Albumin 2.6 (L) 3.2 - 4.6 g/dL    Globulin 3.6 (H) 2.3 - 3.5 g/dL    A-G Ratio 0.7 (L) 1.2 - 3.5     TROPONIN I    Collection Time: 08/25/17  1:44 PM   Result Value Ref Range    Troponin-I, Qt. <0.04 0.02 - 0.05 NG/ML   MAGNESIUM    Collection Time: 08/25/17  1:44 PM   Result Value Ref Range    Magnesium 1.7 (L) 1.8 - 2.4 mg/dL   EKG, 12 LEAD, INITIAL    Collection Time: 08/25/17  2:26 PM   Result Value Ref Range    Ventricular Rate 73 BPM    Atrial Rate 73 BPM    P-R Interval 156 ms    QRS Duration 100 ms    Q-T Interval 422 ms    QTC Calculation (Bezet) 464 ms    Calculated P Axis 56 degrees    Calculated R Axis -4 degrees    Calculated T Axis 32 degrees    Diagnosis       Sinus rhythm with occasional Premature ventricular complexes  Voltage criteria for left ventricular hypertrophy  Abnormal ECG  When compared with ECG of 08-JUL-2017 14:05,  Premature ventricular complexes are now Present  Borderline criteria for Anterior infarct are no longer Present     URINE MICROSCOPIC    Collection Time: 08/25/17  2:41 PM   Result Value Ref Range    WBC  0 /hpf    RBC 0-3 0 /hpf    Epithelial cells 0 0 /hpf    Bacteria TRACE 0 /hpf    Casts 0-3 0 /lpf   MRSA SCREEN - PCR (NASAL)    Collection Time: 08/25/17  6:19 PM   Result Value Ref Range    Special Requests: NO SPECIAL REQUESTS      Culture result:        MRSA target DNA is not detected (presumptive not colonized with MRSA)   METABOLIC PANEL, BASIC    Collection Time: 08/26/17  6:15 AM   Result Value Ref Range    Sodium 142 136 - 145 mmol/L    Potassium 3.9 3.5 - 5.1 mmol/L Chloride 101 98 - 107 mmol/L    CO2 28 21 - 32 mmol/L    Anion gap 13 7 - 16 mmol/L    Glucose 83 65 - 100 mg/dL    BUN 9 8 - 23 MG/DL    Creatinine 0.52 (L) 0.6 - 1.0 MG/DL    GFR est AA >60 >60 ml/min/1.73m2    GFR est non-AA >60 >60 ml/min/1.73m2    Calcium 8.4 8.3 - 10.4 MG/DL        All Micro Results     Procedure Component Value Units Date/Time    MRSA SCREEN - PCR (NASAL) [[id number]] Collected:  08/25/17 1819    Order Status:  Completed Specimen:  Nasal from Nares Updated:  08/25/17 2036     Special Requests: NO SPECIAL REQUESTS        Culture result:         MRSA target DNA is not detected (presumptive not colonized with MRSA)    CULTURE, URINE [[id number]] Collected:  08/25/17 1441    Order Status:  Completed Specimen:  Urine from Clean catch Updated:  08/25/17 1940    CULTURE, URINE [[id number]] Collected:  08/25/17 1715    Order Status:  Canceled Specimen:  Urine from Clean catch           Recent Imaging:  CXR Results  (Last 48 hours)               08/25/17 1420  XR CHEST PA LAT Final result    Impression:  IMPRESSION: Thoracic scoliosis, cardiomegaly, clear lung fields. Narrative:  CHEST X-RAY AP AND LATERAL : 8/25/2017   Indication: Weakness, UTI   Comparison: 7/8/2017   Findings: The lung fields are clear. The heart, mediastinum, soft tissues, and   bony structures are remarkable for thoracic scoliosis and cardiomegaly.            CT Results  (Last 48 hours)    None          Assessment and Plan:     Hospital Problems as of 8/26/2017  Date Reviewed: 7/31/2017          Codes Class Noted - Resolved POA    * (Principal)UTI (urinary tract infection) ICD-10-CM: N39.0  ICD-9-CM: 599.0  8/25/2017 - Present Yes        Cerebrovascular accident (CVA) ([facility] [address] ICD-10-CM: I63.9  ICD-9-CM: 434.91  3/1/2017 - Present Yes    Overview Signed 6/16/2017 11:10 AM by Luma Del Valle LPN     Overview:   Etiology unclear without results of tests done    Last Assessment & Plan:   Needs f/u PRN             Hypokalemia ICD-10-CM: E87.6  ICD-9-CM: 276.8  9/30/2016 - Present Yes        Drug-induced polyneuropathy ([address] ICD-10-CM: G62.0  ICD-9-CM: 357.6, E980.5  9/30/2016 - Present Yes        IgG multiple myeloma (H[facility]) ICD-10-CM: C90.00  ICD-9-CM: 203.00  1/20/2016 - Present Yes    Overview Signed 1/20/2016 10:02 AM by Trav Mitchell MD     kappa-prior stem cell transplant-M spike 0.2 12/2015                     PLAN:    · Continue rocephin for UTI  · F/u urine culture  · PT/OT evaluations  · Replacing potassium and magnesium  · lovenox for DVT prophylaxis    DC planning:  Back to memory care when medically stable        Signed:  Shaylee Everett MD

## 2017-08-26 NOTE — PROGRESS NOTES
Problem: Self Care Deficits Care Plan (Adult)  Goal: *Acute Goals and Plan of Care (Insert Text)  1. Patient will perform grooming with supervision. 2. Patient will perform Upper body dressing with CGA assistance  3. Patient will perform lower body dressing with minimal assistance  4. Patient will perform upper and lower body bathing with minimal assistance. 5. Patient will perform toilet transfers with CGA assistance  6. Patient will perform shower transfer with minimal assistance  7. Patient will participate in 30 + minutes of ADL/ therapeutic exercise/therapeutic activity with min rest breaks to increase activity tolerance for self care. 8. Patient will perform ADL functional mobility in room with CGA. Goals to be achieved in 7 days. OCCUPATIONAL THERAPY: Initial Assessment 8/26/2017  OBSERVATION: Hospital Day: 2  [place] / Product Type: Medicare /      NAME/AGE/GENDER: Alla Stubbs is a 74 y.o. female    PRIMARY DIAGNOSIS:  UTI (urinary tract infection) UTI (urinary tract infection) UTI (urinary tract infection)        ICD-10: Treatment Diagnosis:        · Other lack of cordination (R27.8)   Precautions/Allergies:         Review of patient's allergies indicates no known allergies. ASSESSMENT:      Ms. Oswald Barrientos presents with secondary concerns regarding functional mobility and self-care independence due to s/p UTI. Ms. Oswald Barrientos presented with decreased orientation to place, time/date, and self. Therapist facilitated bed mobility, sit<>stand, and functional mobility in room and hallway. Ms. Oswald Barrientos required minimal assistance for transfers and mobility. She often required cuing and prompting during functional mobility tasks and to initiate and complete therapist directed tasks. Ms. Oswald Barrientos was a poor historian and unable to provide details regarding ADL and IADL assistance.  Skilled OT services are indicated and required at this time to continue to assist self-care performance and functional mobility. This section established at most recent assessment   PROBLEM LIST (Impairments causing functional limitations):  1. Decreased Strength  2. Decreased ADL/Functional Activities  3. Decreased Balance  4. Decreased Activity Tolerance  5. Decreased Cognition    INTERVENTIONS PLANNED: (Benefits and precautions of occupational therapy have been discussed with the patient.)  1. Activities of daily living training  2. Therapeutic activity  3. Therapeutic exercise  4. Self-care and functional mobility training      TREATMENT PLAN: Frequency/Duration: Follow patient 1x to address above goals. Rehabilitation Potential For Stated Goals: GOOD      RECOMMENDED REHABILITATION/EQUIPMENT: (at time of discharge pending progress): Continue Skilled Therapy. OCCUPATIONAL PROFILE AND HISTORY:   History of Present Injury/Illness (Reason for Referral):  UTI; gait mobility, and decreased self cares and functional mobility  Past Medical History/Comorbidities:   Ms. Ave Peña  has a past medical history of Anemia; Anxiety; Back ache; Benign paroxysmal positional vertigo; Cancer ([address] Contact dermatitis and other eczema, due to unspecified cause; Disease of esophagus; DVT (deep venous thrombosis) ([address] (); Dyslipidemia; Edema; Esophageal reflux; FHx: malignant neoplasm of gastrointestinal tract; GERD (gastroesophageal reflux disease); Herpes zoster; History of autologous stem cell transplant ([address] (10/2013); HLD (hyperlipidemia); Hypercholesterolemia; Multiple myeloma ([address] (); Neuropathy ([address] Peripheral neuropathy ([address] Primary hypercoagulable state ([address] Rosacea; Rosacea; Shingles; Shingles (); and Stroke [facility]). She also has no past medical history of Chronic kidney disease.   Ms. Ave Peña  has a past surgical history that includes  section; tubal ligation; colonoscopy (); other surgical (3/06); endoscopy (3/08); heent (2014); and cholecystectomy (08/02/2016). Social History/Living Environment:   Home Environment: Other (comment) (memory care; assisted living)  # Steps to Enter: 0  One/Two Story Residence: One story  Living Alone: No  Support Systems: Long term acute care  Patient Expects to be Discharged to[de-identified] Skilled nursing facility  Current DME Used/Available at Home: Nathalie Gutierrez  Prior Level of Function/Work/Activity:  Ms. Missy Webster was a poor historian, however, reports she requires assistance with all ADLs      Number of Personal Factors/Comorbidities that affect the Plan of Care: Brief history (0):  LOW COMPLEXITY   ASSESSMENT OF OCCUPATIONAL PERFORMANCE[de-identified]   Activities of Daily Living:         Assistance required due to cognition and strength  Basic ADLs (From Assessment) Complex ADLs (From Assessment)   Basic ADL  Feeding: Setup  Oral Facial Hygiene/Grooming: Setup  Bathing: Maximum assistance  Upper Body Dressing: Maximum assistance  Lower Body Dressing: Maximum assistance  Toileting: Total assistance Instrumental ADL  Meal Preparation: Total assistance  Homemaking: Total assistance  Medication Management: Total assistance  Financial Management: Total assistance   Grooming/Bathing/Dressing Activities of Daily Living     Cognitive Retraining  Safety/Judgement: Awareness of environment                 Functional Transfers  Toilet Transfer : Moderate assistance  Tub Transfer: Maximum assistance  Shower Transfer: Moderate assistance     Bed/Mat Mobility  Supine to Sit: Maximum assistance  Sit to Stand: Moderate assistance  Bed to Chair: Moderate assistance  Scooting: Maximum assistance          Most Recent Physical Functioning:   Gross Assessment:                  Posture:     Balance:  Sitting: Without support  Standing: Impaired; With support  Standing - Static: Other (comment) (with FWW)  Standing - Dynamic : Fair Bed Mobility:  Supine to Sit: Maximum assistance  Scooting: Maximum assistance  Wheelchair Mobility:     Transfers:  Sit to Stand: Moderate assistance  Bed to Chair: Moderate assistance      Activities of Daily Living:         Assistance required  Basic ADLs (From Assessment) Complex ADLs (From Assessment)   Basic ADL  Feeding: Setup  Oral Facial Hygiene/Grooming: Setup  Bathing: Maximum assistance  Upper Body Dressing: Maximum assistance  Lower Body Dressing: Maximum assistance  Toileting: Total assistance Instrumental ADL  Meal Preparation: Total assistance  Homemaking: Total assistance  Medication Management: Total assistance  Financial Management: Total assistance   Grooming/Bathing/Dressing Activities of Daily Living     Cognitive Retraining  Safety/Judgement: Awareness of environment                 Functional Transfers  Toilet Transfer : Moderate assistance  Tub Transfer: Maximum assistance  Shower Transfer: Moderate assistance     Bed/Mat Mobility  Supine to Sit: Maximum assistance  Sit to Stand: Moderate assistance  Bed to Chair: Moderate assistance  Scooting: Maximum assistance                 Patient Vitals for the past 6 hrs:       BP BP Patient Position SpO2 Pulse   17 0624 145/90 At rest 98 % 81        Mental Status  Neurologic State: Alert  Orientation Level: Oriented X4  Cognition: Decreased command following  Perception: Appears intact  Perseveration: No perseveration noted  Safety/Judgement: Awareness of environment                               Physical Skills Involved:  1. Balance  2. Strength  3. Activity Tolerance Cognitive Skills Affected (resulting in the inability to perform in a timely and safe manner):  1. Executive Function  2. Sustained Attention Psychosocial Skills Affected:  1. Habits/Routines  2. Self-Awareness   Number of elements that affect the Plan of Care: 5+:  HIGH COMPLEXITY   CLINICAL DECISION MAKIN [facility] [address] [address] AM-PAC 6 Clicks   Daily Activity Inpatient Short Form  How much help from another person does the patient currently need. .. Total A Lot A Little None   1.   Putting on and taking off regular lower body clothing?   [ ] 1   [ ] 2   [X] 3   [ ] 4   2. Bathing (including washing, rinsing, drying)? [ ] 1   [X] 2   [ ] 3   [ ] 4   3. Toileting, which includes using toilet, bedpan or urinal?   [ ] 1   [X] 2   [ ] 3   [ ] 4   4. Putting on and taking off regular upper body clothing?   [ ] 1   [ ] 2   [X] 3   [ ] 4   5. Taking care of personal grooming such as brushing teeth? [ ] 1   [ ] 2   [ ] 3   [X] 4   6. Eating meals? [ ] 1   [ ] 2   [ ] 3   [X] 4   © 2007, Trustees of [address] [place] [address] [address] [id number], under license to [employer]. All rights reserved    Score:  Initial: 18 Most Recent: X (Date: -- )     Interpretation of Tool:  Represents activities that are increasingly more difficult (i.e. Bed mobility, Transfers, Gait). Score 24 23 22-20 19-15 14-10 9-7 6       Modifier CH CI CJ CK CL CM CN         · Self Care:               - CURRENT STATUS:    CK - 40%-59% impaired, limited or restricted               - GOAL STATUS:           CJ - 20%-39% impaired, limited or restricted               - D/C STATUS:                       ---------------To be determined---------------  [place] / Product Type: Medicare /       Medical Necessity:     · Patient is expected to demonstrate progress in balance, coordination and functional technique to decrease assistance required with self care and functional mobility and improve safety during self care and functional mobility. Reason for Services/Other Comments:  · Patient continues to require skilled intervention due to decreased self care and functional mobility.    Use of outcome tool(s) and clinical judgement create a POC that gives a: LOW COMPLEXITY             TREATMENT:   (In addition to Assessment/Re-Assessment sessions the following treatments were rendered)      Pre-treatment Symptoms/Complaints:  Cooperative, no pain reported  Pain: Initial:     0/10 Post Session:  0/10 Assessment/Reassessment only, no treatment provided today     Braces/Orthotics/Lines/Etc:   · IV  · O2 Device: Room air  Treatment/Session Assessment:    · Response to Treatment:  Responded well, tolerated therapy  · Interdisciplinary Collaboration:  · Physical Therapist  · Registered Nurse  · After treatment position/precautions:  · Up in chair  · Bed/Chair-wheels locked  · Call light within reach  · Compliance with Program/Exercises: compliant all of the time. · Recommendations/Intent for next treatment session: \"Next visit will focus on self care and functional mobility\".   Total Treatment Duration: 15 minutes  OT Patient Time In/Time Out  Time In: 0800  Time Out: 0815     Rudi Urias OT

## 2017-08-26 NOTE — PROGRESS NOTES
Speech language pathology: bedside swallow note: Initial Assessment and Discharge   OBSERVATION PATIENT    NAME/AGE/GENDER: Kathy Fleming is a 74 y.o. female  DATE: 8/26/2017  PRIMARY DIAGNOSIS: UTI (urinary tract infection)       ICD-10: Treatment Diagnosis: Oropharyngeal dysphagia (R13.12)  INTERDISCIPLINARY COLLABORATION: Registered Nurse  PRECAUTIONS/ALLERGIES: Review of patient's allergies indicates no known allergies. ASSESSMENT:Based on the objective data described below, Ms. Lashay Scherer presents with positive cough with thin liquids by straw. Otherwise, no overt signs or symptoms of aspiration with thin liquids by cup (single sip or serial swallow), puree, pudding, mixed consistency or cracker. Patient had just finished breakfast of eggs, larios and fruit when SLP arrived. She did not eat the grits. Paul La, reports patient had no difficulty swallowing meds whole with water. Recommend continue regular texture diet and thin liquids with no straws. Meds whole in water as tolerated. Patient should be fully upright for all PO. No further skilled speech/swallow intervention indicated at this time. PLAN OF CARE:   Patient will benefit from skilled intervention to address the following impairments. RECOMMENDATIONS AND PLANNED INTERVENTIONS (Benefits and precautions of therapy have been discussed with the patient.):  · continue prescribed diet  · No straws  MEDICATIONS:  · With liquid  COMPENSATORY STRATEGIES/MODIFICATIONS INCLUDING:  · Upright for all PO  · NO straws  RECOMMENDED REHABILITATION/EQUIPMENT: (at time of discharge pending progress):   None. SUBJECTIVE:   \"You all give such exceptional care. \"  History of Present Injury/Illness: Ms. Lashay Scherer  has a past medical history of Anemia; Anxiety; Back ache; Benign paroxysmal positional vertigo; Cancer ([address] Contact dermatitis and other eczema, due to unspecified cause; Disease of esophagus; DVT (deep venous thrombosis) ([facility] (2002);  Dyslipidemia; Edema; Esophageal reflux; FHx: malignant neoplasm of gastrointestinal tract; GERD (gastroesophageal reflux disease); Herpes zoster; [facility] of [facility] ([facility]); HLD (hyperlipidemia); Hypercholesterolemia; Multiple myeloma ([facility] (); Neuropathy ([facility] Peripheral neuropathy ([facility] Primary hypercoagulable state ([facility] Rosacea; Rosacea; Shingles; Shingles (); and Stroke [facility]). She also has no past medical history of Chronic kidney disease. She also  has a past surgical history that includes  section; tubal ligation; colonoscopy (); other surgical (3/06); endoscopy (3/08); heent (2014); and cholecystectomy (2016). Present Symptoms: altered mental status   Pain Intensity 1: 0  Current Medications:   No current facility-administered medications on file prior to encounter. Current Outpatient Prescriptions on File Prior to Encounter   Medication Sig Dispense Refill    ciprofloxacin HCl (CIPRO) 250 mg tablet Take 1 Tab by mouth two (2) times a day. 10 Tab 0    FLUoxetine (PROZAC) 20 mg tablet Take 1 Tab by mouth daily. 90 Tab 4    clopidogrel (PLAVIX) 75 mg tab Take 1 Tab by mouth daily. 30 Tab 12    colestipol (COLESTID) 1 gram tablet TAKE 1 TABLET BY MOUTH TWICE DAILY. 60 Tab 12    clorazepate (TRANXENE) 3.75 mg tablet Take 1 Tab by mouth daily. Max Daily Amount: 3.75 mg. 30 Tab 5    potassium chloride (KLOR-CON) 10 mEq tablet Take 1 Tab by mouth three (3) times daily. 90 Tab 5    pravastatin (PRAVACHOL) 40 mg tablet Take 1 Tab by mouth nightly. 30 Tab 12    pyridoxine, vitamin B6, (VITAMIN B-6) 100 mg tablet Take 1 Tab by mouth two (2) times a day. 60 Tab 12    B.infantis-B.ani-B.long-B.bifi (PROBIOTIC 4X) 10-15 mg TbEC Take 1 Tab by mouth daily. 30 Tab 12    esomeprazole (NEXIUM) 40 mg capsule Take 1 Cap by mouth daily. 30 Cap 12    LYRICA 100 mg capsule Take 1 Cap by mouth two (2) times a day.  Max Daily Amount: 200 mg. 60 Cap 12    dexamethasone (DECADRON) 4 mg tablet Take 1 Tab by mouth every Monday. 4 Tab 14    lenalidomide (REVLIMID) 15 mg cap Take 1 Cap by mouth every twenty-one (21) days. Indications: MULTIPLE MYELOMA 2 Cap 12    multivitamins-minerals-lutein (CENTRUM SILVER) tab tablet Take 1 Tab by mouth daily. 30 Tab 12    cyanocobalamin (VITAMIN B-12) 1,000 mcg tablet Take 1,000 mcg by mouth daily. Current Dietary Status:  Regular textures, thin liquids      Social History/Home Situation:    Home Environment: Other (comment)  # Steps to Enter: 0  One/Two Story Residence: One story  Living Alone: No  Support Systems: Long term acute care  Patient Expects to be Discharged to[de-identified] Other (comment)  Current DME Used/Available at Home: None  OBJECTIVE:   Respiratory Status: Room air       CXR Results:IMPRESSION: Thoracic scoliosis, cardiomegaly, clear lung fields. MRI/CT Results:IMPRESSION:  Negative for acute intracranial abnormality. Chronic changes. Cognitive and Communication Status:  Neurologic State: Alert  Orientation Level: Oriented X4  Cognition: Decreased command following  Perception: Appears intact  Perseveration: No perseveration noted  Safety/Judgement: Awareness of environment    BEDSIDE SWALLOW EVALUATION  Oral Assessment:  Oral Assessment  Labial: Impaired coordination  Dentition: Natural  Oral Hygiene: adequate  Lingual: Incoordinated  Velum: No impairment  P.O. Trials:  Patient Position: upright in chair    The patient was given the following:   Consistency Presented: Thin liquid; Solid;Puree;Pudding;Mixed consistency  How Presented: Self-fed/presented;SLP-fed/presented;Cup/sip;Cup/gulp; Spoon;Straw;Successive swallows    ORAL PHASE:  Bolus Acceptance: No impairment  Bolus Formation/Control: No impairment  Propulsion: No impairment     Oral Residue: None    PHARYNGEAL PHASE:  Initiation of Swallow: No impairment  Laryngeal Elevation: Functional  Aspiration Signs/Symptoms: Strong cough (with thin liquids via straw only)  Vocal Quality: No impairment           Pharyngeal Phase Characteristics: No impairment, issues, or problems     OTHER OBSERVATIONS:  Rate/bite size: WNL   Endurance: WNL      Tool Used: Dysphagia Outcome and Severity Scale (MIHIR)    Score Comments   Normal Diet  [] 7 With no strategies or extra time needed   Functional Swallow  [x] 6 May have mild oral or pharyngeal delay       Mild Dysphagia    [] 5 Which may require one diet consistency restricted (those who demonstrate penetration which is entirely cleared on MBS would be included)   Mild-Moderate Dysphagia  [] 4 With 1-2 diet consistencies restricted       Moderate Dysphagia  [] 3 With 2 or more diet consistencies restricted       Moderately Severe Dysphagia  [] 2 With partial PO strategies (trials with ST only)       Severe Dysphagia  [] 1 With inability to tolerate any PO safely          Score:  Initial: 6 Most Recent: X (Date: -- )   Interpretation of Tool: The Dysphagia Outcome and Severity Scale (MIHIR) is a simple, easy-to-use, 7-point scale developed to systematically rate the functional severity of dysphagia based on objective assessment and make recommendations for diet level, independence level, and type of nutrition. Score 7 6 5 4 3 2 1   Modifier CH CI CJ CK CL CM CN   ?  Swallowing:     - CURRENT STATUS: CI - 1%-19% impaired, limited or restricted    - GOAL STATUS:  CI - 1%-19% impaired, limited or restricted    - D/C STATUS:  CI - 1%-19% impaired, limited or restricted  [place] / Product Type: Medicare /     TREATMENT:    (In addition to Assessment/Re-Assessment sessions the following treatments were rendered)  Assessment/Reassessment only, no treatment provided today  __________________________________________________________________________________________  Safety:   After treatment position/precautions:  · Up in chair  · RN notified  Treatment Assessment:  No further skilled speech/swallow intervention indicated.     Total Treatment Duration:  Time In: 0900  Time Out: [address], [place], CCC-SLP

## 2017-08-26 NOTE — PROGRESS NOTES
Shift assessment complete. Respirations present. Even and unlabored. No s/s of distress. Zero c/o pain at this time. Call light within reach. Encouraged patient to call for assistance. Patient verbalizes understanding. See Doc Flowsheet for assessment details. Patient resting in bed. Bed alarm in progress. Saline well intact to right wrist with normal saline infusing at 100cc per hour. Incontinent of bowel and bladder with brief in place. Room air noted. Alert and oriented times one.

## 2017-08-26 NOTE — PROGRESS NOTES
Problem: Mobility Impaired (Adult and Pediatric)  Goal: *Acute Goals and Plan of Care (Insert Text)  STG:  (1.)Ms. Luis Frazier will move from supine to sit and sit to supine with MOD ASSIST within 3 day(s). (2.)Ms. Luis Frazier will transfer from bed to chair and chair to bed with MINIMAL ASSIST using the least restrictive device within 3 day(s). LTG:  (1.)Ms. Luis Frzaier will move from supine to sit and sit to supine in bed with MINIMAL ASSIST within 7 day(s). (2.)Ms. Luis Frazier will transfer from bed to chair and chair to bed with CONTACT GUARD ASSIST using the least restrictive device within 7 day(s). (3.)Ms. Luis Frazier will ambulate with CONTACT GUARD ASSIST for 150 feet with the least restrictive device within 7 day(s). ________________________________________________________________________________________________      PHYSICAL THERAPY: INITIAL ASSESSMENT, TREATMENT DAY: 1ST 8/26/2017  OBSERVATION: Hospital Day: 2  [place] / Product Type: Medicare /      NAME/AGE/GENDER: Collin King is a 74 y.o. female    PRIMARY DIAGNOSIS: UTI (urinary tract infection) UTI (urinary tract infection) UTI (urinary tract infection)        ICD-10: Treatment Diagnosis:       · Generalized Muscle Weakness (M62.81)  · Difficulty in walking, Not elsewhere classified (R26.2)   Precaution/Allergies:  Review of patient's allergies indicates no known allergies. ASSESSMENT:      Ms. Luis Frazier presents with decreased strength, balance, and functional mobility s/p recent UTI. She is A&Ox1. Her dementia and poor motor planning significantly affected level of assist needed for all mobility today. She will benefit from skilled PT to work towards improving above deficits while in acute. This section established at most recent assessment   PROBLEM LIST (Impairments causing functional limitations):  1. Decreased Strength  2. Decreased Transfer Abilities  3.  Decreased Ambulation Ability/Technique  4. Decreased Balance  5. Decreased Activity Tolerance  6. Decreased Cognition    INTERVENTIONS PLANNED: (Benefits and precautions of physical therapy have been discussed with the patient.)  1. Balance Exercise  2. Bed Mobility  3. Family Education  4. Gait Training  5. Home Exercise Program (HEP)  6. Manual Therapy  7. Neuromuscular Re-education/Strengthening  8. Therapeutic Activites  9. Therapeutic Exercise/Strengthening  10. Group Therapy      TREATMENT PLAN: Frequency/Duration: daily for duration of hospital stay  Rehabilitation Potential For Stated Goals: GOOD      RECOMMENDED REHABILITATION/EQUIPMENT: (at time of discharge pending progress): Dependent on PLOF. Unable to determine from pt who is poor historian. Family not present. Gianna [place] HISTORY:   History of Present Injury/Illness (Reason for Referral):  Deconditioning secondary to hospitalization  Taken from H&P:Ms. Ankit Garcia is a 73 yo female with PMH of multiple myeloma, CVA, memory loss, recurrent UTI evaluated for progressive weakness and concern for recurrent UTI. Presently lives at [address] [place] [address]. Follows with [facility] neurology and had recent memory testing. Past Medical History/Comorbidities:   Ms. Ankit Garcia  has a past medical history of Anemia; Anxiety; Back ache; Benign paroxysmal positional vertigo; Cancer ([address] Contact dermatitis and other eczema, due to unspecified cause; Disease of esophagus; DVT (deep venous thrombosis) ([address] (2002); Dyslipidemia; Edema; Esophageal reflux; FHx: malignant neoplasm of gastrointestinal tract; GERD (gastroesophageal reflux disease); Herpes zoster; History of autologous stem cell transplant ([address] (10/2013); HLD (hyperlipidemia); Hypercholesterolemia; Multiple myeloma ([address] (1990); Neuropathy ([address] Peripheral neuropathy ([address] Primary hypercoagulable state ([address] Rosacea; Rosacea; Shingles; Shingles (2008); and Stroke [facility]).  She also has no past medical history of Chronic kidney disease. Ms. Artie St  has a past surgical history that includes  section; tubal ligation; colonoscopy (); other surgical (3/06); endoscopy (3/08); heent (2014); and cholecystectomy (2016). Social History/Living Environment:   Home Environment: Other (comment)  # Steps to Enter: 0  One/Two Story Residence: One story  Living Alone: No  Support Systems: Long term acute care  Patient Expects to be Discharged to[de-identified] Other (comment)  Current DME Used/Available at Home: None  Prior Level of Function/Work/Activity:  Unable to determine. Pt poor historian. Family not present  Personal Factors:          Sex:  female        Age:  74 y.o. Number of Personal Factors/Comorbidities that affect the Plan of Care: 1-2: MODERATE COMPLEXITY   EXAMINATION:   Most Recent Physical Functioning:   Gross Assessment:                  Posture:     Balance:  Sitting: Without support  Standing: Impaired; With support  Standing - Static: Other (comment) (with FWW)  Standing - Dynamic : Fair Bed Mobility:  Supine to Sit: Maximum assistance  Scooting: Maximum assistance  Wheelchair Mobility:     Transfers:  Sit to Stand: Moderate assistance  Bed to Chair: Moderate assistance  Gait:     Base of Support: Widened  Gait Abnormalities: Shuffling gait  Distance (ft):  (50)  Assistive Device: Walker, rolling  Ambulation - Level of Assistance: Moderate assistance   Observation/Orthostatic Postural Assessment:          Stooped posture in standing. Shuffled gt. Stands and walks behind walker and will not follow instructions to stand/walk inside walker for safety and posture improvement  Strength:          Grossly 3/5 throughout. Partially limited d/t lack of following directions  Mental Status:          A&O X1   Body Structures Involved:  1. Nerves  2. Joints  3. Muscles  4. Ligaments Body Functions Affected:  1. Mental  2. Sensory/Pain  3. Neuromusculoskeletal  4. Movement Related Activities and Participation Affected:  1.  Learning and Applying Knowledge  2. General Tasks and Demands  3. Mobility  4. Self Care  5. Domestic Life   Number of elements that affect the Plan of Care: 1-2: LOW COMPLEXITY   CLINICAL PRESENTATION:   Presentation: Stable and uncomplicated: LOW COMPLEXITY   CLINICAL DECISION MAKIN [facility] [address] [id number] AM-PAC 6 Clicks   Basic Mobility Inpatient Short Form  How much difficulty does the patient currently have. .. Unable A Lot A Little None   1. Turning over in bed (including adjusting bedclothes, sheets and blankets)? [ ] 1   [X] 2   [ ] 3   [ ] 4   2. Sitting down on and standing up from a chair with arms ( e.g., wheelchair, bedside commode, etc.)   [ ] 1   [X] 2   [ ] 3   [ ] 4   3. Moving from lying on back to sitting on the side of the bed? [ ] 1   [X] 2   [ ] 3   [ ] 4   How much help from another person does the patient currently need. .. Total A Lot A Little None   4. Moving to and from a bed to a chair (including a wheelchair)? [ ] 1   [X] 2   [ ] 3   [ ] 4   5. Need to walk in hospital room? [ ] 1   [X] 2   [ ] 3   [ ] 4   6. Climbing 3-5 steps with a railing? [X] 1   [ ] 2   [ ] 3   [ ] 4   © , Trustees of [facility] [address] [address], under license to [employer]. All rights reserved    Score:  Initial: 11 Most Recent: X (Date: -- )     Interpretation of Tool:  Represents activities that are increasingly more difficult (i.e. Bed mobility, Transfers, Gait).        Score 24 23 22-20 19-15 14-10 9-7 6       Modifier CH CI CJ CK CL CM CN         · Mobility - Walking and Moving Around:               - CURRENT STATUS:    CL - 60%-79% impaired, limited or restricted               - GOAL STATUS:           CK - 40%-59% impaired, limited or restricted               - D/C STATUS:                       ---------------To be determined---------------  [place] / Product Type: Medicare /       Medical Necessity:     · Skilled intervention continues to be required due to current poor functional mobility and likely worse than PLOF. Reason for Services/Other Comments:  · Patient continues to require skilled intervention due to likely higher PLOF living in assisted living memory care. Use of outcome tool(s) and clinical judgement create a POC that gives a: Clear prediction of patient's progress: LOW COMPLEXITY                 TREATMENT:   (In addition to Assessment/Re-Assessment sessions the following treatments were rendered)   Pre-treatment Symptoms/Complaints:  Pt pleasant agreeable to treatment  Pain: Initial:   Pain Intensity 1: 0  Post Session:  0      Gait Training (  6 min):  Gait training to improve and/or restore physical functioning as related to mobility, strength and balance. Ambulated  (50) with Moderate assistance using a Walker, rolling and moderate   related to their stance phase, stride length and pelvis position and motion to promote proper body alignment. Instruction in performance of posture training and taking larger steps inside FWW to correct shuffled gait and stooped posture. Braces/Orthotics/Lines/Etc:   · IV  · O2 Device: Room air  Treatment/Session Assessment:    · Response to Treatment:  good  · Interdisciplinary Collaboration:  · Occupational Therapist  · After treatment position/precautions:  · Up in chair  · Call light within reach  · Nurse at bedside  · Compliance with Program/Exercises: Will assess as treatment progresses. · Recommendations/Intent for next treatment session: \"Next visit will focus on advancements to more challenging activities\".   Total Treatment Duration:  PT Patient Time In/Time Out  Time In: 0815  Time Out: [address], PT

## 2017-08-26 NOTE — PROGRESS NOTES
Shift assessment complete. Patient alert to person with confusion on place, situation and time. Breath sounds clear, regular and non-labored. Bowel sounds active in all 4 quadrants. Abdomen soft, and non-distended. Denies pain at this time. Bed in low position, wheels locked in place, call light within reach, and gripper socks on. Will monitor.

## 2017-08-27 LAB
ANION GAP SERPL CALC-SCNC: 8 MMOL/L (ref 7–16)
BASOPHILS # BLD: 0 K/UL (ref 0–0.2)
BASOPHILS NFR BLD: 1 % (ref 0–2)
BUN SERPL-MCNC: 9 MG/DL (ref 8–23)
CALCIUM SERPL-MCNC: 7.7 MG/DL (ref 8.3–10.4)
CHLORIDE SERPL-SCNC: 103 MMOL/L (ref 98–107)
CO2 SERPL-SCNC: 28 MMOL/L (ref 21–32)
CREAT SERPL-MCNC: 0.48 MG/DL (ref 0.6–1)
DIFFERENTIAL METHOD BLD: ABNORMAL
EOSINOPHIL # BLD: 0.1 K/UL (ref 0–0.8)
EOSINOPHIL NFR BLD: 1 % (ref 0.5–7.8)
ERYTHROCYTE [DISTWIDTH] IN BLOOD BY AUTOMATED COUNT: 14.7 % (ref 11.9–14.6)
GLUCOSE SERPL-MCNC: 93 MG/DL (ref 65–100)
HCT VFR BLD AUTO: 30.2 % (ref 35.8–46.3)
HGB BLD-MCNC: 10.3 G/DL (ref 11.7–15.4)
IMM GRANULOCYTES # BLD: 0 K/UL (ref 0–0.5)
IMM GRANULOCYTES NFR BLD: 0.6 % (ref 0–5)
LYMPHOCYTES # BLD: 1.2 K/UL (ref 0.5–4.6)
LYMPHOCYTES NFR BLD: 25 % (ref 13–44)
MCH RBC QN AUTO: 34.7 PG (ref 26.1–32.9)
MCHC RBC AUTO-ENTMCNC: 34.1 G/DL (ref 31.4–35)
MCV RBC AUTO: 101.7 FL (ref 79.6–97.8)
MM INDURATION POC: NORMAL MM (ref 0–5)
MM INDURATION POC: NORMAL MM (ref 0–5)
MONOCYTES # BLD: 0.8 K/UL (ref 0.1–1.3)
MONOCYTES NFR BLD: 16 % (ref 4–12)
NEUTS SEG # BLD: 2.7 K/UL (ref 1.7–8.2)
NEUTS SEG NFR BLD: 56 % (ref 43–78)
PLATELET # BLD AUTO: 113 K/UL (ref 150–450)
PMV BLD AUTO: 11.9 FL (ref 10.8–14.1)
POTASSIUM SERPL-SCNC: 3 MMOL/L (ref 3.5–5.1)
PPD POC: NORMAL NEGATIVE
PPD POC: NORMAL NEGATIVE
RBC # BLD AUTO: 2.97 M/UL (ref 4.05–5.25)
SODIUM SERPL-SCNC: 139 MMOL/L (ref 136–145)
WBC # BLD AUTO: 4.3 K/UL (ref 4.3–11.1)

## 2017-08-27 PROCEDURE — 36415 COLL VENOUS BLD VENIPUNCTURE: CPT | Performed by: INTERNAL MEDICINE

## 2017-08-27 PROCEDURE — 74011000258 HC RX REV CODE- 258: Performed by: INTERNAL MEDICINE

## 2017-08-27 PROCEDURE — 97530 THERAPEUTIC ACTIVITIES: CPT

## 2017-08-27 PROCEDURE — 97116 GAIT TRAINING THERAPY: CPT

## 2017-08-27 PROCEDURE — 97110 THERAPEUTIC EXERCISES: CPT

## 2017-08-27 PROCEDURE — 80048 BASIC METABOLIC PNL TOTAL CA: CPT | Performed by: INTERNAL MEDICINE

## 2017-08-27 PROCEDURE — 65270000029 HC RM PRIVATE

## 2017-08-27 PROCEDURE — 85025 COMPLETE CBC W/AUTO DIFF WBC: CPT | Performed by: INTERNAL MEDICINE

## 2017-08-27 PROCEDURE — 99218 HC RM OBSERVATION: CPT

## 2017-08-27 PROCEDURE — 74011250637 HC RX REV CODE- 250/637: Performed by: INTERNAL MEDICINE

## 2017-08-27 PROCEDURE — 74011250636 HC RX REV CODE- 250/636: Performed by: INTERNAL MEDICINE

## 2017-08-27 PROCEDURE — 74011250637 HC RX REV CODE- 250/637: Performed by: HOSPITALIST

## 2017-08-27 RX ADMIN — ENOXAPARIN SODIUM 40 MG: 40 INJECTION SUBCUTANEOUS at 08:15

## 2017-08-27 RX ADMIN — PREGABALIN 100 MG: 50 CAPSULE ORAL at 21:50

## 2017-08-27 RX ADMIN — SODIUM CHLORIDE 100 ML/HR: 900 INJECTION, SOLUTION INTRAVENOUS at 03:59

## 2017-08-27 RX ADMIN — PANTOPRAZOLE SODIUM 40 MG: 40 TABLET, DELAYED RELEASE ORAL at 08:16

## 2017-08-27 RX ADMIN — PREGABALIN 100 MG: 50 CAPSULE ORAL at 08:16

## 2017-08-27 RX ADMIN — CLOPIDOGREL BISULFATE 75 MG: 75 TABLET ORAL at 08:16

## 2017-08-27 RX ADMIN — PRAVASTATIN SODIUM 40 MG: 20 TABLET ORAL at 21:52

## 2017-08-27 RX ADMIN — CEFTRIAXONE 1 G: 1 INJECTION, POWDER, FOR SOLUTION INTRAMUSCULAR; INTRAVENOUS at 17:49

## 2017-08-27 RX ADMIN — SODIUM CHLORIDE 100 ML/HR: 900 INJECTION, SOLUTION INTRAVENOUS at 14:02

## 2017-08-27 RX ADMIN — CYANOCOBALAMIN TAB 1000 MCG 1000 MCG: 1000 TAB at 08:15

## 2017-08-27 RX ADMIN — FLUOXETINE 20 MG: 20 CAPSULE ORAL at 08:16

## 2017-08-27 RX ADMIN — Medication 400 MG: at 17:50

## 2017-08-27 RX ADMIN — CLORAZEPATE DIPOTASSIUM 3.75 MG: 7.5 TABLET ORAL at 08:16

## 2017-08-27 RX ADMIN — Medication 400 MG: at 08:16

## 2017-08-27 NOTE — PROGRESS NOTES
Shift assessment complete. Patient alert to self. Breath sounds clear, regular, and non-labored. Bowel sounds active in all 4 quadrants. Abdomen soft and non-distended. Denies pain at this time. Bed alarm on, bed in low position, wheels locked in place, call light with in reach, gripper socks on. Will monitor.

## 2017-08-27 NOTE — PROGRESS NOTES
Hospitalist Progress Note     Admit Date:  2017  1:36 PM   Name:  Donna Whipple   Age:  74 y.o.  :  1945   MRN:  [id number]   PCP:  Cullen Tabares MD  Treatment Team: Attending Provider: Costa Choi MD; Primary Nurse: Lamin Avalos RN; Utilization Review: Genia Valentine RN    Subjective:   Feels better. No nausea or vomiting. No fever or chills. Confusion is better. No abdominal pain. Objective:   Patient Vitals for the past 24 hrs:   Temp Pulse Resp BP SpO2   17 0845 96.9 °F (36.1 °C) 76 18 145/76 96 %   17 0335 97.4 °F (36.3 °C) 78 18 179/78 97 %   17 2337 99.2 °F (37.3 °C) 70 20 158/51 99 %   17 1935 97.6 °F (36.4 °C) 76 18 161/71 96 %   17 1519 98.6 °F (37 °C) 66 18 137/65 99 %   17 1235 97.4 °F (36.3 °C) 64 18 126/66 96 %     Oxygen Therapy  O2 Sat (%): 96 % (17 0845)  Pulse via Oximetry: 70 beats per minute (17 1555)  O2 Device: Room air (17 1344)    Intake/Output Summary (Last 24 hours) at 17 1010  Last data filed at 17 0335   Gross per 24 hour   Intake             1977 ml   Output                0 ml   Net             1977 ml         General:    Well nourished. Alert. Poor memory, no distress   CV:   RRR. No murmur, rub, or gallop. Lungs:   Clear to auscultation bilaterally. No wheezing, rhonchi, or rales. Abdomen:   Soft, nontender, nondistended. Bowel sounds normal.   Extremities: Warm and dry. No cyanosis or edema. Skin:     No rashes or jaundice.      Current Meds:  Current Facility-Administered Medications   Medication Dose Route Frequency    magnesium oxide (MAG-OX) tablet 400 mg  400 mg Oral BID    clopidogrel (PLAVIX) tablet 75 mg  75 mg Oral DAILY    clorazepate (TRANXENE) tablet 3.75 mg  3.75 mg Oral DAILY    colestipol (COLESTID) tablet 1 g  1 g Oral BID    cyanocobalamin tablet 1,000 mcg  1,000 mcg Oral DAILY    [START ON 2017] dexamethasone (DECADRON) tablet 4 mg  4 mg Oral every Monday    pantoprazole (PROTONIX) tablet 40 mg  40 mg Oral ACB    FLUoxetine (PROzac) capsule 20 mg  20 mg Oral DAILY    pregabalin (LYRICA) capsule 100 mg  100 mg Oral BID    pravastatin (PRAVACHOL) tablet 40 mg  40 mg Oral QHS    sodium chloride (NS) flush 5-10 mL  5-10 mL IntraVENous Q8H    sodium chloride (NS) flush 5-10 mL  5-10 mL IntraVENous PRN    acetaminophen (TYLENOL) tablet 650 mg  650 mg Oral Q6H PRN    ondansetron (ZOFRAN) injection 4 mg  4 mg IntraVENous Q4H PRN    enoxaparin (LOVENOX) injection 40 mg  40 mg SubCUTAneous Q24H    cefTRIAXone (ROCEPHIN) 1 g in 0.9% sodium chloride (MBP/ADV) 50 mL  1 g IntraVENous Q24H    0.9% sodium chloride infusion  100 mL/hr IntraVENous CONTINUOUS       Labs and Studies:  I have reviewed all labs, meds, telemetry events, and studies from the last 24 hours. Recent Results (from the past 24 hour(s))   METABOLIC PANEL, BASIC    Collection Time: 08/27/17  7:20 AM   Result Value Ref Range    Sodium 139 136 - 145 mmol/L    Potassium 3.0 (L) 3.5 - 5.1 mmol/L    Chloride 103 98 - 107 mmol/L    CO2 28 21 - 32 mmol/L    Anion gap 8 7 - 16 mmol/L    Glucose 93 65 - 100 mg/dL    BUN 9 8 - 23 MG/DL    Creatinine 0.48 (L) 0.6 - 1.0 MG/DL    GFR est AA >60 >60 ml/min/1.73m2    GFR est non-AA >60 >60 ml/min/1.73m2    Calcium 7.7 (L) 8.3 - 10.4 MG/DL   CBC WITH AUTOMATED DIFF    Collection Time: 08/27/17  7:20 AM   Result Value Ref Range    WBC 4.3 4.3 - 11.1 K/uL    RBC 2.97 (L) 4.05 - 5.25 M/uL    HGB 10.3 (L) 11.7 - 15.4 g/dL    HCT 30.2 (L) 35.8 - 46.3 %    .7 (H) 79.6 - 97.8 FL    MCH 34.7 (H) 26.1 - 32.9 PG    MCHC 34.1 31.4 - 35.0 g/dL    RDW 14.7 (H) 11.9 - 14.6 %    PLATELET 273 (L) 933 - 450 K/uL    MPV 11.9 10.8 - 14.1 FL    DF AUTOMATED      NEUTROPHILS 56 43 - 78 %    LYMPHOCYTES 25 13 - 44 %    MONOCYTES 16 (H) 4.0 - 12.0 %    EOSINOPHILS 1 0.5 - 7.8 %    BASOPHILS 1 0.0 - 2.0 %    IMMATURE GRANULOCYTES 0.6 0.0 - 5.0 %    ABS.  NEUTROPHILS 2.7 1.7 - 8.2 K/UL    ABS. LYMPHOCYTES 1.2 0.5 - 4.6 K/UL    ABS. MONOCYTES 0.8 0.1 - 1.3 K/UL    ABS. EOSINOPHILS 0.1 0.0 - 0.8 K/UL    ABS. BASOPHILS 0.0 0.0 - 0.2 K/UL    ABS. IMM. GRANS. 0.0 0.0 - 0.5 K/UL        All Micro Results     Procedure Component Value Units Date/Time    CULTURE, URINE [[id number]]  (Abnormal) Collected:  08/25/17 1441    Order Status:  Completed Specimen:  Urine from Clean catch Updated:  08/27/17 0731     Special Requests: NO SPECIAL REQUESTS        Culture result:         10,000 to 50,000 COLONIES/mL GRAM NEGATIVE RODS SUBCULTURE IN PROGRESS (A)              10,000 to 50,000 COLONIES/mL GRAM POSITIVE COCCI SUBCULTURE IN PROGRESS (A)    MRSA SCREEN - PCR (NASAL) [[id number]] Collected:  08/25/17 1819    Order Status:  Completed Specimen:  Nasal from Nares Updated:  08/25/17 2036     Special Requests: NO SPECIAL REQUESTS        Culture result:         MRSA target DNA is not detected (presumptive not colonized with MRSA)    CULTURE, URINE [[id number]] Collected:  08/25/17 1715    Order Status:  Canceled Specimen:  Urine from Clean catch           Recent Imaging:  CXR Results  (Last 48 hours)               08/25/17 1420  XR CHEST PA LAT Final result    Impression:  IMPRESSION: Thoracic scoliosis, cardiomegaly, clear lung fields. Narrative:  CHEST X-RAY AP AND LATERAL : 8/25/2017   Indication: Weakness, UTI   Comparison: 7/8/2017   Findings: The lung fields are clear. The heart, mediastinum, soft tissues, and   bony structures are remarkable for thoracic scoliosis and cardiomegaly.            CT Results  (Last 48 hours)    None          Assessment and Plan:     Hospital Problems as of 8/27/2017  Date Reviewed: 7/31/2017          Codes Class Noted - Resolved POA    * (Principal)UTI (urinary tract infection) ICD-10-CM: N39.0  ICD-9-CM: 599.0  8/25/2017 - Present Yes        Cerebrovascular accident (CVA) ([facility] [address] ICD-10-CM: I63.9  ICD-9-CM: 434.91  3/1/2017 - Present Yes    Overview Signed 6/16/2017 11:10 AM by Adarsh Coy LPN     Overview:   Etiology unclear without results of tests done    Last Assessment & Plan:   Needs f/u PRN             Hypokalemia ICD-10-CM: E87.6  ICD-9-CM: 276.8  9/30/2016 - Present Yes        Drug-induced polyneuropathy ([facility] ICD-10-CM: G62.0  ICD-9-CM: 357.6, E980.5  9/30/2016 - Present Yes        IgG multiple myeloma ([facility] ICD-10-CM: C90.00  ICD-9-CM: 203.00  1/20/2016 - Present Yes    Overview Signed 1/20/2016 10:02 AM by Alexandre Gonzalez MD     kappa-prior stem cell transplant-M spike 0.2 12/2015                     PLAN:    · Continue IV rocephin and IVF  · Replacing potassium and magnesium  · PT/OT    DC planning:  F/u urine cultures to know what PO antibiotic to place patient on.  Then probably d/c back to memory care on Monday        Signed:  Deirdre Shah MD

## 2017-08-27 NOTE — PROGRESS NOTES
Patient daughter verbalizes concerns about going to rehab with not strong enough antibiotics when discharged. Also concerns about going to rehab and requested to see .

## 2017-08-27 NOTE — PROGRESS NOTES
Problem: Mobility Impaired (Adult and Pediatric)  Goal: *Acute Goals and Plan of Care (Insert Text)  STG:  (1.)Ms. Fatoumata Newsome will move from supine to sit and sit to supine with MOD ASSIST within 3 day(s). (2.)Ms. Fatoumata Newsome will transfer from bed to chair and chair to bed with MINIMAL ASSIST using the least restrictive device within 3 day(s). LTG:  (1.)Ms. Fatoumata Newsome will move from supine to sit and sit to supine in bed with MINIMAL ASSIST within 7 day(s). (2.)Ms. Fatoumata Newsome will transfer from bed to chair and chair to bed with CONTACT GUARD ASSIST using the least restrictive device within 7 day(s). (3.)Ms. Fatoumata Newsome will ambulate with CONTACT GUARD ASSIST for 150 feet with the least restrictive device within 7 day(s). ________________________________________________________________________________________________      PHYSICAL THERAPY: Treatment Day: 2nd 8/27/2017  OBSERVATION: Hospital Day: 3  [place] / Product Type: Medicare /      NAME/AGE/GENDER: Betty Hay is a 74 y.o. female    PRIMARY DIAGNOSIS: UTI (urinary tract infection) UTI (urinary tract infection) UTI (urinary tract infection)        ICD-10: Treatment Diagnosis:       · Generalized Muscle Weakness (M62.81)  · Difficulty in walking, Not elsewhere classified (R26.2)   Precaution/Allergies:  Review of patient's allergies indicates no known allergies. ASSESSMENT:      Ms. Fatoumata Newsome presents with decreased strength, balance, and functional mobility s/p recent UTI. She is A&Ox1. Her dementia and poor motor planning significantly affected level of assist needed for all mobility today. She will benefit from skilled PT to work towards improving above deficits while in acute. This section established at most recent assessment   PROBLEM LIST (Impairments causing functional limitations):  1. Decreased Strength  2. Decreased Transfer Abilities  3. Decreased Ambulation Ability/Technique  4.  Decreased Balance  5. Decreased Activity Tolerance  6. Decreased Cognition    INTERVENTIONS PLANNED: (Benefits and precautions of physical therapy have been discussed with the patient.)  1. Balance Exercise  2. Bed Mobility  3. Family Education  4. Gait Training  5. Home Exercise Program (HEP)  6. Manual Therapy  7. Neuromuscular Re-education/Strengthening  8. Therapeutic Activites  9. Therapeutic Exercise/Strengthening  10. Group Therapy      TREATMENT PLAN: Frequency/Duration: daily for duration of hospital stay  Rehabilitation Potential For Stated Goals: GOOD      RECOMMENDED REHABILITATION/EQUIPMENT: (at time of discharge pending progress): Dependent on PLOF. Unable to determine from pt who is poor historian. Family not present. Mook Jacob HISTORY:   History of Present Injury/Illness (Reason for Referral):  Deconditioning secondary to hospitalization  Taken from H&P:Ms. Shai Cespedes is a 73 yo female with PMH of multiple myeloma, CVA, memory loss, recurrent UTI evaluated for progressive weakness and concern for recurrent UTI. Presently lives at [address] [place] [address]. Follows with [facility] neurology and had recent memory testing. Past Medical History/Comorbidities:   Ms. Shai Cespedes  has a past medical history of Anemia; Anxiety; Back ache; Benign paroxysmal positional vertigo; Cancer ([address] Contact dermatitis and other eczema, due to unspecified cause; Disease of esophagus; DVT (deep venous thrombosis) ([address] (2002); Dyslipidemia; Edema; Esophageal reflux; FHx: malignant neoplasm of gastrointestinal tract; GERD (gastroesophageal reflux disease); Herpes zoster; History of autologous stem cell transplant ([address] (10/2013); HLD (hyperlipidemia); Hypercholesterolemia; Multiple myeloma ([address] (1990); Neuropathy ([address] Peripheral neuropathy ([address] Primary hypercoagulable state ([address] Rosacea; Rosacea; Shingles; Shingles (2008); and Stroke [facility]). She also has no past medical history of Chronic kidney disease.   Ms. Shai Cespedes  has a past surgical history that includes  section; tubal ligation; colonoscopy (); other surgical (3/06); endoscopy (3/08); heent (2014); and cholecystectomy (2016). Social History/Living Environment:   Home Environment: Other (comment) (memory care; assisted living)  # Steps to Enter: 0  One/Two Story Residence: One story  Living Alone: No  Support Systems: Long term acute care  Patient Expects to be Discharged to[de-identified] Skilled nursing facility  Current DME Used/Available at Home: Walker  Prior Level of Function/Work/Activity:  Unable to determine. Pt poor historian. Family not present  Personal Factors:          Sex:  female        Age:  74 y.o. Number of Personal Factors/Comorbidities that affect the Plan of Care: 1-2: MODERATE COMPLEXITY   EXAMINATION:   Most Recent Physical Functioning:   Gross Assessment:                  Posture:     Balance:  Sitting: Impaired  Sitting - Static: Fair (occasional)  Standing: Impaired; With support  Standing - Dynamic : Fair Bed Mobility:  Supine to Sit: Maximum assistance  Scooting: Maximum assistance  Duration:  (20)  Wheelchair Mobility:     Transfers:  Sit to Stand: Moderate assistance  Bed to Chair: Moderate assistance  Gait:     Base of Support: Widened  Gait Abnormalities: Shuffling gait  Distance (ft):  (15)  Assistive Device: Walker, rolling  Ambulation - Level of Assistance: Moderate assistance   Observation/Orthostatic Postural Assessment:          Stooped posture in standing. Shuffled gt. Stands and walks behind walker and will not follow instructions to stand/walk inside walker for safety and posture improvement  Strength:          Grossly 3/5 throughout. Partially limited d/t lack of following directions  Mental Status:          A&O X1   Body Structures Involved:  1. Nerves  2. Joints  3. Muscles  4. Ligaments Body Functions Affected:  1. Mental  2. Sensory/Pain  3. Neuromusculoskeletal  4.  Movement Related Activities and Participation Affected:  1. Learning and Applying Knowledge  2. General Tasks and Demands  3. Mobility  4. Self Care  5. Domestic Life   Number of elements that affect the Plan of Care: 1-2: LOW COMPLEXITY   CLINICAL PRESENTATION:   Presentation: Stable and uncomplicated: LOW COMPLEXITY   CLINICAL DECISION MAKIN [facility] [address] [id number] AM-PAC 6 Clicks   Basic Mobility Inpatient Short Form  How much difficulty does the patient currently have. .. Unable A Lot A Little None   1. Turning over in bed (including adjusting bedclothes, sheets and blankets)? [ ] 1   [X] 2   [ ] 3   [ ] 4   2. Sitting down on and standing up from a chair with arms ( e.g., wheelchair, bedside commode, etc.)   [ ] 1   [X] 2   [ ] 3   [ ] 4   3. Moving from lying on back to sitting on the side of the bed? [ ] 1   [X] 2   [ ] 3   [ ] 4   How much help from another person does the patient currently need. .. Total A Lot A Little None   4. Moving to and from a bed to a chair (including a wheelchair)? [ ] 1   [X] 2   [ ] 3   [ ] 4   5. Need to walk in hospital room? [ ] 1   [X] 2   [ ] 3   [ ] 4   6. Climbing 3-5 steps with a railing? [X] 1   [ ] 2   [ ] 3   [ ] 4   © , Trustees of [address] [place] [address] [address] [address], under license to [employer]. All rights reserved    Score:  Initial: 11 Most Recent: X (Date: -- )     Interpretation of Tool:  Represents activities that are increasingly more difficult (i.e. Bed mobility, Transfers, Gait).        Score 24 23 22-20 19-15 14-10 9-7 6       Modifier CH CI CJ CK CL CM CN         · Mobility - Walking and Moving Around:               - CURRENT STATUS:    CL - 60%-79% impaired, limited or restricted               - GOAL STATUS:           CK - 40%-59% impaired, limited or restricted               - D/C STATUS:                       ---------------To be determined---------------  [place] / Product Type: Medicare /       Medical Necessity:     · Skilled intervention continues to be required due to current poor functional mobility and likely worse than PLOF. Reason for Services/Other Comments:  · Patient continues to require skilled intervention due to likely higher PLOF living in assisted living memory care. Use of outcome tool(s) and clinical judgement create a POC that gives a: Clear prediction of patient's progress: LOW COMPLEXITY                 TREATMENT:   (In addition to Assessment/Re-Assessment sessions the following treatments were rendered)   Pre-treatment Symptoms/Complaints:  Pt found dozing in bed. Pleasant and aggreeable to treatment. Breakfast just arrived. Pain: Initial:   Pain Intensity 1: 0  Post Session:  0      Gait Training (   min):  Gait training to improve and/or restore physical functioning as related to mobility, strength and balance. Ambulated  (15) with Moderate assistance using a Walker, rolling and moderate   related to their stance phase, stride length and pelvis position and motion to promote proper body alignment. Instruction in performance of posture training and taking larger steps inside FWW to correct shuffled gait and stooped posture. THERAPEUTIC EXERCISE: ( 10 minutes):  Exercises per grid below to improve strength and coordination. Required minimal verbal and tactile cues to promote proper body mechanics and promote proper body breathing techniques. Progressed resistance and repetitions as indicated. THERAPEUTIC ACTIVITY: (20')  To improve functional mobility including bed mobility, transfers, standing balance and endurance, safety with mobility, and gait. Pt max assist with bed mobility, mod assist with transfers and gait with verbal and tactile cues needed for safety with mobility. Braces/Orthotics/Lines/Etc:   · IV  · O2 Device: Room air  Treatment/Session Assessment:    · Response to Treatment:  Good/fair. Repeatedly lost balance after sitting unsupported for longer than 10-15 sec.   Continue with poor ability to follow directions leading to impaired safety and fall risk. Difficulty participating with bed mobility and supine to sit transfers  · Interdisciplinary Collaboration:  · none  · After treatment position/precautions:  · Up in chair  · Call light within reach  · Nurse at bedside  · Compliance with Program/Exercises: Will assess as treatment progresses. · Recommendations/Intent for next treatment session: \"Next visit will focus on advancements to more challenging activities\".   Total Treatment Duration:  PT Patient Time In/Time Out  Time In: 0725  Time Out: [address], PT

## 2017-08-27 NOTE — PROGRESS NOTES
Shift assessment complete. Respirations present. Even and unlabored. No s/s of distress. Zero c/o pain at this time. Call light within reach. Encouraged patient to call for assistance. Patient verbalizes understanding. See Doc Flowsheet for assessment details. Patient resting in bed. Bed alarm in progress. Door open. Alert and oriented times one. Incontinent of bowel and bladder. Skin intact. Denies any pain. Room air.

## 2017-08-28 LAB
ANION GAP SERPL CALC-SCNC: 6 MMOL/L (ref 7–16)
BASOPHILS # BLD: 0.1 K/UL (ref 0–0.2)
BASOPHILS NFR BLD: 1 % (ref 0–2)
BUN SERPL-MCNC: 8 MG/DL (ref 8–23)
CALCIUM SERPL-MCNC: 7.3 MG/DL (ref 8.3–10.4)
CHLORIDE SERPL-SCNC: 104 MMOL/L (ref 98–107)
CO2 SERPL-SCNC: 32 MMOL/L (ref 21–32)
CREAT SERPL-MCNC: 0.49 MG/DL (ref 0.6–1)
DIFFERENTIAL METHOD BLD: ABNORMAL
EOSINOPHIL # BLD: 0.1 K/UL (ref 0–0.8)
EOSINOPHIL NFR BLD: 3 % (ref 0.5–7.8)
ERYTHROCYTE [DISTWIDTH] IN BLOOD BY AUTOMATED COUNT: 15.1 % (ref 11.9–14.6)
GLUCOSE SERPL-MCNC: 88 MG/DL (ref 65–100)
HCT VFR BLD AUTO: 30.6 % (ref 35.8–46.3)
HGB BLD-MCNC: 10.2 G/DL (ref 11.7–15.4)
IMM GRANULOCYTES # BLD: 0 K/UL (ref 0–0.5)
IMM GRANULOCYTES NFR BLD: 0.3 % (ref 0–5)
LYMPHOCYTES # BLD: 1 K/UL (ref 0.5–4.6)
LYMPHOCYTES NFR BLD: 28 % (ref 13–44)
MAGNESIUM SERPL-MCNC: 1.7 MG/DL (ref 1.8–2.4)
MCH RBC QN AUTO: 34.6 PG (ref 26.1–32.9)
MCHC RBC AUTO-ENTMCNC: 33.3 G/DL (ref 31.4–35)
MCV RBC AUTO: 103.7 FL (ref 79.6–97.8)
MONOCYTES # BLD: 0.5 K/UL (ref 0.1–1.3)
MONOCYTES NFR BLD: 13 % (ref 4–12)
NEUTS SEG # BLD: 2 K/UL (ref 1.7–8.2)
NEUTS SEG NFR BLD: 55 % (ref 43–78)
PLATELET # BLD AUTO: 114 K/UL (ref 150–450)
PMV BLD AUTO: 12 FL (ref 10.8–14.1)
POTASSIUM SERPL-SCNC: 2.5 MMOL/L (ref 3.5–5.1)
RBC # BLD AUTO: 2.95 M/UL (ref 4.05–5.25)
SODIUM SERPL-SCNC: 142 MMOL/L (ref 136–145)
WBC # BLD AUTO: 3.6 K/UL (ref 4.3–11.1)

## 2017-08-28 PROCEDURE — 80048 BASIC METABOLIC PNL TOTAL CA: CPT | Performed by: INTERNAL MEDICINE

## 2017-08-28 PROCEDURE — 74011250637 HC RX REV CODE- 250/637: Performed by: HOSPITALIST

## 2017-08-28 PROCEDURE — 97530 THERAPEUTIC ACTIVITIES: CPT

## 2017-08-28 PROCEDURE — 74011250636 HC RX REV CODE- 250/636: Performed by: INTERNAL MEDICINE

## 2017-08-28 PROCEDURE — 74011250636 HC RX REV CODE- 250/636: Performed by: HOSPITALIST

## 2017-08-28 PROCEDURE — 65270000029 HC RM PRIVATE

## 2017-08-28 PROCEDURE — 74011250637 HC RX REV CODE- 250/637: Performed by: INTERNAL MEDICINE

## 2017-08-28 PROCEDURE — 97535 SELF CARE MNGMENT TRAINING: CPT

## 2017-08-28 PROCEDURE — 36415 COLL VENOUS BLD VENIPUNCTURE: CPT | Performed by: INTERNAL MEDICINE

## 2017-08-28 PROCEDURE — 74011000258 HC RX REV CODE- 258: Performed by: INTERNAL MEDICINE

## 2017-08-28 PROCEDURE — 83735 ASSAY OF MAGNESIUM: CPT | Performed by: INTERNAL MEDICINE

## 2017-08-28 PROCEDURE — 85025 COMPLETE CBC W/AUTO DIFF WBC: CPT | Performed by: INTERNAL MEDICINE

## 2017-08-28 RX ORDER — MAGNESIUM SULFATE 1 G/100ML
1 INJECTION INTRAVENOUS ONCE
Status: COMPLETED | OUTPATIENT
Start: 2017-08-28 | End: 2017-08-31

## 2017-08-28 RX ORDER — POTASSIUM CHLORIDE 14.9 MG/ML
20 INJECTION INTRAVENOUS
Status: COMPLETED | OUTPATIENT
Start: 2017-08-28 | End: 2017-08-31

## 2017-08-28 RX ADMIN — SODIUM CHLORIDE 100 ML/HR: 900 INJECTION, SOLUTION INTRAVENOUS at 12:21

## 2017-08-28 RX ADMIN — ENOXAPARIN SODIUM 40 MG: 40 INJECTION SUBCUTANEOUS at 08:43

## 2017-08-28 RX ADMIN — CYANOCOBALAMIN TAB 1000 MCG 1000 MCG: 1000 TAB at 08:40

## 2017-08-28 RX ADMIN — PREGABALIN 100 MG: 50 CAPSULE ORAL at 20:52

## 2017-08-28 RX ADMIN — POTASSIUM CHLORIDE 20 MEQ: 200 INJECTION, SOLUTION INTRAVENOUS at 14:39

## 2017-08-28 RX ADMIN — ACETAMINOPHEN 650 MG: 325 TABLET, FILM COATED ORAL at 11:44

## 2017-08-28 RX ADMIN — SODIUM CHLORIDE 100 ML/HR: 900 INJECTION, SOLUTION INTRAVENOUS at 22:29

## 2017-08-28 RX ADMIN — Medication 400 MG: at 17:06

## 2017-08-28 RX ADMIN — CLOPIDOGREL BISULFATE 75 MG: 75 TABLET ORAL at 08:39

## 2017-08-28 RX ADMIN — PRAVASTATIN SODIUM 40 MG: 20 TABLET ORAL at 21:00

## 2017-08-28 RX ADMIN — DEXAMETHASONE 4 MG: 4 TABLET ORAL at 10:19

## 2017-08-28 RX ADMIN — CEFTRIAXONE 1 G: 1 INJECTION, POWDER, FOR SOLUTION INTRAMUSCULAR; INTRAVENOUS at 17:00

## 2017-08-28 RX ADMIN — POTASSIUM CHLORIDE 20 MEQ: 200 INJECTION, SOLUTION INTRAVENOUS at 12:20

## 2017-08-28 RX ADMIN — POTASSIUM CHLORIDE 20 MEQ: 200 INJECTION, SOLUTION INTRAVENOUS at 16:57

## 2017-08-28 RX ADMIN — PREGABALIN 100 MG: 50 CAPSULE ORAL at 08:39

## 2017-08-28 RX ADMIN — MAGNESIUM SULFATE HEPTAHYDRATE 1 G: 1 INJECTION, SOLUTION INTRAVENOUS at 12:26

## 2017-08-28 RX ADMIN — FLUOXETINE 20 MG: 20 CAPSULE ORAL at 08:39

## 2017-08-28 RX ADMIN — PANTOPRAZOLE SODIUM 40 MG: 40 TABLET, DELAYED RELEASE ORAL at 08:40

## 2017-08-28 RX ADMIN — CLORAZEPATE DIPOTASSIUM 3.75 MG: 7.5 TABLET ORAL at 08:39

## 2017-08-28 RX ADMIN — Medication 400 MG: at 08:40

## 2017-08-28 NOTE — PROGRESS NOTES
Problem: Mobility Impaired (Adult and Pediatric)  Goal: *Acute Goals and Plan of Care (Insert Text)  STG:  (1.)Ms. Cara Gonzalez will move from supine to sit and sit to supine with MOD ASSIST within 3 day(s). Met 8/28  (2.)Ms. Cara Gonzalez will transfer from bed to chair and chair to bed with MINIMAL ASSIST using the least restrictive device within 3 day(s). Met 8/28    LTG:  (1.)Ms. Cara Gonzalez will move from supine to sit and sit to supine in bed with MINIMAL ASSIST within 7 day(s). (2.)Ms. Cara Gonzalez will transfer from bed to chair and chair to bed with CONTACT GUARD ASSIST using the least restrictive device within 7 day(s). (3.)Ms. Cara Gonzalez will ambulate with CONTACT GUARD ASSIST for 150 feet with the least restrictive device within 7 day(s). ________________________________________________________________________________________________      PHYSICAL THERAPY: Daily Note, Treatment Day: 3rd and AM 8/28/2017  INPATIENT: Hospital Day: 4  [place] / Product Type: Medicare /      NAME/AGE/GENDER: Mica Payer is a 74 y.o. female    PRIMARY DIAGNOSIS: UTI (urinary tract infection)  UTI (urinary tract infection) UTI (urinary tract infection) UTI (urinary tract infection)        ICD-10: Treatment Diagnosis:       · Generalized Muscle Weakness (M62.81)  · Difficulty in walking, Not elsewhere classified (R26.2)   Precaution/Allergies:  Review of patient's allergies indicates no known allergies. ASSESSMENT:      Ms. Cara Gonzalez showed increased gait distance but still weak & unsteady with functional mobility. Pt will need additional rehab stay @ SNF. Pt is currently not functioning at an [facility] level of care. This section established at most recent assessment   PROBLEM LIST (Impairments causing functional limitations):  1. Decreased Strength  2. Decreased Transfer Abilities  3. Decreased Ambulation Ability/Technique  4. Decreased Balance  5. Decreased Activity Tolerance  6.  Decreased Cognition INTERVENTIONS PLANNED: (Benefits and precautions of physical therapy have been discussed with the patient.)  1. Balance Exercise  2. Bed Mobility  3. Family Education  4. Gait Training  5. Home Exercise Program (HEP)  6. Manual Therapy  7. Neuromuscular Re-education/Strengthening  8. Therapeutic Activites  9. Therapeutic Exercise/Strengthening  10. Group Therapy      TREATMENT PLAN: Frequency/Duration: daily for duration of hospital stay  Rehabilitation Potential For Stated Goals: GOOD      RECOMMENDED REHABILITATION/EQUIPMENT: (at time of discharge pending progress): Continue Skilled Therapy and rehab stay @ SNF. HISTORY:   History of Present Injury/Illness (Reason for Referral):  Deconditioning secondary to hospitalization  Taken from H&P:Ms. Leif Vegas is a 75 yo female with PMH of multiple myeloma, CVA, memory loss, recurrent UTI evaluated for progressive weakness and concern for recurrent UTI. Presently lives at [address] [place] [address]. Follows with [facility] neurology and had recent memory testing. Past Medical History/Comorbidities:   Ms. Leif Vegas  has a past medical history of Anemia; Anxiety; Back ache; Benign paroxysmal positional vertigo; Cancer ([address] Contact dermatitis and other eczema, due to unspecified cause; Disease of esophagus; DVT (deep venous thrombosis) ([address] (); Dyslipidemia; Edema; Esophageal reflux; FHx: malignant neoplasm of gastrointestinal tract; GERD (gastroesophageal reflux disease); Herpes zoster; History of autologous stem cell transplant ([address] (10/2013); HLD (hyperlipidemia); Hypercholesterolemia; Multiple myeloma ([address] (); Neuropathy ([address] Peripheral neuropathy ([address] Primary hypercoagulable state ([address] Rosacea; Rosacea; Shingles; Shingles (); and Stroke [facility]). She also has no past medical history of Chronic kidney disease.   Ms. Leif Vegas  has a past surgical history that includes  section; tubal ligation; colonoscopy (); other surgical (3/06); endoscopy (3/08); heent (2014); and cholecystectomy (2016). Social History/Living Environment:   Home Environment: Other (comment) (memory care; assisted living)  # Steps to Enter: 0  One/Two Story Residence: One story  Living Alone: No  Support Systems: Long term acute care  Patient Expects to be Discharged to[de-identified] Skilled nursing facility  Current DME Used/Available at Home: Walker  Prior Level of Function/Work/Activity:  Unable to determine. Pt poor historian. Family not present  Personal Factors:          Sex:  female        Age:  74 y.o. Number of Personal Factors/Comorbidities that affect the Plan of Care: 1-2: MODERATE COMPLEXITY   EXAMINATION:   Most Recent Physical Functioning:   Gross Assessment:                       Balance:  Sitting: Intact; Without support  Standing: Impaired; With support (walker) Bed Mobility:  Supine to Sit:  (NT)  Sit to Supine:  (NT)  Scooting: Minimum assistance       Transfers:  Sit to Stand: Minimum assistance  Stand to Sit: Minimum assistance  Bed to Chair: Minimum assistance (with walker)  Duration: 25 Minutes (extra time to work through activity noted)  Gait:     Gait Abnormalities: Decreased step clearance;Shuffling gait  Distance (ft): 50 Feet (ft)  Assistive Device: Walker, rolling  Ambulation - Level of Assistance: Minimal assistance      Body Structures Involved:  1. Nerves  2. Joints  3. Muscles  4. Ligaments Body Functions Affected:  1. Mental  2. Sensory/Pain  3. Neuromusculoskeletal  4. Movement Related Activities and Participation Affected:  1. Learning and Applying Knowledge  2. General Tasks and Demands  3. Mobility  4. Self Care  5. Domestic Life   Number of elements that affect the Plan of Care: 1-2: LOW COMPLEXITY   CLINICAL PRESENTATION:   Presentation: Stable and uncomplicated: LOW COMPLEXITY   CLINICAL DECISION MAKIN [facility] [address] [id number] AM-PAC 6 Clicks   Basic Mobility Inpatient Short Form  How much difficulty does the patient currently have. .. Unable A Lot A Little None   1. Turning over in bed (including adjusting bedclothes, sheets and blankets)? [ ] 1   [X] 2   [ ] 3   [ ] 4   2. Sitting down on and standing up from a chair with arms ( e.g., wheelchair, bedside commode, etc.)   [ ] 1   [X] 2   [ ] 3   [ ] 4   3. Moving from lying on back to sitting on the side of the bed? [ ] 1   [X] 2   [ ] 3   [ ] 4   How much help from another person does the patient currently need. .. Total A Lot A Little None   4. Moving to and from a bed to a chair (including a wheelchair)? [ ] 1   [X] 2   [ ] 3   [ ] 4   5. Need to walk in hospital room? [ ] 1   [X] 2   [ ] 3   [ ] 4   6. Climbing 3-5 steps with a railing? [X] 1   [ ] 2   [ ] 3   [ ] 4   © 2007, Trustees of [address] [place] [id number], under license to [employer]. All rights reserved    Score:  Initial: 11 Most Recent: X (Date: -- )     Interpretation of Tool:  Represents activities that are increasingly more difficult (i.e. Bed mobility, Transfers, Gait). Score 24 23 22-20 19-15 14-10 9-7 6       Modifier CH CI CJ CK CL CM CN         · Mobility - Walking and Moving Around:               - CURRENT STATUS:    CL - 60%-79% impaired, limited or restricted               - GOAL STATUS:           CK - 40%-59% impaired, limited or restricted               - D/C STATUS:                       ---------------To be determined---------------  [place] / Product Type: Medicare /       Medical Necessity:     · Skilled intervention continues to be required due to current poor functional mobility and likely worse than PLOF. Reason for Services/Other Comments:  · Patient continues to require skilled intervention due to likely higher PLOF living in assisted living memory care.    Use of outcome tool(s) and clinical judgement create a POC that gives a: Clear prediction of patient's progress: LOW COMPLEXITY                 TREATMENT:   (In addition to Assessment/Re-Assessment sessions the following treatments were rendered)   Pre-treatment Symptoms/Complaints:  weakness  Pain: Initial: visual scale  Pain Intensity 1: 0  Post Session:  0    Therapeutic Activity: (  25 Minutes (extra time to work through activity noted) ):  Therapeutic activities including transfers, standing balance & short distance ambulation with rolling walker to improve mobility, strength, balance, coordination and dynamic movement of arm - bilateral, leg - bilateral and core to improve functional WB potential.          Braces/Orthotics/Lines/Etc:   · IV  Treatment/Session Assessment:    · Response to Treatment:  Pt was easily SOB with minimal exertion  · Interdisciplinary Collaboration:  · Registered Nurse  · After treatment position/precautions:  · Up in chair, Bed/Chair-wheels locked, Call light within reach and RN notified  · Compliance with Program/Exercises: Will assess as treatment progresses. · Recommendations/Intent for next treatment session: \"Next visit will focus on advancements to more challenging activities and reduction in assistance provided\".   Total Treatment Duration:  PT Patient Time In/Time Out  Time In: 0930  Time Out: Desiree Mccauley7, PT

## 2017-08-28 NOTE — PROGRESS NOTES
To bed with asst by Naz Santana, PCT and Francia La, PCT. Incontinent of loose stool with care given and repositioning. No c/o. No distress noted.

## 2017-08-28 NOTE — PROGRESS NOTES
Care Management Interventions  Mode of Transport at Discharge: Other (see comment)  Transition of Care Consult (CM Consult): Discharge Planning  Physical Therapy Consult: Yes  Current Support Network: Assisted Living  Confirm Follow Up Transport: Family  Plan discussed with Pt/Family/Caregiver: Yes  Freedom of Choice Offered: Yes  Discharge Location  Discharge Placement: Skilled nursing facility     8-29-17   Bed offer to [facility]. JOSE JUAN spoke with spouse who agrees to contact Eleanor at [facility] & complete paperwork. Bed available on Thurs. Referral per MD.  D/C planning. SW speaks with pt who is alert and oriented to self. Spouse at bedside. SW discussed MD recommends STR at a SNF before pt returns to [facility]. Spouse agreeable & prefers [facility]. JOSE JUAN made referral via CC. Will await bed offer.

## 2017-08-28 NOTE — PROGRESS NOTES
Continues to rest quietly, in bed, resp even, unlab. Assessment noted. Reminded to call for all needs, agreed. Side rails up x 3, bed alarm set. Oriented to person, place, forgetful with time. No needs, no c/o, no distress noted.

## 2017-08-28 NOTE — PROGRESS NOTES
Sitting quietly in recliner with no c/o. Resp even, unlab, skin warm, dry. Alert. No needs, no distress noted.

## 2017-08-28 NOTE — PROGRESS NOTES
AM Assessment. Pt. Alert, oriented to person and place. Respirations even and unlabored. Lungs clear. ABd. Soft and non tender, BS active X4. IV patent and infusing. Pt. Has no complaints at this time. Call light within reach, bed alarm on.

## 2017-08-28 NOTE — PROGRESS NOTES
Continues to rest quietly, resp even, unlab, facial expression relaxed with no distress noted during bedside report given to [facility], RN.

## 2017-08-28 NOTE — PROGRESS NOTES
Problem: Self Care Deficits Care Plan (Adult)  Goal: *Acute Goals and Plan of Care (Insert Text)  1. Patient will perform grooming with supervision. 2. Patient will perform Upper body dressing with CGA assistance  3. Patient will perform lower body dressing with minimal assistance  4. Patient will perform upper and lower body bathing with minimal assistance. 5. Patient will perform toilet transfers with CGA assistance  6. Patient will perform shower transfer with minimal assistance  7. Patient will participate in 30 + minutes of ADL/ therapeutic exercise/therapeutic activity with min rest breaks to increase activity tolerance for self care. 8. Patient will perform ADL functional mobility in room with CGA. Goals to be achieved in 7 days. OCCUPATIONAL THERAPY: Daily Note, Treatment Day: 1st and AM 8/28/2017  INPATIENT: Hospital Day: 4  [place] / Product Type: Medicare /      NAME/AGE/GENDER: Prisca Dooley is a 74 y.o. female    PRIMARY DIAGNOSIS:  UTI (urinary tract infection)  UTI (urinary tract infection) UTI (urinary tract infection) UTI (urinary tract infection)        ICD-10: Treatment Diagnosis:        · Other lack of cordination (R27.8)   Precautions/Allergies:         Review of patient's allergies indicates no known allergies. ASSESSMENT:      Ms. Soundra Crigler presents with secondary concerns regarding functional mobility and self-care independence due to s/p UTI. Pt completed sponge bath and gown change with the assistance listed below. Pt completed functional transfer with min assistance. Skilled OT services are indicated and required at this time to continue to assist self-care performance and functional mobility. This section established at most recent assessment   PROBLEM LIST (Impairments causing functional limitations):  1. Decreased Strength  2. Decreased ADL/Functional Activities  3. Decreased Balance  4.  Decreased Activity Tolerance  5. Decreased Cognition    INTERVENTIONS PLANNED: (Benefits and precautions of occupational therapy have been discussed with the patient.)  1. Activities of daily living training  2. Therapeutic activity  3. Therapeutic exercise  4. Self-care and functional mobility training      TREATMENT PLAN: Frequency/Duration: Follow patient 1x to address above goals. Rehabilitation Potential For Stated Goals: GOOD      RECOMMENDED REHABILITATION/EQUIPMENT: (at time of discharge pending progress): Continue Skilled Therapy. OCCUPATIONAL PROFILE AND HISTORY:   History of Present Injury/Illness (Reason for Referral):  UTI; gait mobility, and decreased self cares and functional mobility  Past Medical History/Comorbidities:   Ms. Amarilys Call  has a past medical history of Anemia; Anxiety; Back ache; Benign paroxysmal positional vertigo; Cancer ([address] Contact dermatitis and other eczema, due to unspecified cause; Disease of esophagus; DVT (deep venous thrombosis) ([address] (); Dyslipidemia; Edema; Esophageal reflux; FHx: malignant neoplasm of gastrointestinal tract; GERD (gastroesophageal reflux disease); Herpes zoster; History of autologous stem cell transplant ([address] (10/2013); HLD (hyperlipidemia); Hypercholesterolemia; Multiple myeloma ([address] (); Neuropathy ([address] Peripheral neuropathy ([address] Primary hypercoagulable state ([address] Rosacea; Rosacea; Shingles; Shingles (); and Stroke [facility]). She also has no past medical history of Chronic kidney disease. Ms. Amarilys Call  has a past surgical history that includes  section; tubal ligation; colonoscopy (); other surgical (3/06); endoscopy (3/08); heent (2014); and cholecystectomy (2016).   Social History/Living Environment:   Home Environment: Other (comment) (memory care; assisted living)  # Steps to Enter: 0  One/Two Story Residence: One story  Living Alone: No  Support Systems: Long term acute care  Patient Expects to be Discharged to[de-identified] Skilled nursing facility  Current DME Used/Available at Home: Xuan Sanchez  Prior Level of Function/Work/Activity:  Ms. José Miguel Lopez was a poor historian, however, reports she requires assistance with all ADLs      Number of Personal Factors/Comorbidities that affect the Plan of Care: Brief history (0):  LOW COMPLEXITY   ASSESSMENT OF OCCUPATIONAL PERFORMANCE[de-identified]   Activities of Daily Living:         Assistance required due to cognition and strength  Basic ADLs (From Assessment) Complex ADLs (From Assessment)   Basic ADL  Feeding: Setup  Oral Facial Hygiene/Grooming: Setup  Bathing: Maximum assistance  Upper Body Dressing: Maximum assistance  Lower Body Dressing: Maximum assistance  Toileting: Total assistance Instrumental ADL  Meal Preparation: Total assistance  Homemaking: Total assistance  Medication Management: Total assistance  Financial Management: Total assistance   Grooming/Bathing/Dressing Activities of Daily Living   Grooming  Washing Face: Supervision/set-up  Washing Hands: Supervision/set-up Cognitive Retraining  Safety/Judgement: Fall prevention   Upper Body Bathing  Bathing Assistance: Supervision/set-up  Position Performed: Seated edge of bed     Lower Body Bathing  Bathing Assistance: Moderate assistance  Perineal  : Maximum assistance  Lower Body : Moderate assistance  Position Performed: Seated in chair     Upper Body Dressing Assistance  Dressing Assistance: [address]: Supervision/ set-up     Lower Body Dressing Assistance  Dressing Assistance: Total assistance(dependent)  Protective Undergarmet: Total assistance (dependent)  Socks: Total assistance (dependent)  Cues: Doff;Don;Physical assistance Bed/Mat Mobility  Supine to Sit: Moderate assistance  Sit to Stand: Maximum assistance;Assist x2  Bed to Chair: Maximum assistance;Assist x2          Most Recent Physical Functioning:   Gross Assessment:                  Posture:     Balance:  Sitting: Intact  Standing: Pull to stand; With support Bed Mobility:  Supine to Sit: Moderate assistance  Wheelchair Mobility:     Transfers:  Sit to Stand: Maximum assistance;Assist x2  Bed to Chair: Maximum assistance;Assist x2      Activities of Daily Living:         Assistance required  Basic ADLs (From Assessment) Complex ADLs (From Assessment)   Basic ADL  Feeding: Setup  Oral Facial Hygiene/Grooming: Setup  Bathing: Maximum assistance  Upper Body Dressing: Maximum assistance  Lower Body Dressing: Maximum assistance  Toileting: Total assistance Instrumental ADL  Meal Preparation: Total assistance  Homemaking: Total assistance  Medication Management: Total assistance  Financial Management: Total assistance   Grooming/Bathing/Dressing Activities of Daily Living   Grooming  Washing Face: Supervision/set-up  Washing Hands: Supervision/set-up Cognitive Retraining  Safety/Judgement: Fall prevention   Upper Body Bathing  Bathing Assistance: Supervision/set-up  Position Performed: Seated edge of bed     Lower Body Bathing  Bathing Assistance: Moderate assistance  Perineal  : Maximum assistance  Lower Body : Moderate assistance  Position Performed: Seated in chair     Upper Body Dressing Assistance  Dressing Assistance: [address]: Supervision/ set-up     Lower Body Dressing Assistance  Dressing Assistance: Total assistance(dependent)  Protective Undergarmet: Total assistance (dependent)  Socks:  Total assistance (dependent)  Cues: Doff;Don;Physical assistance Bed mobility: max assistance  Sit to stand: min assistance   Chair to bed: min assistance                      Patient Vitals for the past 6 hrs:   BP BP Patient Position SpO2 Pulse   08/28/17 0800 158/63 At rest 100 % 83        Mental Status  Neurologic State: Alert  Orientation Level: Oriented to place, Oriented to person  Cognition: Follows commands  Perception: Verbal, Tactile  Perseveration: Verbal cues provided, Tactile cues provided  Safety/Judgement: Fall prevention Physical Skills Involved:  1. Balance  2. Strength  3. Activity Tolerance Cognitive Skills Affected (resulting in the inability to perform in a timely and safe manner):  1. Executive Function  2. Sustained Attention Psychosocial Skills Affected:  1. Habits/Routines  2. Self-Awareness   Number of elements that affect the Plan of Care: 5+:  HIGH COMPLEXITY   CLINICAL DECISION MAKING:   Clem Santana AM-PAC 6 Clicks   Daily Activity Inpatient Short Form  How much help from another person does the patient currently need. .. Total A Lot A Little None   1. Putting on and taking off regular lower body clothing?   [ ] 1   [ ] 2   [X] 3   [ ] 4   2. Bathing (including washing, rinsing, drying)? [ ] 1   [X] 2   [ ] 3   [ ] 4   3. Toileting, which includes using toilet, bedpan or urinal?   [ ] 1   [X] 2   [ ] 3   [ ] 4   4. Putting on and taking off regular upper body clothing?   [ ] 1   [ ] 2   [X] 3   [ ] 4   5. Taking care of personal grooming such as brushing teeth? [ ] 1   [ ] 2   [ ] 3   [X] 4   6. Eating meals? [ ] 1   [ ] 2   [ ] 3   [X] 4   © 2007, Trustees of Clem Santana, under license to [employer]. All rights reserved    Score:  Initial: 18 Most Recent: X (Date: -- )     Interpretation of Tool:  Represents activities that are increasingly more difficult (i.e. Bed mobility, Transfers, Gait).        Score 24 23 22-20 19-15 14-10 9-7 6       Modifier CH CI CJ CK CL CM CN         · Self Care:               - CURRENT STATUS:    CK - 40%-59% impaired, limited or restricted               - GOAL STATUS:           CJ - 20%-39% impaired, limited or restricted               - D/C STATUS:                       ---------------To be determined---------------  [place] / Product Type: Medicare /       Medical Necessity:     · Patient is expected to demonstrate progress in balance, coordination and functional technique to decrease assistance required with self care and functional mobility and improve safety during self care and functional mobility. Reason for Services/Other Comments:  · Patient continues to require skilled intervention due to decreased self care and functional mobility. Use of outcome tool(s) and clinical judgement create a POC that gives a: LOW COMPLEXITY             TREATMENT:   (In addition to Assessment/Re-Assessment sessions the following treatments were rendered)      Pre-treatment Symptoms/Complaints:  Cooperative, no pain reported  Pain: Initial:   Pain Intensity 1: 0 0/10 Post Session:  0/10      Self Care: (25): Procedure(s) (per grid) utilized to improve and/or restore self-care/home management as related to dressing and bathing. Required max verbal and manual cueing to facilitate activities of daily living skills. Braces/Orthotics/Lines/Etc:   · IV  Treatment/Session Assessment:    · Response to Treatment:  Responded well, tolerated therapy  · Interdisciplinary Collaboration:  · Physical Therapist, Certified Occupational Therapy Assistant, Registered Nurse, Rehabilitation Attendant and Certified Nursing Assistant/Patient Care Technician  · After treatment position/precautions:  · Up in chair, Bed/Chair-wheels locked, Call light within reach and RN notified  · Compliance with Program/Exercises: compliant all of the time. · Recommendations/Intent for next treatment session: \"Next visit will focus on self care and functional mobility\".   Total Treatment Duration:  minutesOT Patient Time In/Time Out  Time In: 0900  Time Out: [address], [place]

## 2017-08-28 NOTE — PROGRESS NOTES
Initial visit by  to convey care and concern and encourage patient that  services are available if desired. Ms. Ankit Garcia expressed appreciation for the visit. I offered spiritual interventions during the visit including, affirmation of jil, exploration of coping skill, and prayer as requested.      Eloy Villalobos 66  Board Certified

## 2017-08-28 NOTE — PROGRESS NOTES
Hospitalist Progress Note     Admit Date:  2017  1:36 PM   Name:  Audrey Espinoza   Age:  74 y.o.  :  1945   MRN:  [id number]   PCP:  Shola Eastman MD  Treatment Team: Attending Provider: Charlene Novak MD; Primary Nurse: Sabrina Glez RN; Utilization Review: Cesar Smith RN    Subjective:     Ms. Negrita Chan is a 73 yo female with PMH of multiple myeloma, CVA, memory loss, recurrent UTI evaluated for progressive weakness and concern for recurrent UTI. Presently lives at [address] [place] [address]. Follows with [facility] neurology and had recent memory testing. Has taken several rounds of cipro without movement. Last urine cx 17 with ECOLI and Klebsiella, sensitive to rocephin.  and son at bedside report anorexia, some loose BM.     : up in chair. Has no complaint today. No fever or chills. No nausea or vomiting. No flank or suprapubic pain. Is weak.  present. He would like her to go for rehab as intensity of PT that she would receive at her memory facility not as intesive. Objective:   Patient Vitals for the past 24 hrs:   Temp Pulse Resp BP SpO2   17 0800 97.6 °F (36.4 °C) 83 17 158/63 100 %   17 0338 96.2 °F (35.7 °C) 78 18 147/78 95 %   17 2349 98 °F (36.7 °C) 80 16 143/79 95 %   17 1830 98.5 °F (36.9 °C) 79 18 145/72 96 %   17 1526 97.8 °F (36.6 °C) 73 18 119/69 96 %   17 1200 98.4 °F (36.9 °C) 74 18 141/71 96 %     Oxygen Therapy  O2 Sat (%): 100 % (17 0800)  Pulse via Oximetry: 70 beats per minute (17 1555)  O2 Device: Room air (17 1344)  No intake or output data in the 24 hours ending 17 1146      General:    Well nourished. Alert. Poor memory. No distress   CV:   RRR. No murmur, rub, or gallop. Lungs:   Clear to auscultation bilaterally. No wheezing, rhonchi, or rales. Abdomen:   Soft, nontender, nondistended. Bowel sounds normal.   Extremities: Warm and dry. No cyanosis or edema.    Skin:     No rashes or jaundice. Current Meds:  Current Facility-Administered Medications   Medication Dose Route Frequency    magnesium oxide (MAG-OX) tablet 400 mg  400 mg Oral BID    clopidogrel (PLAVIX) tablet 75 mg  75 mg Oral DAILY    clorazepate (TRANXENE) tablet 3.75 mg  3.75 mg Oral DAILY    colestipol (COLESTID) tablet 1 g  1 g Oral BID    cyanocobalamin tablet 1,000 mcg  1,000 mcg Oral DAILY    dexamethasone (DECADRON) tablet 4 mg  4 mg Oral every Monday    pantoprazole (PROTONIX) tablet 40 mg  40 mg Oral ACB    FLUoxetine (PROzac) capsule 20 mg  20 mg Oral DAILY    pregabalin (LYRICA) capsule 100 mg  100 mg Oral BID    pravastatin (PRAVACHOL) tablet 40 mg  40 mg Oral QHS    sodium chloride (NS) flush 5-10 mL  5-10 mL IntraVENous Q8H    sodium chloride (NS) flush 5-10 mL  5-10 mL IntraVENous PRN    acetaminophen (TYLENOL) tablet 650 mg  650 mg Oral Q6H PRN    ondansetron (ZOFRAN) injection 4 mg  4 mg IntraVENous Q4H PRN    enoxaparin (LOVENOX) injection 40 mg  40 mg SubCUTAneous Q24H    cefTRIAXone (ROCEPHIN) 1 g in 0.9% sodium chloride (MBP/ADV) 50 mL  1 g IntraVENous Q24H    0.9% sodium chloride infusion  100 mL/hr IntraVENous CONTINUOUS       Labs and Studies:  I have reviewed all labs, meds, telemetry events, and studies from the last 24 hours.   Recent Results (from the past 24 hour(s))   PLEASE READ & DOCUMENT PPD TEST IN 24 HRS    Collection Time: 08/27/17  5:50 PM   Result Value Ref Range    PPD  Negative    mm Induration  mm   PLEASE READ & DOCUMENT PPD TEST IN 48 HRS    Collection Time: 08/27/17  6:00 PM   Result Value Ref Range    PPD  Negative    mm Induration  mm   METABOLIC PANEL, BASIC    Collection Time: 08/28/17  5:56 AM   Result Value Ref Range    Sodium 142 136 - 145 mmol/L    Potassium 2.5 (LL) 3.5 - 5.1 mmol/L    Chloride 104 98 - 107 mmol/L    CO2 32 21 - 32 mmol/L    Anion gap 6 (L) 7 - 16 mmol/L    Glucose 88 65 - 100 mg/dL    BUN 8 8 - 23 MG/DL    Creatinine 0.49 (L) 0.6 - 1.0 MG/DL    GFR est AA >60 >60 ml/min/1.73m2    GFR est non-AA >60 >60 ml/min/1.73m2    Calcium 7.3 (L) 8.3 - 10.4 MG/DL   CBC WITH AUTOMATED DIFF    Collection Time: 08/28/17  5:56 AM   Result Value Ref Range    WBC 3.6 (L) 4.3 - 11.1 K/uL    RBC 2.95 (L) 4.05 - 5.25 M/uL    HGB 10.2 (L) 11.7 - 15.4 g/dL    HCT 30.6 (L) 35.8 - 46.3 %    .7 (H) 79.6 - 97.8 FL    MCH 34.6 (H) 26.1 - 32.9 PG    MCHC 33.3 31.4 - 35.0 g/dL    RDW 15.1 (H) 11.9 - 14.6 %    PLATELET 891 (L) 784 - 450 K/uL    MPV 12.0 10.8 - 14.1 FL    DF AUTOMATED      NEUTROPHILS 55 43 - 78 %    LYMPHOCYTES 28 13 - 44 %    MONOCYTES 13 (H) 4.0 - 12.0 %    EOSINOPHILS 3 0.5 - 7.8 %    BASOPHILS 1 0.0 - 2.0 %    IMMATURE GRANULOCYTES 0.3 0.0 - 5.0 %    ABS. NEUTROPHILS 2.0 1.7 - 8.2 K/UL    ABS. LYMPHOCYTES 1.0 0.5 - 4.6 K/UL    ABS. MONOCYTES 0.5 0.1 - 1.3 K/UL    ABS. EOSINOPHILS 0.1 0.0 - 0.8 K/UL    ABS. BASOPHILS 0.1 0.0 - 0.2 K/UL    ABS. IMM.  GRANS. 0.0 0.0 - 0.5 K/UL   MAGNESIUM    Collection Time: 08/28/17  5:56 AM   Result Value Ref Range    Magnesium 1.7 (L) 1.8 - 2.4 mg/dL        All Micro Results     Procedure Component Value Units Date/Time    CULTURE, URINE [[id number]]  (Abnormal) Collected:  08/25/17 1448    Order Status:  Completed Specimen:  Urine from Clean catch Updated:  08/28/17 1035     Special Requests: NO SPECIAL REQUESTS        Culture result:         10,000 to 50,000 COLONIES/mL GRAM NEGATIVE RODS IDENTIFICATION AND SUSCEPTIBILITY TO FOLLOW (A)              10,000 to 50,000 COLONIES/mL GRAM POSITIVE COCCI IDENTIFICATION AND SUSCEPTIBILITY TO FOLLOW (A)    MRSA SCREEN - PCR (NASAL) [[id number]] Collected:  08/25/17 1819    Order Status:  Completed Specimen:  Nasal from Nares Updated:  08/25/17 2036     Special Requests: NO SPECIAL REQUESTS        Culture result:         MRSA target DNA is not detected (presumptive not colonized with MRSA)    CULTURE, URINE [[id number]] Collected:  08/25/17 1715    Order Status:  Canceled Specimen:  Urine from Clean catch           Recent Imaging:  CXR Results  (Last 48 hours)    None        CT Results  (Last 48 hours)    None          Assessment and Plan:     Hospital Problems as of 8/28/2017  Date Reviewed: 7/31/2017          Codes Class Noted - Resolved POA    * (Principal)UTI (urinary tract infection) ICD-10-CM: N39.0  ICD-9-CM: 599.0  8/25/2017 - Present Yes        Cerebrovascular accident (CVA) ([facility] ICD-10-CM: I63.9  ICD-9-CM: 434.91  3/1/2017 - Present Yes    Overview Signed 6/16/2017 11:10 AM by Nicolasa Phan LPN     Overview:   Etiology unclear without results of tests done    Last Assessment & Plan:   Needs f/u PRN             Hypokalemia ICD-10-CM: E87.6  ICD-9-CM: 276.8  9/30/2016 - Present Yes        Drug-induced polyneuropathy ([facility] ICD-10-CM: G62.0  ICD-9-CM: 357.6, E980.5  9/30/2016 - Present Yes        IgG multiple myeloma ([facility] ICD-10-CM: C90.00  ICD-9-CM: 203.00  1/20/2016 - Present Yes    Overview Signed 1/20/2016 10:02 AM by Eddie Aragon MD     kappa-prior stem cell transplant-M spike 0.2 12/2015                     PLAN:    · Continue IV rocephin  · Urine culture growing 10-50K gram negative rods. ID and sensitivity pending  · Both potassium and magnesium are low today. Need to replace. Recheck labs vanessa am  · On lovenox for DVT prophylaxis    DC planning:  I, patient's  and SW all discussed.  would like for his wife to go to a skilled facility for STR so that she can receive more intensive therapy. Also, it seems she has had several UTIs as of late. Perhaps she should be on a suppressive medication such as macrobid and be referred to urology as outpatient.          Signed:  Jayant Lujan MD

## 2017-08-29 LAB
ANION GAP SERPL CALC-SCNC: 5 MMOL/L (ref 7–16)
BACTERIA SPEC CULT: ABNORMAL
BACTERIA SPEC CULT: ABNORMAL
BUN SERPL-MCNC: 11 MG/DL (ref 8–23)
CALCIUM SERPL-MCNC: 8 MG/DL (ref 8.3–10.4)
CHLORIDE SERPL-SCNC: 106 MMOL/L (ref 98–107)
CO2 SERPL-SCNC: 31 MMOL/L (ref 21–32)
CREAT SERPL-MCNC: 0.54 MG/DL (ref 0.6–1)
GLUCOSE SERPL-MCNC: 104 MG/DL (ref 65–100)
MAGNESIUM SERPL-MCNC: 1.8 MG/DL (ref 1.8–2.4)
POTASSIUM SERPL-SCNC: 3.1 MMOL/L (ref 3.5–5.1)
SERVICE CMNT-IMP: ABNORMAL
SODIUM SERPL-SCNC: 142 MMOL/L (ref 136–145)

## 2017-08-29 PROCEDURE — 74011250637 HC RX REV CODE- 250/637: Performed by: HOSPITALIST

## 2017-08-29 PROCEDURE — 74011250636 HC RX REV CODE- 250/636: Performed by: INTERNAL MEDICINE

## 2017-08-29 PROCEDURE — 65270000029 HC RM PRIVATE

## 2017-08-29 PROCEDURE — 83735 ASSAY OF MAGNESIUM: CPT | Performed by: HOSPITALIST

## 2017-08-29 PROCEDURE — 80048 BASIC METABOLIC PNL TOTAL CA: CPT | Performed by: HOSPITALIST

## 2017-08-29 PROCEDURE — 74011250637 HC RX REV CODE- 250/637: Performed by: INTERNAL MEDICINE

## 2017-08-29 PROCEDURE — 97530 THERAPEUTIC ACTIVITIES: CPT

## 2017-08-29 PROCEDURE — 97110 THERAPEUTIC EXERCISES: CPT

## 2017-08-29 PROCEDURE — 36415 COLL VENOUS BLD VENIPUNCTURE: CPT | Performed by: HOSPITALIST

## 2017-08-29 RX ORDER — POTASSIUM CHLORIDE 20MEQ/15ML
40 LIQUID (ML) ORAL
Status: COMPLETED | OUTPATIENT
Start: 2017-08-29 | End: 2017-08-29

## 2017-08-29 RX ORDER — AMPICILLIN 500 MG/1
500 CAPSULE ORAL EVERY 8 HOURS
Status: DISCONTINUED | OUTPATIENT
Start: 2017-08-29 | End: 2017-08-31 | Stop reason: HOSPADM

## 2017-08-29 RX ORDER — CIPROFLOXACIN 500 MG/1
500 TABLET ORAL EVERY 12 HOURS
Status: DISCONTINUED | OUTPATIENT
Start: 2017-08-29 | End: 2017-08-31 | Stop reason: HOSPADM

## 2017-08-29 RX ADMIN — FLUOXETINE 20 MG: 20 CAPSULE ORAL at 09:27

## 2017-08-29 RX ADMIN — CLORAZEPATE DIPOTASSIUM 3.75 MG: 7.5 TABLET ORAL at 09:27

## 2017-08-29 RX ADMIN — Medication 400 MG: at 17:49

## 2017-08-29 RX ADMIN — POTASSIUM CHLORIDE 40 MEQ: 20 SOLUTION ORAL at 11:00

## 2017-08-29 RX ADMIN — ACETAMINOPHEN 650 MG: 325 TABLET, FILM COATED ORAL at 11:02

## 2017-08-29 RX ADMIN — CYANOCOBALAMIN TAB 1000 MCG 1000 MCG: 1000 TAB at 09:27

## 2017-08-29 RX ADMIN — PREGABALIN 100 MG: 50 CAPSULE ORAL at 21:13

## 2017-08-29 RX ADMIN — Medication 400 MG: at 09:27

## 2017-08-29 RX ADMIN — CLOPIDOGREL BISULFATE 75 MG: 75 TABLET ORAL at 09:27

## 2017-08-29 RX ADMIN — ENOXAPARIN SODIUM 40 MG: 40 INJECTION SUBCUTANEOUS at 09:27

## 2017-08-29 RX ADMIN — Medication 10 ML: at 14:35

## 2017-08-29 RX ADMIN — AMPICILLIN 500 MG: 500 CAPSULE ORAL at 11:00

## 2017-08-29 RX ADMIN — CIPROFLOXACIN HYDROCHLORIDE 500 MG: 500 TABLET, FILM COATED ORAL at 11:00

## 2017-08-29 RX ADMIN — CIPROFLOXACIN HYDROCHLORIDE 500 MG: 500 TABLET, FILM COATED ORAL at 21:13

## 2017-08-29 RX ADMIN — PRAVASTATIN SODIUM 40 MG: 20 TABLET ORAL at 21:13

## 2017-08-29 RX ADMIN — PANTOPRAZOLE SODIUM 40 MG: 40 TABLET, DELAYED RELEASE ORAL at 08:10

## 2017-08-29 RX ADMIN — PREGABALIN 100 MG: 50 CAPSULE ORAL at 09:27

## 2017-08-29 RX ADMIN — SODIUM CHLORIDE 100 ML/HR: 900 INJECTION, SOLUTION INTRAVENOUS at 06:17

## 2017-08-29 RX ADMIN — AMPICILLIN 500 MG: 500 CAPSULE ORAL at 21:13

## 2017-08-29 NOTE — PROGRESS NOTES
Shift assessment completed. Pt alert and oriented to person only, denies any pain. Respirations even and unlabored, breath sounds clear bilaterally, pt on RA. Abdomen soft and non tender, bowel sounds active. Incontinent brief in place, not due to be changed. Skin warm and dry, IV fluids infusing without difficulty. Bed low and locked, alarm on, call light within reach, pt advise dot call if assistance is needed.

## 2017-08-29 NOTE — PROGRESS NOTES
Bedside shift change report given to Michelle (oncoming nurse) by Nora Mayen (offgoing nurse). Report included the following information SBAR, Kardex and Intake/Output.

## 2017-08-29 NOTE — PROGRESS NOTES
Problem: Mobility Impaired (Adult and Pediatric)  Goal: *Acute Goals and Plan of Care (Insert Text)  STG:  (1.)Ms. Elida Schulte will move from supine to sit and sit to supine with MOD ASSIST within 3 day(s). Met 8/28  (2.)Ms. Elida Schulte will transfer from bed to chair and chair to bed with MINIMAL ASSIST using the least restrictive device within 3 day(s). Met 8/28    LTG:  (1.)Ms. Elida Schulte will move from supine to sit and sit to supine in bed with MINIMAL ASSIST within 7 day(s). (2.)Ms. Elida Schulte will transfer from bed to chair and chair to bed with CONTACT GUARD ASSIST using the least restrictive device within 7 day(s). (3.)Ms. Elida Schulte will ambulate with CONTACT GUARD ASSIST for 150 feet with the least restrictive device within 7 day(s). ________________________________________________________________________________________________      PHYSICAL THERAPY: Daily Note, Treatment Day: 4th and AM 8/29/2017  INPATIENT: Hospital Day: 5  [place] / Product Type: Medicare /      NAME/AGE/GENDER: Travon Reynaga is a 74 y.o. female    PRIMARY DIAGNOSIS: UTI (urinary tract infection)  UTI (urinary tract infection) UTI (urinary tract infection) UTI (urinary tract infection)        ICD-10: Treatment Diagnosis:       · Generalized Muscle Weakness (M62.81)  · Difficulty in walking, Not elsewhere classified (R26.2)   Precaution/Allergies:  Review of patient's allergies indicates no known allergies. ASSESSMENT:      Ms. Elida Schulte is supine upon arrival.  She performs exercises in the bed without any problems. She practice bed mobility with Min A. She had a hard time following commands, during gait. Slightly confused this AM.  She was left up in chair with chair alarm. This section established at most recent assessment   PROBLEM LIST (Impairments causing functional limitations):  1. Decreased Strength  2. Decreased Transfer Abilities  3. Decreased Ambulation Ability/Technique  4.  Decreased Balance  5. Decreased Activity Tolerance  6. Decreased Cognition    INTERVENTIONS PLANNED: (Benefits and precautions of physical therapy have been discussed with the patient.)  1. Balance Exercise  2. Bed Mobility  3. Family Education  4. Gait Training  5. Home Exercise Program (HEP)  6. Manual Therapy  7. Neuromuscular Re-education/Strengthening  8. Therapeutic Activites  9. Therapeutic Exercise/Strengthening  10. Group Therapy      TREATMENT PLAN: Frequency/Duration: daily for duration of hospital stay  Rehabilitation Potential For Stated Goals: GOOD      RECOMMENDED REHABILITATION/EQUIPMENT: (at time of discharge pending progress): Rehab. HISTORY:   History of Present Injury/Illness (Reason for Referral):  Deconditioning secondary to hospitalization  Taken from H&P:Ms. José Miguel Lopez is a 75 yo female with PMH of multiple myeloma, CVA, memory loss, recurrent UTI evaluated for progressive weakness and concern for recurrent UTI. Presently lives at [address] [place] [address]. Follows with [facility] neurology and had recent memory testing. Past Medical History/Comorbidities:   Ms. José Miguel Lopez  has a past medical history of Anemia; Anxiety; Back ache; Benign paroxysmal positional vertigo; Cancer ([address] Contact dermatitis and other eczema, due to unspecified cause; Disease of esophagus; DVT (deep venous thrombosis) ([address] (); Dyslipidemia; Edema; Esophageal reflux; FHx: malignant neoplasm of gastrointestinal tract; GERD (gastroesophageal reflux disease); Herpes zoster; History of autologous stem cell transplant ([address] (10/2013); HLD (hyperlipidemia); Hypercholesterolemia; Multiple myeloma ([address] (); Neuropathy ([address] Peripheral neuropathy ([address] Primary hypercoagulable state ([address] Rosacea; Rosacea; Shingles; Shingles (); and Stroke [facility]). She also has no past medical history of Chronic kidney disease.   Ms. José Miguel Lopez  has a past surgical history that includes  section; tubal ligation; colonoscopy (); other surgical (3/06); endoscopy (3/08); heent (2014); and cholecystectomy (2016). Social History/Living Environment:   Home Environment: Other (comment) (memory care; assisted living)  # Steps to Enter: 0  One/Two Story Residence: One story  Living Alone: No  Support Systems: Long term acute care  Patient Expects to be Discharged to[de-identified] Skilled nursing facility  Current DME Used/Available at Home: Walker  Prior Level of Function/Work/Activity:  Unable to determine. Pt poor historian. Family not present  Personal Factors:          Sex:  female        Age:  74 y.o. Number of Personal Factors/Comorbidities that affect the Plan of Care: 1-2: MODERATE COMPLEXITY   EXAMINATION:   Most Recent Physical Functioning:   Gross Assessment:                       Balance:    Bed Mobility:  Supine to Sit: Minimum assistance  Sit to Supine:  (left up in chair)  Scooting: Minimum assistance       Transfers:  Sit to Stand: Minimum assistance  Stand to Sit: Minimum assistance  Bed to Chair: Minimum assistance  Duration: 15 Minutes  Gait:     Gait Abnormalities: Decreased step clearance  Distance (ft): 10 Feet (ft)  Assistive Device: Walker, rolling  Ambulation - Level of Assistance: Minimal assistance      Body Structures Involved:  1. Nerves  2. Joints  3. Muscles  4. Ligaments Body Functions Affected:  1. Mental  2. Sensory/Pain  3. Neuromusculoskeletal  4. Movement Related Activities and Participation Affected:  1. Learning and Applying Knowledge  2. General Tasks and Demands  3. Mobility  4. Self Care  5. Domestic Life   Number of elements that affect the Plan of Care: 1-2: LOW COMPLEXITY   CLINICAL PRESENTATION:   Presentation: Stable and uncomplicated: LOW COMPLEXITY   CLINICAL DECISION MAKIN [facility] [address] [id number] AM-PAC 6 Clicks   Basic Mobility Inpatient Short Form  How much difficulty does the patient currently have. .. Unable A Lot A Little None   1.   Turning over in bed (including adjusting bedclothes, sheets and blankets)? [ ] 1   [X] 2   [ ] 3   [ ] 4   2. Sitting down on and standing up from a chair with arms ( e.g., wheelchair, bedside commode, etc.)   [ ] 1   [X] 2   [ ] 3   [ ] 4   3. Moving from lying on back to sitting on the side of the bed? [ ] 1   [X] 2   [ ] 3   [ ] 4   How much help from another person does the patient currently need. .. Total A Lot A Little None   4. Moving to and from a bed to a chair (including a wheelchair)? [ ] 1   [X] 2   [ ] 3   [ ] 4   5. Need to walk in hospital room? [ ] 1   [X] 2   [ ] 3   [ ] 4   6. Climbing 3-5 steps with a railing? [X] 1   [ ] 2   [ ] 3   [ ] 4   © 2007, Trustees of [address] [place] [id number], under license to [employer]. All rights reserved    Score:  Initial: 11 Most Recent: X (Date: -- )     Interpretation of Tool:  Represents activities that are increasingly more difficult (i.e. Bed mobility, Transfers, Gait). Score 24 23 22-20 19-15 14-10 9-7 6       Modifier CH CI CJ CK CL CM CN         · Mobility - Walking and Moving Around:               - CURRENT STATUS:    CL - 60%-79% impaired, limited or restricted               - GOAL STATUS:           CK - 40%-59% impaired, limited or restricted               - D/C STATUS:                       ---------------To be determined---------------  [place] / Product Type: Medicare /       Medical Necessity:     · Skilled intervention continues to be required due to current poor functional mobility and likely worse than PLOF. Reason for Services/Other Comments:  · Patient continues to require skilled intervention due to likely higher PLOF living in assisted living memory care.    Use of outcome tool(s) and clinical judgement create a POC that gives a: Clear prediction of patient's progress: LOW COMPLEXITY                 TREATMENT:   (In addition to Assessment/Re-Assessment sessions the following treatments were rendered)   Pre-treatment Symptoms/Complaints:  weakness  Pain: Initial: visual scale  Pain Intensity 1: 0 (0/10 after therapy)  Post Session:     Therapeutic Activity: (  15 Minutes ):  Therapeutic activities including transfers, standing balance & short distance ambulation with rolling walker to improve mobility, strength, balance, coordination and dynamic movement of arm - bilateral, leg - bilateral and core to improve functional WB potential.  Therapeutic Exercise: (15 Minutes):  Exercises per grid below to improve strength. Required minimal verbal cues to promote proper body alignment. Progressed range as indicated. Date:  8/29 Date:   Date:     Activity/Exercise Parameters Parameters Parameters   Ankle pumps 12     Quad sets 12     heelslides 12     Hip abd/add 12                               Braces/Orthotics/Lines/Etc:   · IV  Treatment/Session Assessment:    · Response to Treatment:  Pt tolerated session well. · Interdisciplinary Collaboration:  · Registered Nurse  · After treatment position/precautions:  · Up in chair, Bed/Chair-wheels locked, Call light within reach and RN notified  · Compliance with Program/Exercises: Will assess as treatment progresses. · Recommendations/Intent for next treatment session: \"Next visit will focus on advancements to more challenging activities and reduction in assistance provided\".   Total Treatment Duration:  PT Patient Time In/Time Out  Time In: 1045  Time Out: Fernando Jimenez, MANI

## 2017-08-29 NOTE — PROGRESS NOTES
Hospitalist Progress Note     Admit Date:  2017  1:36 PM   Name:  Vickie Hillman   Age:  74 y.o.  :  1945   MRN:  [id number]   PCP:  Deb Rene MD  Treatment Team: Attending Provider: Mynor Dior MD; Primary Nurse: Bonnie Tolbert RN; Utilization Review: Lola Resendiz RN    Subjective:           Ms. Jess Marie is a 75 yo female with PMH of multiple myeloma, CVA, memory loss, recurrent UTI evaluated for progressive weakness and concern for recurrent UTI. Presently lives at [address] [place] [address]. Follows with [facility] neurology and had recent memory testing. Has taken several rounds of cipro without impovement. Last urine cx - with ECOLI and Klebsiella, sensitive to rocephin. Admitted for hydration and IV antibiotics, day 4 rocephin, cx enterococcus and klebsiella. Plans for SNF        more alert, no anorexia or constipation, no dyspnea, some  cough,            Objective:   Patient Vitals for the past 24 hrs:   Temp Pulse Resp BP SpO2   17 0824 - - - 132/86 -   17 0738 95.7 °F (35.4 °C) 84 18 176/85 99 %   17 0325 98.3 °F (36.8 °C) 82 18 (!) 159/93 98 %   17 2247 96.9 °F (36.1 °C) 74 18 156/72 98 %   17 1910 - 77 - - 100 %   17 1906 97.5 °F (36.4 °C) 88 19 170/83 (!) 88 %   17 1445 96 °F (35.6 °C) 70 17 134/62 96 %   17 1115 97.9 °F (36.6 °C) 74 20 151/74 97 %     Oxygen Therapy  O2 Sat (%): 99 % (17 0738)  Pulse via Oximetry: 70 beats per minute (17 1555)  O2 Device: Room air (17 1344)  No intake or output data in the 24 hours ending 17 1036      General:    Well nourished. Alert. CV:   RRR. No murmur, rub, or gallop. Lungs:   Clear to auscultation bilaterally. No wheezing, rhonchi, or rales. Abdomen:   Soft, nontender, nondistended. Extremities: Warm and dry. No cyanosis or edema. Skin:     No rashes or jaundice.      Data Review:  I have reviewed all labs, meds, telemetry events, and studies from the last 24 hours.     Recent Results (from the past 24 hour(s))   METABOLIC PANEL, BASIC    Collection Time: 08/29/17  6:24 AM   Result Value Ref Range    Sodium 142 136 - 145 mmol/L    Potassium 3.1 (L) 3.5 - 5.1 mmol/L    Chloride 106 98 - 107 mmol/L    CO2 31 21 - 32 mmol/L    Anion gap 5 (L) 7 - 16 mmol/L    Glucose 104 (H) 65 - 100 mg/dL    BUN 11 8 - 23 MG/DL    Creatinine 0.54 (L) 0.6 - 1.0 MG/DL    GFR est AA >60 >60 ml/min/1.73m2    GFR est non-AA >60 >60 ml/min/1.73m2    Calcium 8.0 (L) 8.3 - 10.4 MG/DL   MAGNESIUM    Collection Time: 08/29/17  6:24 AM   Result Value Ref Range    Magnesium 1.8 1.8 - 2.4 mg/dL        All Micro Results     Procedure Component Value Units Date/Time    CULTURE, URINE [[id number]]  (Abnormal)  (Susceptibility) Collected:  08/25/17 1441    Order Status:  Completed Specimen:  Urine from Clean catch Updated:  08/29/17 0646     Special Requests: NO SPECIAL REQUESTS        Culture result:         10,000 to 50,000 COLONIES/mL KLEBSIELLA PNEUMONIAE (A)              10,000 to 50,000 COLONIES/mL ENTEROCOCCUS FAECALIS GROUP D (A)    MRSA SCREEN - PCR (NASAL) [[id number]] Collected:  08/25/17 1819    Order Status:  Completed Specimen:  Nasal from Nares Updated:  08/25/17 2036     Special Requests: NO SPECIAL REQUESTS        Culture result:         MRSA target DNA is not detected (presumptive not colonized with MRSA)    CULTURE, URINE [[id number]] Collected:  08/25/17 1715    Order Status:  Canceled Specimen:  Urine from Clean catch           Current Meds:  Current Facility-Administered Medications   Medication Dose Route Frequency    potassium chloride (KAON 10%) 20 mEq/15 mL oral liquid 40 mEq  40 mEq Oral NOW    ampicillin (PRINCIPEN) capsule 500 mg  500 mg Oral Q8H    ciprofloxacin HCl (CIPRO) tablet 500 mg  500 mg Oral Q12H    magnesium oxide (MAG-OX) tablet 400 mg  400 mg Oral BID    clopidogrel (PLAVIX) tablet 75 mg  75 mg Oral DAILY    clorazepate (TRANXENE) tablet 3.75 mg 3.75 mg Oral DAILY    colestipol (COLESTID) tablet 1 g  1 g Oral BID    cyanocobalamin tablet 1,000 mcg  1,000 mcg Oral DAILY    dexamethasone (DECADRON) tablet 4 mg  4 mg Oral every Monday    pantoprazole (PROTONIX) tablet 40 mg  40 mg Oral ACB    FLUoxetine (PROzac) capsule 20 mg  20 mg Oral DAILY    pregabalin (LYRICA) capsule 100 mg  100 mg Oral BID    pravastatin (PRAVACHOL) tablet 40 mg  40 mg Oral QHS    sodium chloride (NS) flush 5-10 mL  5-10 mL IntraVENous Q8H    sodium chloride (NS) flush 5-10 mL  5-10 mL IntraVENous PRN    acetaminophen (TYLENOL) tablet 650 mg  650 mg Oral Q6H PRN    ondansetron (ZOFRAN) injection 4 mg  4 mg IntraVENous Q4H PRN    enoxaparin (LOVENOX) injection 40 mg  40 mg SubCUTAneous Q24H       Other Studies (last 24 hours):  No results found.     Assessment and Plan:     Hospital Problems as of 8/29/2017  Date Reviewed: 7/31/2017          Codes Class Noted - Resolved POA    * (Principal)UTI (urinary tract infection) ICD-10-CM: N39.0  ICD-9-CM: 599.0  8/25/2017 - Present Yes        Cerebrovascular accident (CVA) ([facility] ICD-10-CM: I63.9  ICD-9-CM: 434.91  3/1/2017 - Present Yes    Overview Signed 6/16/2017 11:10 AM by Fabienne Figueredo LPN     Overview:   Etiology unclear without results of tests done    Last Assessment & Plan:   Needs f/u PRN             Hypokalemia ICD-10-CM: E87.6  ICD-9-CM: 276.8  9/30/2016 - Present Yes        Drug-induced polyneuropathy ([facility] ICD-10-CM: G62.0  ICD-9-CM: 357.6, E980.5  9/30/2016 - Present Yes        IgG multiple myeloma ([facility] ICD-10-CM: C90.00  ICD-9-CM: 203.00  1/20/2016 - Present Yes    Overview Signed 1/20/2016 10:02 AM by Akash Wright MD     kappa-prior stem cell transplant-M spike 0.2 12/2015                   PLAN:    · Change to cipro and ampicillin for antibiotic course   · Supplement potassium   · PT/OT/SW for dispo     DC planning/Dispo:  Pending   DVT ppx:  dea    Signed:  Angelica Reeves MD

## 2017-08-30 PROCEDURE — 65270000029 HC RM PRIVATE

## 2017-08-30 PROCEDURE — 97530 THERAPEUTIC ACTIVITIES: CPT

## 2017-08-30 PROCEDURE — 74011250637 HC RX REV CODE- 250/637: Performed by: INTERNAL MEDICINE

## 2017-08-30 PROCEDURE — 97110 THERAPEUTIC EXERCISES: CPT

## 2017-08-30 PROCEDURE — 74011250637 HC RX REV CODE- 250/637: Performed by: HOSPITALIST

## 2017-08-30 PROCEDURE — 74011250636 HC RX REV CODE- 250/636: Performed by: INTERNAL MEDICINE

## 2017-08-30 RX ADMIN — AMPICILLIN 500 MG: 500 CAPSULE ORAL at 19:35

## 2017-08-30 RX ADMIN — AMPICILLIN 500 MG: 500 CAPSULE ORAL at 11:58

## 2017-08-30 RX ADMIN — PREGABALIN 100 MG: 50 CAPSULE ORAL at 21:59

## 2017-08-30 RX ADMIN — CLOPIDOGREL BISULFATE 75 MG: 75 TABLET ORAL at 08:14

## 2017-08-30 RX ADMIN — PREGABALIN 100 MG: 50 CAPSULE ORAL at 08:14

## 2017-08-30 RX ADMIN — ENOXAPARIN SODIUM 40 MG: 40 INJECTION SUBCUTANEOUS at 08:14

## 2017-08-30 RX ADMIN — Medication 5 ML: at 14:00

## 2017-08-30 RX ADMIN — Medication 400 MG: at 17:35

## 2017-08-30 RX ADMIN — CIPROFLOXACIN HYDROCHLORIDE 500 MG: 500 TABLET, FILM COATED ORAL at 08:14

## 2017-08-30 RX ADMIN — Medication 400 MG: at 08:14

## 2017-08-30 RX ADMIN — AMPICILLIN 500 MG: 500 CAPSULE ORAL at 03:25

## 2017-08-30 RX ADMIN — PRAVASTATIN SODIUM 40 MG: 20 TABLET ORAL at 21:59

## 2017-08-30 RX ADMIN — CIPROFLOXACIN HYDROCHLORIDE 500 MG: 500 TABLET, FILM COATED ORAL at 21:59

## 2017-08-30 RX ADMIN — CLORAZEPATE DIPOTASSIUM 3.75 MG: 7.5 TABLET ORAL at 08:14

## 2017-08-30 RX ADMIN — PANTOPRAZOLE SODIUM 40 MG: 40 TABLET, DELAYED RELEASE ORAL at 08:14

## 2017-08-30 RX ADMIN — FLUOXETINE 20 MG: 20 CAPSULE ORAL at 08:14

## 2017-08-30 RX ADMIN — CYANOCOBALAMIN TAB 1000 MCG 1000 MCG: 1000 TAB at 08:14

## 2017-08-30 NOTE — PROGRESS NOTES
Problem: Self Care Deficits Care Plan (Adult)  Goal: *Acute Goals and Plan of Care (Insert Text)  1. Patient will perform grooming with supervision. 2. Patient will perform Upper body dressing with CGA assistance  3. Patient will perform lower body dressing with minimal assistance  4. Patient will perform upper and lower body bathing with minimal assistance. 5. Patient will perform toilet transfers with CGA assistance  6. Patient will perform shower transfer with minimal assistance  7. Patient will participate in 30 + minutes of ADL/ therapeutic exercise/therapeutic activity with min rest breaks to increase activity tolerance for self care. 8. Patient will perform ADL functional mobility in room with CGA. Goals to be achieved in 7 days. OCCUPATIONAL THERAPY: Daily Note, Treatment Day: 2nd and AM 8/30/2017  INPATIENT: Hospital Day: 6  [place] / Product Type: Medicare /      NAME/AGE/GENDER: Nazia Tyler is a 74 y.o. female    PRIMARY DIAGNOSIS:  UTI (urinary tract infection)  UTI (urinary tract infection) UTI (urinary tract infection) UTI (urinary tract infection)        ICD-10: Treatment Diagnosis:        · Other lack of cordination (R27.8)   Precautions/Allergies:         Review of patient's allergies indicates no known allergies. ASSESSMENT:      Ms. Ricki King presents with secondary concerns regarding functional mobility and self-care independence due to s/p UTI. Pt required the assistance listed below for bed mobility and functional  transfer. Pt was not following commands. Pt required verbal and tactile cues for functional transfer. Skilled OT services are indicated and required at this time to continue to assist self-care performance and functional mobility. This section established at most recent assessment   PROBLEM LIST (Impairments causing functional limitations):  1. Decreased Strength  2. Decreased ADL/Functional Activities  3.  Decreased Balance  4. Decreased Activity Tolerance  5. Decreased Cognition    INTERVENTIONS PLANNED: (Benefits and precautions of occupational therapy have been discussed with the patient.)  1. Activities of daily living training  2. Therapeutic activity  3. Therapeutic exercise  4. Self-care and functional mobility training      TREATMENT PLAN: Frequency/Duration: Follow patient 1x to address above goals. Rehabilitation Potential For Stated Goals: GOOD      RECOMMENDED REHABILITATION/EQUIPMENT: (at time of discharge pending progress): Continue Skilled Therapy. OCCUPATIONAL PROFILE AND HISTORY:   History of Present Injury/Illness (Reason for Referral):  UTI; gait mobility, and decreased self cares and functional mobility  Past Medical History/Comorbidities:   Ms. Negrita Chan  has a past medical history of Anemia; Anxiety; Back ache; Benign paroxysmal positional vertigo; Cancer ([address] Contact dermatitis and other eczema, due to unspecified cause; Disease of esophagus; DVT (deep venous thrombosis) ([address] (); Dyslipidemia; Edema; Esophageal reflux; FHx: malignant neoplasm of gastrointestinal tract; GERD (gastroesophageal reflux disease); Herpes zoster; History of autologous stem cell transplant ([address] (10/2013); HLD (hyperlipidemia); Hypercholesterolemia; Multiple myeloma ([address] (); Neuropathy ([address] Peripheral neuropathy ([address] Primary hypercoagulable state ([address] Rosacea; Rosacea; Shingles; Shingles (); and Stroke [facility]). She also has no past medical history of Chronic kidney disease. Ms. Negrita Chan  has a past surgical history that includes  section; tubal ligation; colonoscopy (); other surgical (3/06); endoscopy (3/08); heent (2014); and cholecystectomy (2016).   Social History/Living Environment:   Home Environment: Other (comment) (memory care; assisted living)  # Steps to Enter: 0  One/Two Story Residence: One story  Living Alone: No  Support Systems: Long term acute care  Patient Expects to be Discharged to[de-identified] Skilled nursing facility  Current DME Used/Available at Home: Ar[employer] Medina  Prior Level of Function/Work/Activity:  Ms. Stella Rodney was a poor historian, however, reports she requires assistance with all ADLs      Number of Personal Factors/Comorbidities that affect the Plan of Care: Brief history (0):  LOW COMPLEXITY   ASSESSMENT OF OCCUPATIONAL PERFORMANCE[de-identified]   Activities of Daily Living:         Assistance required due to cognition and strength  Basic ADLs (From Assessment) Complex ADLs (From Assessment)   Basic ADL  Feeding: Setup  Oral Facial Hygiene/Grooming: Setup  Bathing: Maximum assistance  Upper Body Dressing: Maximum assistance  Lower Body Dressing: Maximum assistance  Toileting: Total assistance Instrumental ADL  Meal Preparation: Total assistance  Homemaking:  Total assistance  Medication Management: Total assistance  Financial Management: Total assistance   Grooming/Bathing/Dressing Activities of Daily Living     Cognitive Retraining  Safety/Judgement: Fall prevention                       Bed/Mat Mobility  Supine to Sit: Maximum assistance  Sit to Supine: Minimum assistance  Sit to Stand: Minimum assistance  Bed to Chair: Minimum assistance  Scooting: Minimum assistance          Most Recent Physical Functioning:   Gross Assessment:                  Posture:     Balance:  Sitting: Impaired  Sitting - Static: Fair (occasional)  Standing: Impaired Bed Mobility:  Supine to Sit: Maximum assistance  Sit to Supine: Minimum assistance  Scooting: Minimum assistance  Wheelchair Mobility:     Transfers:  Sit to Stand: Minimum assistance  Stand to Sit: Minimum assistance  Bed to Chair: Minimum assistance  Duration: 15 Minutes      Activities of Daily Living:         Assistance required  Basic ADLs (From Assessment) Complex ADLs (From Assessment)   Basic ADL  Feeding: Setup  Oral Facial Hygiene/Grooming: Setup  Bathing: Maximum assistance  Upper Body Dressing: Maximum assistance  Lower Body Dressing: Maximum assistance  Toileting: Total assistance Instrumental ADL  Meal Preparation: Total assistance  Homemaking: Total assistance  Medication Management: Total assistance  Financial Management: Total assistance   Grooming/Bathing/Dressing Activities of Daily Living     Cognitive Retraining  Safety/Judgement: Fall prevention                       Bed mobility: max assistance  Sit to stand: min assistance   Chair to bed: min assistance                      Patient Vitals for the past 6 hrs:   BP BP Patient Position SpO2 Pulse   17 0741 (!) 137/92 At rest 95 % 84        Mental Status  Neurologic State: Alert  Orientation Level: Disoriented X4  Cognition: Follows commands  Perception: Tactile, Verbal  Perseveration: Verbal cues provided, Tactile cues provided  Safety/Judgement: Fall prevention                               Physical Skills Involved:  1. Balance  2. Strength  3. Activity Tolerance Cognitive Skills Affected (resulting in the inability to perform in a timely and safe manner):  1. Executive Function  2. Sustained Attention Psychosocial Skills Affected:  1. Habits/Routines  2. Self-Awareness   Number of elements that affect the Plan of Care: 5+:  HIGH COMPLEXITY   CLINICAL DECISION MAKIN [facility] [address] [address] AM-PAC 6 Clicks   Daily Activity Inpatient Short Form  How much help from another person does the patient currently need. .. Total A Lot A Little None   1. Putting on and taking off regular lower body clothing?   [ ] 1   [ ] 2   [X] 3   [ ] 4   2. Bathing (including washing, rinsing, drying)? [ ] 1   [X] 2   [ ] 3   [ ] 4   3. Toileting, which includes using toilet, bedpan or urinal?   [ ] 1   [X] 2   [ ] 3   [ ] 4   4. Putting on and taking off regular upper body clothing?   [ ] 1   [ ] 2   [X] 3   [ ] 4   5. Taking care of personal grooming such as brushing teeth? [ ] 1   [ ] 2   [ ] 3   [X] 4   6. Eating meals?    [ ] 1   [ ] 2   [ ] 3   [X] 4   © 2007, Trustees of [address] [place] [address] [address] [address], under license to [employer]. All rights reserved    Score:  Initial: 18 Most Recent: X (Date: -- )     Interpretation of Tool:  Represents activities that are increasingly more difficult (i.e. Bed mobility, Transfers, Gait). Score 24 23 22-20 19-15 14-10 9-7 6       Modifier CH CI CJ CK CL CM CN         · Self Care:               - CURRENT STATUS:    CK - 40%-59% impaired, limited or restricted               - GOAL STATUS:           CJ - 20%-39% impaired, limited or restricted               - D/C STATUS:                       ---------------To be determined---------------  [place] / Product Type: Medicare /       Medical Necessity:     · Patient is expected to demonstrate progress in balance, coordination and functional technique to decrease assistance required with self care and functional mobility and improve safety during self care and functional mobility. Reason for Services/Other Comments:  · Patient continues to require skilled intervention due to decreased self care and functional mobility. Use of outcome tool(s) and clinical judgement create a POC that gives a: LOW COMPLEXITY             TREATMENT:   (In addition to Assessment/Re-Assessment sessions the following treatments were rendered)      Pre-treatment Symptoms/Complaints:  Cooperative, no pain reported  Pain: Initial:   Pain Intensity 1: 0 0/10 Post Session:  0/10      Therapeutic Activity: (  15 ):  Therapeutic activities including bed mobility and functional transfer to improve mobility and strength. Required   to promote motor control of bilateral, upper extremity(s), lower extremity(s).               Braces/Orthotics/Lines/Etc:   · IV  Treatment/Session Assessment:    · Response to Treatment:  Responded well, tolerated therapy  · Interdisciplinary Collaboration:  · Physical Therapy Assistant, Certified Occupational Therapy Assistant and Registered Nurse  · After treatment position/precautions:  · Up in chair, Bed alarm/tab alert on, Bed/Chair-wheels locked, Call light within reach, RN notified and Family at bedside  · Compliance with Program/Exercises: compliant all of the time. · Recommendations/Intent for next treatment session: \"Next visit will focus on self care and functional mobility\".   Total Treatment Duration:  minutesOT Patient Time In/Time Out  Time In: 1145  Time Out: [address]

## 2017-08-30 NOTE — PROGRESS NOTES
Problem: Mobility Impaired (Adult and Pediatric)  Goal: *Acute Goals and Plan of Care (Insert Text)  STG:  (1.)Ms. Raheem Whipple will move from supine to sit and sit to supine with MOD ASSIST within 3 day(s). Met 8/28  (2.)Ms. Raheem Whipple will transfer from bed to chair and chair to bed with MINIMAL ASSIST using the least restrictive device within 3 day(s). Met 8/28    LTG:  (1.)Ms. Raheem Whipple will move from supine to sit and sit to supine in bed with MINIMAL ASSIST within 7 day(s). (2.)Ms. Raheem Whipple will transfer from bed to chair and chair to bed with CONTACT GUARD ASSIST using the least restrictive device within 7 day(s). (3.)Ms. Raheem Whipple will ambulate with CONTACT GUARD ASSIST for 150 feet with the least restrictive device within 7 day(s). ________________________________________________________________________________________________      PHYSICAL THERAPY: Daily Note, Treatment Day: 5th and AM 8/30/2017  INPATIENT: Hospital Day: 6  [place] / Product Type: Medicare /      NAME/AGE/GENDER: Carisa Hutton is a 74 y.o. female    PRIMARY DIAGNOSIS: UTI (urinary tract infection)  UTI (urinary tract infection) UTI (urinary tract infection) UTI (urinary tract infection)        ICD-10: Treatment Diagnosis:       · Generalized Muscle Weakness (M62.81)  · Difficulty in walking, Not elsewhere classified (R26.2)   Precaution/Allergies:  Review of patient's allergies indicates no known allergies. ASSESSMENT:      Ms. Raheem Whipple is supine upon arrival.  She performs exercises in the bed without any problems. She practice bed mobility such as rolling from left to right to get clean up x 4. She has a hard time following commands for gait sequence. When she takes steps to [facility]  its hard to advance her right leg. She return to supine with call light near. She is still confused this morning.   This section established at most recent assessment   PROBLEM LIST (Impairments causing functional limitations):  1. Decreased Strength  2. Decreased Transfer Abilities  3. Decreased Ambulation Ability/Technique  4. Decreased Balance  5. Decreased Activity Tolerance  6. Decreased Cognition    INTERVENTIONS PLANNED: (Benefits and precautions of physical therapy have been discussed with the patient.)  1. Balance Exercise  2. Bed Mobility  3. Family Education  4. Gait Training  5. Home Exercise Program (HEP)  6. Manual Therapy  7. Neuromuscular Re-education/Strengthening  8. Therapeutic Activites  9. Therapeutic Exercise/Strengthening  10. Group Therapy      TREATMENT PLAN: Frequency/Duration: daily for duration of hospital stay  Rehabilitation Potential For Stated Goals: GOOD      RECOMMENDED REHABILITATION/EQUIPMENT: (at time of discharge pending progress): Rehab. HISTORY:   History of Present Injury/Illness (Reason for Referral):  Deconditioning secondary to hospitalization  Taken from H&P:Ms. Mike Mena is a 75 yo female with PMH of multiple myeloma, CVA, memory loss, recurrent UTI evaluated for progressive weakness and concern for recurrent UTI. Presently lives at [address] [place] [address]. Follows with [facility] neurology and had recent memory testing. Past Medical History/Comorbidities:   Ms. Mike Mena  has a past medical history of Anemia; Anxiety; Back ache; Benign paroxysmal positional vertigo; Cancer ([address] Contact dermatitis and other eczema, due to unspecified cause; Disease of esophagus; DVT (deep venous thrombosis) ([address] (2002); Dyslipidemia; Edema; Esophageal reflux; FHx: malignant neoplasm of gastrointestinal tract; GERD (gastroesophageal reflux disease); Herpes zoster; History of autologous stem cell transplant ([address] (10/2013); HLD (hyperlipidemia); Hypercholesterolemia; Multiple myeloma ([address] (1990); Neuropathy ([address] Peripheral neuropathy ([address] Primary hypercoagulable state ([address] Rosacea; Rosacea; Shingles; Shingles (2008); and Stroke [facility]).  She also has no past medical history of Chronic kidney disease. Ms. Lieutenant Bhandari  has a past surgical history that includes  section; tubal ligation; colonoscopy (); other surgical (3/06); endoscopy (3/08); heent (2014); and cholecystectomy (2016). Social History/Living Environment:   Home Environment: Other (comment) (memory care; assisted living)  # Steps to Enter: 0  One/Two Story Residence: One story  Living Alone: No  Support Systems: Long term acute care  Patient Expects to be Discharged to[de-identified] Skilled nursing facility  Current DME Used/Available at Home: Walker  Prior Level of Function/Work/Activity:  Unable to determine. Pt poor historian. Family not present  Personal Factors:          Sex:  female        Age:  74 y.o. Number of Personal Factors/Comorbidities that affect the Plan of Care: 1-2: MODERATE COMPLEXITY   EXAMINATION:   Most Recent Physical Functioning:   Gross Assessment:                       Balance:    Bed Mobility:  Supine to Sit: Minimum assistance  Sit to Supine: Minimum assistance  Scooting: Minimum assistance       Transfers:  Sit to Stand: Minimum assistance  Stand to Sit: Minimum assistance  Bed to Chair: Minimum assistance  Duration: 15 Minutes  Gait:     Gait Abnormalities: Decreased step clearance  Distance (ft):  (side stepping)  Assistive Device: Walker, rolling  Ambulation - Level of Assistance: Minimal assistance      Body Structures Involved:  1. Nerves  2. Joints  3. Muscles  4. Ligaments Body Functions Affected:  1. Mental  2. Sensory/Pain  3. Neuromusculoskeletal  4. Movement Related Activities and Participation Affected:  1. Learning and Applying Knowledge  2. General Tasks and Demands  3. Mobility  4. Self Care  5.  Domestic Life   Number of elements that affect the Plan of Care: 1-2: LOW COMPLEXITY   CLINICAL PRESENTATION:   Presentation: Stable and uncomplicated: LOW COMPLEXITY   CLINICAL DECISION MAKIN [facility] [address] [address] AM-PAC 6 Clicks   Basic Mobility Inpatient Short Form  How much difficulty does the patient currently have. .. Unable A Lot A Little None   1. Turning over in bed (including adjusting bedclothes, sheets and blankets)? [ ] 1   [X] 2   [ ] 3   [ ] 4   2. Sitting down on and standing up from a chair with arms ( e.g., wheelchair, bedside commode, etc.)   [ ] 1   [X] 2   [ ] 3   [ ] 4   3. Moving from lying on back to sitting on the side of the bed? [ ] 1   [X] 2   [ ] 3   [ ] 4   How much help from another person does the patient currently need. .. Total A Lot A Little None   4. Moving to and from a bed to a chair (including a wheelchair)? [ ] 1   [X] 2   [ ] 3   [ ] 4   5. Need to walk in hospital room? [ ] 1   [X] 2   [ ] 3   [ ] 4   6. Climbing 3-5 steps with a railing? [X] 1   [ ] 2   [ ] 3   [ ] 4   © 2007, Trustees of [address] [place] [address], under license to [employer]. All rights reserved    Score:  Initial: 11 Most Recent: X (Date: -- )     Interpretation of Tool:  Represents activities that are increasingly more difficult (i.e. Bed mobility, Transfers, Gait). Score 24 23 22-20 19-15 14-10 9-7 6       Modifier CH CI CJ CK CL CM CN         · Mobility - Walking and Moving Around:               - CURRENT STATUS:    CL - 60%-79% impaired, limited or restricted               - GOAL STATUS:           CK - 40%-59% impaired, limited or restricted               - D/C STATUS:                       ---------------To be determined---------------  [place] / Product Type: Medicare /       Medical Necessity:     · Skilled intervention continues to be required due to current poor functional mobility and likely worse than PLOF. Reason for Services/Other Comments:  · Patient continues to require skilled intervention due to likely higher PLOF living in assisted living memory care.    Use of outcome tool(s) and clinical judgement create a POC that gives a: Clear prediction of patient's progress: LOW COMPLEXITY TREATMENT:   (In addition to Assessment/Re-Assessment sessions the following treatments were rendered)   Pre-treatment Symptoms/Complaints:  weakness  Pain: Initial: visual scale  Pain Intensity 1: 0 (0/10 after therapy)  Post Session:     Therapeutic Activity: (  15 Minutes ):  Therapeutic activities including transfers, standing balance & short distance ambulation with rolling walker to improve mobility, strength, balance, coordination and dynamic movement of arm - bilateral, leg - bilateral and core to improve functional WB potential.  Therapeutic Exercise: (15 Minutes):  Exercises per grid below to improve strength. Required minimal verbal cues to promote proper body alignment. Progressed range as indicated. Date:  8/29 Date:  8/30   Date:     Activity/Exercise Parameters Parameters Parameters   Ankle pumps 12 12    Quad sets 12 12    heelslides 12 12    Hip abd/add 12 12    SLR  12                        Braces/Orthotics/Lines/Etc:   · IV  Treatment/Session Assessment:    · Response to Treatment:  Pt tolerated session well. · Interdisciplinary Collaboration:  · Registered Nurse  · After treatment position/precautions:  · Supine in bed, Bed/Chair-wheels locked, Call light within reach and RN notified  · Compliance with Program/Exercises: Will assess as treatment progresses. · Recommendations/Intent for next treatment session: \"Next visit will focus on advancements to more challenging activities and reduction in assistance provided\".   Total Treatment Duration:  PT Patient Time In/Time Out  Time In: 9177  Time Out: [address] [facility] MANI Jimenez

## 2017-08-30 NOTE — PROGRESS NOTES
Problem: Interdisciplinary Rounds  Goal: Interdisciplinary Rounds  Interdisciplinary team rounds were held 8/30/2017 with the following team members:Care Management, Nursing, Nutrition, Pharmacy and Physician. Plan of care discussed. See clinical pathway and/or care plan for interventions and desired outcomes.

## 2017-08-30 NOTE — PROGRESS NOTES
Pt states she is scared but doesn't know why. Pt being feed by staff. Reassured pt. Pt watching tv and watching about all the flooding in [place] asked her if that's what was bothering her and she says she doesn't know.

## 2017-08-30 NOTE — PROGRESS NOTES
Hospitalist Progress Note     Admit Date:  2017  1:36 PM   Name:  Catherine Zelaya   Age:  74 y.o.  :  1945   MRN:  [id number]   PCP:  Brittany Ahmadi MD  Treatment Team: Attending Provider: Marques Patel MD; Primary Nurse: Aletha Soto RN; Utilization Review: Kilo Azevedo RN    Subjective:           Ms. Opal Young is a 75 yo female with PMH of multiple myeloma, CVA, memory loss, recurrent UTI evaluated for progressive weakness and concern for recurrent UTI. Presently lives at [address] [place] [address]. Follows with [facility] neurology and had recent memory testing. Has taken several rounds of cipro without impovement. Last urine cx 17 with ECOLI and Klebsiella, sensitive to rocephin. Admitted for hydration and IV antibiotics,completed day 4 rocephin, cx enterococcus and klebsiella and placed on oral antibiotics. Plans for SNF       8- overall feels poorly and nonspecific, no anorexia, no pain just tired             Objective:     Patient Vitals for the past 24 hrs:   Temp Pulse Resp BP SpO2   17 0741 97.1 °F (36.2 °C) 84 17 (!) 137/92 95 %   17 0300 98.4 °F (36.9 °C) 96 16 158/82 96 %   17 2325 97.5 °F (36.4 °C) 82 16 147/76 97 %   17 1913 96.1 °F (35.6 °C) 77 16 147/68 95 %   17 1546 97.2 °F (36.2 °C) 73 18 133/52 98 %   17 1116 95.2 °F (35.1 °C) 81 18 162/77 100 %     Oxygen Therapy  O2 Sat (%): 95 % (17 0741)  Pulse via Oximetry: 70 beats per minute (17 1555)  O2 Device: Room air (17 1344)  No intake or output data in the 24 hours ending 17 0952      General:    Well nourished. Alert. CV:   RRR. No murmur, rub, or gallop. Lungs:   Clear to auscultation bilaterally. No wheezing, rhonchi, or rales. Anterior exam  Abdomen:   Soft, nontender, nondistended. Extremities: Warm and dry. No cyanosis or edema. Skin:     No rashes or jaundice.      Data Review:  I have reviewed all labs, meds, telemetry events, and studies from the last 24 hours. No results found for this or any previous visit (from the past 24 hour(s)).      All Micro Results     Procedure Component Value Units Date/Time    CULTURE, URINE [[id number]]  (Abnormal)  (Susceptibility) Collected:  08/25/17 1441    Order Status:  Completed Specimen:  Urine from Clean catch Updated:  08/29/17 0646     Special Requests: NO SPECIAL REQUESTS        Culture result:         10,000 to 50,000 COLONIES/mL KLEBSIELLA PNEUMONIAE (A)              10,000 to 50,000 COLONIES/mL ENTEROCOCCUS FAECALIS GROUP D (A)    MRSA SCREEN - PCR (NASAL) [[id number]] Collected:  08/25/17 1819    Order Status:  Completed Specimen:  Nasal from Nares Updated:  08/25/17 2036     Special Requests: NO SPECIAL REQUESTS        Culture result:         MRSA target DNA is not detected (presumptive not colonized with MRSA)    CULTURE, URINE [[id number]] Collected:  08/25/17 1715    Order Status:  Canceled Specimen:  Urine from Clean catch           Current Meds:  Current Facility-Administered Medications   Medication Dose Route Frequency    ampicillin (PRINCIPEN) capsule 500 mg  500 mg Oral Q8H    ciprofloxacin HCl (CIPRO) tablet 500 mg  500 mg Oral Q12H    magnesium oxide (MAG-OX) tablet 400 mg  400 mg Oral BID    clopidogrel (PLAVIX) tablet 75 mg  75 mg Oral DAILY    clorazepate (TRANXENE) tablet 3.75 mg  3.75 mg Oral DAILY    colestipol (COLESTID) tablet 1 g  1 g Oral BID    cyanocobalamin tablet 1,000 mcg  1,000 mcg Oral DAILY    dexamethasone (DECADRON) tablet 4 mg  4 mg Oral every Monday    pantoprazole (PROTONIX) tablet 40 mg  40 mg Oral ACB    FLUoxetine (PROzac) capsule 20 mg  20 mg Oral DAILY    pregabalin (LYRICA) capsule 100 mg  100 mg Oral BID    pravastatin (PRAVACHOL) tablet 40 mg  40 mg Oral QHS    sodium chloride (NS) flush 5-10 mL  5-10 mL IntraVENous Q8H    sodium chloride (NS) flush 5-10 mL  5-10 mL IntraVENous PRN    acetaminophen (TYLENOL) tablet 650 mg  650 mg Oral Q6H PRN    ondansetron (ZOFRAN) injection 4 mg  4 mg IntraVENous Q4H PRN    enoxaparin (LOVENOX) injection 40 mg  40 mg SubCUTAneous Q24H       Other Studies (last 24 hours):  No results found.     Assessment and Plan:     Hospital Problems as of 8/30/2017  Date Reviewed: 7/31/2017          Codes Class Noted - Resolved POA    * (Principal)UTI (urinary tract infection) ICD-10-CM: N39.0  ICD-9-CM: 599.0  8/25/2017 - Present Yes        Cerebrovascular accident (CVA) ([facility] ICD-10-CM: I63.9  ICD-9-CM: 434.91  3/1/2017 - Present Yes    Overview Signed 6/16/2017 11:10 AM by Raven Villalobos LPN     Overview:   Etiology unclear without results of tests done    Last Assessment & Plan:   Needs f/u PRN             Hypokalemia ICD-10-CM: E87.6  ICD-9-CM: 276.8  9/30/2016 - Present Yes        Drug-induced polyneuropathy ([facility] ICD-10-CM: G62.0  ICD-9-CM: 357.6, E980.5  9/30/2016 - Present Yes        IgG multiple myeloma ([facility] ICD-10-CM: C90.00  ICD-9-CM: 203.00  1/20/2016 - Present Yes    Overview Signed 1/20/2016 10:02 AM by Jeremie Reeves MD     kappa-prior stem cell transplant-M spike 0.2 12/2015                   PLAN:    · Changed to cipro day 6/7 and ampicillin day 2/7  for antibiotic course   · PT/OT/SW for dispo , SNF tomorrow     DC planning/Dispo:  Pending   DVT ppx:  lovenox    Signed:  Francia Pizarro MD

## 2017-08-30 NOTE — PROGRESS NOTES
Feed pt her breakfast as she is unable to follow any commands and almost spills orange juice when trying to drink

## 2017-08-31 VITALS
BODY MASS INDEX: 26.68 KG/M2 | TEMPERATURE: 97 F | OXYGEN SATURATION: 99 % | HEART RATE: 83 BPM | SYSTOLIC BLOOD PRESSURE: 128 MMHG | WEIGHT: 145 LBS | DIASTOLIC BLOOD PRESSURE: 68 MMHG | HEIGHT: 62 IN | RESPIRATION RATE: 18 BRPM

## 2017-08-31 LAB
ANION GAP SERPL CALC-SCNC: 3 MMOL/L (ref 7–16)
BASOPHILS # BLD: 0 K/UL (ref 0–0.2)
BASOPHILS NFR BLD: 1 % (ref 0–2)
BUN SERPL-MCNC: 18 MG/DL (ref 8–23)
CALCIUM SERPL-MCNC: 8.5 MG/DL (ref 8.3–10.4)
CHLORIDE SERPL-SCNC: 102 MMOL/L (ref 98–107)
CO2 SERPL-SCNC: 34 MMOL/L (ref 21–32)
CREAT SERPL-MCNC: 0.7 MG/DL (ref 0.6–1)
DIFFERENTIAL METHOD BLD: ABNORMAL
EOSINOPHIL # BLD: 0.1 K/UL (ref 0–0.8)
EOSINOPHIL NFR BLD: 3 % (ref 0.5–7.8)
ERYTHROCYTE [DISTWIDTH] IN BLOOD BY AUTOMATED COUNT: 15.9 % (ref 11.9–14.6)
GLUCOSE SERPL-MCNC: 94 MG/DL (ref 65–100)
HCT VFR BLD AUTO: 32.4 % (ref 35.8–46.3)
HGB BLD-MCNC: 10.7 G/DL (ref 11.7–15.4)
IMM GRANULOCYTES # BLD: 0 K/UL (ref 0–0.5)
IMM GRANULOCYTES NFR BLD: 0.5 % (ref 0–5)
LYMPHOCYTES # BLD: 1.1 K/UL (ref 0.5–4.6)
LYMPHOCYTES NFR BLD: 26 % (ref 13–44)
MAGNESIUM SERPL-MCNC: 1.9 MG/DL (ref 1.8–2.4)
MCH RBC QN AUTO: 35 PG (ref 26.1–32.9)
MCHC RBC AUTO-ENTMCNC: 33 G/DL (ref 31.4–35)
MCV RBC AUTO: 105.9 FL (ref 79.6–97.8)
MONOCYTES # BLD: 0.4 K/UL (ref 0.1–1.3)
MONOCYTES NFR BLD: 10 % (ref 4–12)
NEUTS SEG # BLD: 2.5 K/UL (ref 1.7–8.2)
NEUTS SEG NFR BLD: 60 % (ref 43–78)
PLATELET # BLD AUTO: 129 K/UL (ref 150–450)
PMV BLD AUTO: 11.6 FL (ref 10.8–14.1)
POTASSIUM SERPL-SCNC: 3.6 MMOL/L (ref 3.5–5.1)
RBC # BLD AUTO: 3.06 M/UL (ref 4.05–5.25)
SODIUM SERPL-SCNC: 139 MMOL/L (ref 136–145)
WBC # BLD AUTO: 4.2 K/UL (ref 4.3–11.1)

## 2017-08-31 PROCEDURE — 74011250636 HC RX REV CODE- 250/636: Performed by: INTERNAL MEDICINE

## 2017-08-31 PROCEDURE — 36415 COLL VENOUS BLD VENIPUNCTURE: CPT | Performed by: INTERNAL MEDICINE

## 2017-08-31 PROCEDURE — 74011250637 HC RX REV CODE- 250/637: Performed by: INTERNAL MEDICINE

## 2017-08-31 PROCEDURE — 74011250637 HC RX REV CODE- 250/637: Performed by: HOSPITALIST

## 2017-08-31 PROCEDURE — 85025 COMPLETE CBC W/AUTO DIFF WBC: CPT | Performed by: INTERNAL MEDICINE

## 2017-08-31 PROCEDURE — 97530 THERAPEUTIC ACTIVITIES: CPT

## 2017-08-31 PROCEDURE — 80048 BASIC METABOLIC PNL TOTAL CA: CPT | Performed by: INTERNAL MEDICINE

## 2017-08-31 PROCEDURE — 83735 ASSAY OF MAGNESIUM: CPT | Performed by: INTERNAL MEDICINE

## 2017-08-31 PROCEDURE — 97110 THERAPEUTIC EXERCISES: CPT

## 2017-08-31 RX ORDER — AMPICILLIN 500 MG/1
500 CAPSULE ORAL EVERY 8 HOURS
Qty: 18 CAP | Refills: 0 | Status: SHIPPED
Start: 2017-08-31 | End: 2017-09-06

## 2017-08-31 RX ORDER — LANOLIN ALCOHOL/MO/W.PET/CERES
400 CREAM (GRAM) TOPICAL 2 TIMES DAILY
Qty: 30 TAB | Refills: 0 | Status: SHIPPED
Start: 2017-08-31 | End: 2018-06-08

## 2017-08-31 RX ORDER — CIPROFLOXACIN 500 MG/1
500 TABLET ORAL EVERY 12 HOURS
Qty: 1 TAB | Refills: 0 | Status: SHIPPED
Start: 2017-08-31 | End: 2017-09-01

## 2017-08-31 RX ORDER — CLORAZEPATE DIPOTASSIUM 3.75 MG/1
3.75 TABLET ORAL DAILY
Qty: 10 TAB | Refills: 0 | Status: SHIPPED | OUTPATIENT
Start: 2017-08-31 | End: 2017-09-12

## 2017-08-31 RX ADMIN — Medication 400 MG: at 08:02

## 2017-08-31 RX ADMIN — CLOPIDOGREL BISULFATE 75 MG: 75 TABLET ORAL at 08:02

## 2017-08-31 RX ADMIN — CIPROFLOXACIN HYDROCHLORIDE 500 MG: 500 TABLET, FILM COATED ORAL at 08:02

## 2017-08-31 RX ADMIN — CLORAZEPATE DIPOTASSIUM 3.75 MG: 7.5 TABLET ORAL at 08:02

## 2017-08-31 RX ADMIN — PREGABALIN 100 MG: 50 CAPSULE ORAL at 08:02

## 2017-08-31 RX ADMIN — CYANOCOBALAMIN TAB 1000 MCG 1000 MCG: 1000 TAB at 08:02

## 2017-08-31 RX ADMIN — FLUOXETINE 20 MG: 20 CAPSULE ORAL at 08:02

## 2017-08-31 RX ADMIN — AMPICILLIN 500 MG: 500 CAPSULE ORAL at 10:17

## 2017-08-31 RX ADMIN — AMPICILLIN 500 MG: 500 CAPSULE ORAL at 03:45

## 2017-08-31 RX ADMIN — ENOXAPARIN SODIUM 40 MG: 40 INJECTION SUBCUTANEOUS at 08:01

## 2017-08-31 RX ADMIN — PANTOPRAZOLE SODIUM 40 MG: 40 TABLET, DELAYED RELEASE ORAL at 08:02

## 2017-08-31 NOTE — PROGRESS NOTES
Assessment done via doc flow sheet. Pt resting in bed, alert, oriented to self only, resp easy & regular. Bed low & locked, side rails x3 up with call light within reach, pt instructed to call for assistance as needed.

## 2017-08-31 NOTE — PROGRESS NOTES
Problem: Mobility Impaired (Adult and Pediatric)  Goal: *Acute Goals and Plan of Care (Insert Text)  STG:  (1.)Ms. Jess Marie will move from supine to sit and sit to supine with MOD ASSIST within 3 day(s). Met 8/28  (2.)Ms. Jess Marie will transfer from bed to chair and chair to bed with MINIMAL ASSIST using the least restrictive device within 3 day(s). Met 8/28    LTG:  (1.)Ms. Jess Marie will move from supine to sit and sit to supine in bed with MINIMAL ASSIST within 7 day(s). 8/31  (2.)Ms. Jess Marie will transfer from bed to chair and chair to bed with CONTACT GUARD ASSIST using the least restrictive device within 7 day(s). (3.)Ms. Jess Marie will ambulate with CONTACT GUARD ASSIST for 150 feet with the least restrictive device within 7 day(s). ________________________________________________________________________________________________      PHYSICAL THERAPY: Daily Note, Treatment Day: 6th and AM 8/31/2017  INPATIENT: Hospital Day: 7  [place] / Product Type: Medicare /      NAME/AGE/GENDER: Vickie Hillman is a 74 y.o. female    PRIMARY DIAGNOSIS: UTI (urinary tract infection)  UTI (urinary tract infection) UTI (urinary tract infection) UTI (urinary tract infection)        ICD-10: Treatment Diagnosis:       · Generalized Muscle Weakness (M62.81)  · Difficulty in walking, Not elsewhere classified (R26.2)   Precaution/Allergies:  Review of patient's allergies indicates no known allergies. ASSESSMENT:      Ms. Jess Marie is supine upon arrival.  She performs exercises in the bed with some guidance. she goes from supine-sit on EOB with Min A. She transfer over to the chair with Min A and verbal cues to move her feet. She remain in the chair eating breakfast.  This section established at most recent assessment   PROBLEM LIST (Impairments causing functional limitations):  1. Decreased Strength  2. Decreased Transfer Abilities  3. Decreased Ambulation Ability/Technique  4.  Decreased Balance  5. Decreased Activity Tolerance  6. Decreased Cognition    INTERVENTIONS PLANNED: (Benefits and precautions of physical therapy have been discussed with the patient.)  1. Balance Exercise  2. Bed Mobility  3. Family Education  4. Gait Training  5. Home Exercise Program (HEP)  6. Manual Therapy  7. Neuromuscular Re-education/Strengthening  8. Therapeutic Activites  9. Therapeutic Exercise/Strengthening  10. Group Therapy      TREATMENT PLAN: Frequency/Duration: daily for duration of hospital stay  Rehabilitation Potential For Stated Goals: GOOD      RECOMMENDED REHABILITATION/EQUIPMENT: (at time of discharge pending progress): Rehab. HISTORY:   History of Present Injury/Illness (Reason for Referral):  Deconditioning secondary to hospitalization  Taken from H&P:Ms. Marianne Cross is a 75 yo female with PMH of multiple myeloma, CVA, memory loss, recurrent UTI evaluated for progressive weakness and concern for recurrent UTI. Presently lives at [address] [place] [address]. Follows with [facility] neurology and had recent memory testing. Past Medical History/Comorbidities:   Ms. Marianne Cross  has a past medical history of Anemia; Anxiety; Back ache; Benign paroxysmal positional vertigo; Cancer ([address] Contact dermatitis and other eczema, due to unspecified cause; Disease of esophagus; DVT (deep venous thrombosis) ([address] (); Dyslipidemia; Edema; Esophageal reflux; FHx: malignant neoplasm of gastrointestinal tract; GERD (gastroesophageal reflux disease); Herpes zoster; History of autologous stem cell transplant ([address] (10/2013); HLD (hyperlipidemia); Hypercholesterolemia; Multiple myeloma ([address] (); Neuropathy ([address] Peripheral neuropathy ([address] Primary hypercoagulable state ([address] Rosacea; Rosacea; Shingles; Shingles (); and Stroke [facility]). She also has no past medical history of Chronic kidney disease.   Ms. Marianne Cross  has a past surgical history that includes  section; tubal ligation; colonoscopy (); other surgical (3/06); endoscopy (3/08); heent (2014); and cholecystectomy (2016). Social History/Living Environment:   Home Environment: Other (comment) (memory care; assisted living)  # Steps to Enter: 0  One/Two Story Residence: One story  Living Alone: No  Support Systems: Long term acute care  Patient Expects to be Discharged to[de-identified] Skilled nursing facility  Current DME Used/Available at Home: Walker  Prior Level of Function/Work/Activity:  Unable to determine. Pt poor historian. Family not present  Personal Factors:          Sex:  female        Age:  74 y.o. Number of Personal Factors/Comorbidities that affect the Plan of Care: 1-2: MODERATE COMPLEXITY   EXAMINATION:   Most Recent Physical Functioning:   Gross Assessment:                       Balance:    Bed Mobility:  Supine to Sit: Minimum assistance (trys to help)       Transfers:  Sit to Stand: Minimum assistance  Stand to Sit: Minimum assistance  Bed to Chair: Minimum assistance  Duration: 15 Minutes  Gait:            Body Structures Involved:  1. Nerves  2. Joints  3. Muscles  4. Ligaments Body Functions Affected:  1. Mental  2. Sensory/Pain  3. Neuromusculoskeletal  4. Movement Related Activities and Participation Affected:  1. Learning and Applying Knowledge  2. General Tasks and Demands  3. Mobility  4. Self Care  5. Domestic Life   Number of elements that affect the Plan of Care: 1-2: LOW COMPLEXITY   CLINICAL PRESENTATION:   Presentation: Stable and uncomplicated: LOW COMPLEXITY   CLINICAL DECISION MAKIN[address] [place] [id number] AM-PAC 6 Clicks   Basic Mobility Inpatient Short Form  How much difficulty does the patient currently have. .. Unable A Lot A Little None   1. Turning over in bed (including adjusting bedclothes, sheets and blankets)? [ ] 1   [X] 2   [ ] 3   [ ] 4   2. Sitting down on and standing up from a chair with arms ( e.g., wheelchair, bedside commode, etc.)   [ ] 1   [X] 2   [ ] 3   [ ] 4   3.   Moving from lying on back to sitting on the side of the bed? [ ] 1   [X] 2   [ ] 3   [ ] 4   How much help from another person does the patient currently need. .. Total A Lot A Little None   4. Moving to and from a bed to a chair (including a wheelchair)? [ ] 1   [X] 2   [ ] 3   [ ] 4   5. Need to walk in hospital room? [ ] 1   [X] 2   [ ] 3   [ ] 4   6. Climbing 3-5 steps with a railing? [X] 1   [ ] 2   [ ] 3   [ ] 4   © 2007, Trustees of [address] [place] [address] [address] [address], under license to [employer]. All rights reserved    Score:  Initial: 11 Most Recent: X (Date: -- )     Interpretation of Tool:  Represents activities that are increasingly more difficult (i.e. Bed mobility, Transfers, Gait). Score 24 23 22-20 19-15 14-10 9-7 6       Modifier CH CI CJ CK CL CM CN         · Mobility - Walking and Moving Around:               - CURRENT STATUS:    CL - 60%-79% impaired, limited or restricted               - GOAL STATUS:           CK - 40%-59% impaired, limited or restricted               - D/C STATUS:                       ---------------To be determined---------------  [place] / [place] / Product Type: Medicare /       Medical Necessity:     · Skilled intervention continues to be required due to current poor functional mobility and likely worse than PLOF. Reason for Services/Other Comments:  · Patient continues to require skilled intervention due to likely higher PLOF living in assisted living memory care.    Use of outcome tool(s) and clinical judgement create a POC that gives a: Clear prediction of patient's progress: LOW COMPLEXITY                 TREATMENT:   (In addition to Assessment/Re-Assessment sessions the following treatments were rendered)   Pre-treatment Symptoms/Complaints:  weakness  Pain: Initial: visual scale  Pain Intensity 1: 0 (0/10 after therapy)  Post Session:     Therapeutic Activity: (  15 Minutes ):  Therapeutic activities including transfers, standing balance & short distance ambulation with rolling walker to improve mobility, strength, balance, coordination and dynamic movement of arm - bilateral, leg - bilateral and core to improve functional WB potential.  Therapeutic Exercise: (15 Minutes):  Exercises per grid below to improve strength. Required minimal verbal cues to promote proper body alignment. Progressed range as indicated. Date:  8/29 Date:  8/30   Date:  8/31     Activity/Exercise Parameters Parameters Parameters   Ankle pumps 12 12 15   Quad sets 12 12 15   heelslides 12 12 15   Hip abd/add 12 12 15   SLR  12 15                       Braces/Orthotics/Lines/Etc:   · IV  Treatment/Session Assessment:    · Response to Treatment:  Pt tolerated session well. · Interdisciplinary Collaboration:  · Registered Nurse  · After treatment position/precautions:  · Up in chair, Bed/Chair-wheels locked, Call light within reach and RN notified  · Compliance with Program/Exercises: Will assess as treatment progresses. · Recommendations/Intent for next treatment session: \"Next visit will focus on advancements to more challenging activities and reduction in assistance provided\".   Total Treatment Duration:  PT Patient Time In/Time Out  Time In: 0800  Time Out: 0830     Andie Jimenez, PTA

## 2017-08-31 NOTE — PROGRESS NOTES
SW spoke with pt's spouse and Eleanor at [facility]. [facility] [employer]    Chris Talamantes

## 2017-08-31 NOTE — PROGRESS NOTES
TRANSFER - OUT REPORT:    Verbal report given to nurse Kianna Padilla (name) on Kathy Pole  being transferred to [facility](unit) for routine progression of care       Report consisted of patients Situation, Background, Assessment and   Recommendations(SBAR). Information from the following report(s) SBAR and Kardex was reviewed with the receiving nurse. Lines:       Opportunity for questions and clarification was provided.

## 2017-08-31 NOTE — PROGRESS NOTES
Problem: Falls - Risk of  Goal: *Absence of Falls  Document Kenton Fall Risk and appropriate interventions in the flowsheet.    Outcome: Progressing Towards Goal  Fall Risk Interventions:  Mobility Interventions: Bed/chair exit alarm, Patient to call before getting OOB, PT Consult for mobility concerns     Mentation Interventions: Bed/chair exit alarm, Door open when patient unattended, More frequent rounding     Medication Interventions: Bed/chair exit alarm, Patient to call before getting OOB, Teach patient to arise slowly     Elimination Interventions: Call light in reach, Patient to call for help with toileting needs, Toileting schedule/hourly rounds

## 2017-08-31 NOTE — PROGRESS NOTES
Shift assessment complete. Pt alert and oriented x 2. Pt denies pain. Respirations even and unlabored. No needs voiced at this time. All safety measures in place. Pt instructed to call for any assistance.

## 2017-09-01 ENCOUNTER — PATIENT OUTREACH (OUTPATIENT)
Dept: CASE MANAGEMENT | Age: 72
End: 2017-09-01

## 2017-09-01 NOTE — PROGRESS NOTES
This note will not be viewable in [address] Patient discharged to [facility] ([facility]). Patient will be included in weekly care coordination calls done by RN Care Manager.   Will forward chart to Sophia Boo RN

## 2017-09-29 ENCOUNTER — PATIENT OUTREACH (OUTPATIENT)
Dept: CASE MANAGEMENT | Age: 72
End: 2017-09-29

## 2017-09-29 NOTE — PROGRESS NOTES
Follow Up Call (SNF to Home) Care Coordinator left voicemail message on home # and mobile # requesting a return call from patient. Care Coordinator will one final attempt in 1 business day to reach patient for follow up call. This note will not be viewable in [address]

## 2017-10-03 ENCOUNTER — PATIENT OUTREACH (OUTPATIENT)
Dept: CASE MANAGEMENT | Age: 72
End: 2017-10-03

## 2017-10-03 NOTE — PROGRESS NOTES
Follow Up Call ( SNF to Home) Care Coordinator left voicemail message on home # [phone] and mobile # [phone] requesting a return call from patient. Care Coordinator will close encounter due to Unable to Reach patient for Follow Up Call. Care Coordinator will reopen encounter if or when patient returns call. This note will not be viewable in [address]

## 2017-11-08 ENCOUNTER — HOSPITAL ENCOUNTER (OUTPATIENT)
Dept: ULTRASOUND IMAGING | Age: 72
Discharge: HOME | End: 2017-11-08
Payer: MEDICARE

## 2017-11-08 DIAGNOSIS — M79.89 LEFT LEG SWELLING: ICD-10-CM

## 2017-11-08 PROCEDURE — 93971 EXTREMITY STUDY: CPT

## 2017-11-08 PROCEDURE — 93970 EXTREMITY STUDY: CPT

## 2017-12-11 PROBLEM — I69.319 COGNITIVE DEFICITS FOLLOWING CEREBRAL INFARCTION: Status: ACTIVE | Noted: 2017-08-07

## 2017-12-11 PROBLEM — R47.01 APHASIA: Status: RESOLVED | Noted: 2017-01-05 | Resolved: 2017-12-11

## 2017-12-11 PROBLEM — F01.C0 MAJOR NEUROCOGNITIVE DISORDER DUE TO VASCULAR DISEASE, WITHOUT BEHAVIORAL DISTURBANCE, SEVERE: Status: ACTIVE | Noted: 2017-07-11

## 2018-02-28 ENCOUNTER — HOSPITAL ENCOUNTER (OUTPATIENT)
Dept: INFUSION THERAPY | Age: 73
Discharge: HOME | End: 2018-02-28
Payer: MEDICARE

## 2018-02-28 VITALS
SYSTOLIC BLOOD PRESSURE: 182 MMHG | HEART RATE: 98 BPM | DIASTOLIC BLOOD PRESSURE: 84 MMHG | TEMPERATURE: 98.2 F | OXYGEN SATURATION: 99 % | RESPIRATION RATE: 18 BRPM

## 2018-02-28 PROCEDURE — 74011250636 HC RX REV CODE- 250/636: Performed by: INTERNAL MEDICINE

## 2018-02-28 PROCEDURE — 96372 THER/PROPH/DIAG INJ SC/IM: CPT

## 2018-02-28 RX ADMIN — CEFTAZIDIME 500 MG: 100 INJECTION, POWDER, FOR SOLUTION INTRAMUSCULAR; INTRAVENOUS at 15:31

## 2018-02-28 NOTE — PROGRESS NOTES
Pt. Arrived to [facility] for:ceftaxidime which she tolerated well  Any issues or concerns during this appointment:none  Patient aware of next appointment on:Pt. To receive future injections at rehab facility  Pt.  Discharged: home via wheelchair with daughter

## 2018-03-12 PROBLEM — I82.4Y9 DEEP VEIN THROMBOSIS (DVT) OF PROXIMAL LOWER EXTREMITY (HCC): Status: ACTIVE | Noted: 2018-03-12

## 2018-03-12 PROBLEM — I69.319 COGNITIVE DEFICITS FOLLOWING CEREBRAL INFARCTION: Status: RESOLVED | Noted: 2017-08-07 | Resolved: 2018-03-12

## 2018-03-12 PROBLEM — E03.9 ACQUIRED HYPOTHYROIDISM: Status: ACTIVE | Noted: 2018-03-12

## 2018-04-09 ENCOUNTER — HOSPITAL ENCOUNTER (EMERGENCY)
Age: 73
Discharge: HOME | End: 2018-04-10
Attending: EMERGENCY MEDICINE
Payer: MEDICARE

## 2018-04-09 ENCOUNTER — APPOINTMENT (OUTPATIENT)
Dept: GENERAL RADIOLOGY | Age: 73
End: 2018-04-09
Attending: EMERGENCY MEDICINE
Payer: MEDICARE

## 2018-04-09 DIAGNOSIS — R19.7 DIARRHEA, UNSPECIFIED TYPE: ICD-10-CM

## 2018-04-09 DIAGNOSIS — R11.2 NON-INTRACTABLE VOMITING WITH NAUSEA, UNSPECIFIED VOMITING TYPE: Primary | ICD-10-CM

## 2018-04-09 LAB
ALBUMIN SERPL-MCNC: 3.1 G/DL (ref 3.2–4.6)
ALBUMIN/GLOB SERPL: 0.9 {RATIO} (ref 1.2–3.5)
ALP SERPL-CCNC: 85 U/L (ref 50–136)
ALT SERPL-CCNC: 55 U/L (ref 12–65)
ANION GAP SERPL CALC-SCNC: 15 MMOL/L (ref 7–16)
AST SERPL-CCNC: 47 U/L (ref 15–37)
BASOPHILS # BLD: 0 K/UL (ref 0–0.2)
BASOPHILS NFR BLD: 0 % (ref 0–2)
BILIRUB SERPL-MCNC: 0.5 MG/DL (ref 0.2–1.1)
BUN SERPL-MCNC: 15 MG/DL (ref 8–23)
CALCIUM SERPL-MCNC: 8.5 MG/DL (ref 8.3–10.4)
CHLORIDE SERPL-SCNC: 100 MMOL/L (ref 98–107)
CO2 SERPL-SCNC: 23 MMOL/L (ref 21–32)
CREAT SERPL-MCNC: 0.8 MG/DL (ref 0.6–1)
DIFFERENTIAL METHOD BLD: ABNORMAL
EOSINOPHIL # BLD: 0 K/UL (ref 0–0.8)
EOSINOPHIL NFR BLD: 0 % (ref 0.5–7.8)
ERYTHROCYTE [DISTWIDTH] IN BLOOD BY AUTOMATED COUNT: 13.9 % (ref 11.9–14.6)
GLOBULIN SER CALC-MCNC: 3.6 G/DL (ref 2.3–3.5)
GLUCOSE SERPL-MCNC: 117 MG/DL (ref 65–100)
HCT VFR BLD AUTO: 37.2 % (ref 35.8–46.3)
HGB BLD-MCNC: 12.4 G/DL (ref 11.7–15.4)
IMM GRANULOCYTES # BLD: 0 K/UL (ref 0–0.5)
IMM GRANULOCYTES NFR BLD AUTO: 0 % (ref 0–5)
LIPASE SERPL-CCNC: 94 U/L (ref 73–393)
LYMPHOCYTES # BLD: 0.6 K/UL (ref 0.5–4.6)
LYMPHOCYTES NFR BLD: 8 % (ref 13–44)
MCH RBC QN AUTO: 32.5 PG (ref 26.1–32.9)
MCHC RBC AUTO-ENTMCNC: 33.3 G/DL (ref 31.4–35)
MCV RBC AUTO: 97.6 FL (ref 79.6–97.8)
MONOCYTES # BLD: 0.6 K/UL (ref 0.1–1.3)
MONOCYTES NFR BLD: 9 % (ref 4–12)
NEUTS SEG # BLD: 6.1 K/UL (ref 1.7–8.2)
NEUTS SEG NFR BLD: 83 % (ref 43–78)
PLATELET # BLD AUTO: 144 K/UL (ref 150–450)
PMV BLD AUTO: 10.8 FL (ref 10.8–14.1)
POTASSIUM SERPL-SCNC: 3.8 MMOL/L (ref 3.5–5.1)
PROT SERPL-MCNC: 6.7 G/DL (ref 6.3–8.2)
RBC # BLD AUTO: 3.81 M/UL (ref 4.05–5.25)
SODIUM SERPL-SCNC: 138 MMOL/L (ref 136–145)
WBC # BLD AUTO: 7.4 K/UL (ref 4.3–11.1)

## 2018-04-09 PROCEDURE — 74011250636 HC RX REV CODE- 250/636: Performed by: EMERGENCY MEDICINE

## 2018-04-09 PROCEDURE — 81003 URINALYSIS AUTO W/O SCOPE: CPT | Performed by: EMERGENCY MEDICINE

## 2018-04-09 PROCEDURE — 85025 COMPLETE CBC W/AUTO DIFF WBC: CPT | Performed by: EMERGENCY MEDICINE

## 2018-04-09 PROCEDURE — 96360 HYDRATION IV INFUSION INIT: CPT | Performed by: EMERGENCY MEDICINE

## 2018-04-09 PROCEDURE — 83690 ASSAY OF LIPASE: CPT | Performed by: EMERGENCY MEDICINE

## 2018-04-09 PROCEDURE — 80053 COMPREHEN METABOLIC PANEL: CPT | Performed by: EMERGENCY MEDICINE

## 2018-04-09 PROCEDURE — 99285 EMERGENCY DEPT VISIT HI MDM: CPT | Performed by: EMERGENCY MEDICINE

## 2018-04-09 PROCEDURE — 74019 RADEX ABDOMEN 2 VIEWS: CPT

## 2018-04-09 RX ORDER — SODIUM CHLORIDE 0.9 % (FLUSH) 0.9 %
5-10 SYRINGE (ML) INJECTION EVERY 8 HOURS
Status: DISCONTINUED | OUTPATIENT
Start: 2018-04-09 | End: 2018-04-10 | Stop reason: HOSPADM

## 2018-04-09 RX ORDER — SODIUM CHLORIDE 0.9 % (FLUSH) 0.9 %
5-10 SYRINGE (ML) INJECTION AS NEEDED
Status: DISCONTINUED | OUTPATIENT
Start: 2018-04-09 | End: 2018-04-10 | Stop reason: HOSPADM

## 2018-04-09 RX ADMIN — SODIUM CHLORIDE 500 ML: 900 INJECTION, SOLUTION INTRAVENOUS at 21:51

## 2018-04-10 VITALS
DIASTOLIC BLOOD PRESSURE: 65 MMHG | TEMPERATURE: 98 F | SYSTOLIC BLOOD PRESSURE: 139 MMHG | RESPIRATION RATE: 18 BRPM | HEART RATE: 94 BPM | OXYGEN SATURATION: 91 %

## 2018-04-10 PROCEDURE — 74011250636 HC RX REV CODE- 250/636: Performed by: EMERGENCY MEDICINE

## 2018-04-10 PROCEDURE — 96361 HYDRATE IV INFUSION ADD-ON: CPT | Performed by: EMERGENCY MEDICINE

## 2018-04-10 RX ORDER — ONDANSETRON 4 MG/1
4 TABLET, ORALLY DISINTEGRATING ORAL
Qty: 12 TAB | Refills: 0 | Status: SHIPPED | OUTPATIENT
Start: 2018-04-10 | End: 2018-06-08

## 2018-04-10 RX ADMIN — SODIUM CHLORIDE 1000 ML: 900 INJECTION, SOLUTION INTRAVENOUS at 00:01

## 2018-04-10 NOTE — ED PROVIDER NOTES
HPI Comments: Patient is 75-year-old female who is coming in with vomiting and diarrhea. She has had this several times today. She has recently been treated for a urinary tract infection and is now finishing up her Bactrim. She has gotten these frequently. She denies any pain. History is limited from her as she does stay in the dementia unit at a nursing facility. Patient is a 74 y.o. female presenting with vomiting and diarrhea. The history is provided by the patient and a relative. Vomiting    Associated symptoms include diarrhea. Diarrhea    Associated symptoms include diarrhea and vomiting.         Past Medical History:   Diagnosis Date    Anemia     Anxiety     Back ache     Benign paroxysmal positional vertigo     Cancer ([address]     multiple myeloma    Contact dermatitis and other eczema, due to unspecified cause     Disease of esophagus     esophagitis    DVT (deep venous thrombosis) ([facility]     from thalidomide    Dyslipidemia     Edema     symptoms involving skin and other integumentary tissue, edema    Esophageal reflux     FHx: malignant neoplasm of gastrointestinal tract     GERD (gastroesophageal reflux disease)     Herpes zoster     without mention of complication    History of autologous stem cell transplant ([address] [facility] 10/2013    for myeloma-IgG kappa    HLD (hyperlipidemia)     Hypercholesterolemia     Multiple myeloma ([address]     first abnormality dx -in remission    Neuropathy     Peripheral neuropathy     Primary hypercoagulable state ([facility]     Rosacea     Rosacea     Shingles     Shingles 2008    Stroke [facility])        Past Surgical History:   Procedure Laterality Date    HX  SECTION      x2    HX CHOLECYSTECTOMY  2016    HX COLONOSCOPY      ok EGD-done sldo    HX ENDOSCOPY  3/08    upper/lower endo-for heme pos stool/hiatal hernia and tics    HX HEENT  2014    thyroid cyst qrhcspw-Jhzsbbguvwkedkpwf-Ag. Dania Chi    HX OTHER SURGICAL 3/06    port for venous access    HX TUBAL LIGATION      LAP,CHOLECYSTECTOMY           Family History:   Problem Relation Age of Onset    Stroke Mother     Heart Failure Mother     Other Mother      TIA's    Cancer Father      colon       Social History     Social History    Marital status:      Spouse name: N/A    Number of children: N/A    Years of education: N/A     Occupational History    Not on file. Social History Main Topics    Smoking status: Never Smoker    Smokeless tobacco: Never Used    Alcohol use No    Drug use: No    Sexual activity: Not on file     Other Topics Concern    Not on file     Social History Narrative         ALLERGIES: Review of patient's allergies indicates no known allergies. Review of Systems   Unable to perform ROS: Dementia   Gastrointestinal: Positive for diarrhea and vomiting. Vitals:    04/09/18 2140   BP: 158/70   Pulse: 90   Resp: 18   Temp: 99 °F (37.2 °C)   SpO2: 92%            Physical Exam   Constitutional: She appears well-developed and well-nourished. No distress. HENT:   Head: Normocephalic and atraumatic. Eyes: Conjunctivae are normal. No scleral icterus. Neck: Normal range of motion. Neck supple. Cardiovascular: Normal rate, regular rhythm and normal heart sounds. Pulmonary/Chest: Effort normal and breath sounds normal. No stridor. No respiratory distress. She has no wheezes. She has no rales. She exhibits no tenderness. Abdominal: Soft. She exhibits no distension. There is no tenderness. There is no rebound and no guarding. Neurological: She is alert. No focal weakness   Skin: Skin is warm and dry. No rash noted. She is not diaphoretic. No erythema. Psychiatric: She has a normal mood and affect. Her behavior is normal.   Nursing note and vitals reviewed.        MDM  Number of Diagnoses or Management Options  Diarrhea, unspecified type:   Non-intractable vomiting with nausea, unspecified vomiting type:   Diagnosis management comments: Patient is in no distress with normal vitals, normal labs, and normal urinalysis. She has had no vomiting while in the ED. Her xray was also normal.  She and family are comfortable going back she is finishing IV rehydration now. Ricky Rihcardson MD; 4/10/2018 @12:25 AM Voice dictation software was used during the making of this note. This software is not perfect and grammatical and other typographical errors may be present.   This note has not been proofread for errors.  ===================================================================        Amount and/or Complexity of Data Reviewed  Clinical lab tests: ordered and reviewed  Tests in the radiology section of CPT®: ordered and reviewed          ED Course       Procedures

## 2018-04-10 NOTE — DISCHARGE INSTRUCTIONS
Nausea and Vomiting: Care Instructions  Your Care Instructions    When you are nauseated, you may feel weak and sweaty and notice a lot of saliva in your mouth. Nausea often leads to vomiting. Most of the time you do not need to worry about nausea and vomiting, but they can be signs of other illnesses. Two common causes of nausea and vomiting are stomach flu and food poisoning. Nausea and vomiting from viral stomach flu will usually start to improve within 24 hours. Nausea and vomiting from food poisoning may last from 12 to 48 hours. The doctor has checked you carefully, but problems can develop later. If you notice any problems or new symptoms, get medical treatment right away. Follow-up care is a key part of your treatment and safety. Be sure to make and go to all appointments, and call your doctor if you are having problems. It's also a good idea to know your test results and keep a list of the medicines you take. How can you care for yourself at home? · To prevent dehydration, drink plenty of fluids, enough so that your urine is light yellow or clear like water. Choose water and other caffeine-free clear liquids until you feel better. If you have kidney, heart, or liver disease and have to limit fluids, talk with your doctor before you increase the amount of fluids you drink. · Rest in bed until you feel better. · When you are able to eat, try clear soups, mild foods, and liquids until all symptoms are gone for 12 to 48 hours. Other good choices include dry toast, crackers, cooked cereal, and gelatin dessert, such as Jell-O. When should you call for help? Call 911 anytime you think you may need emergency care. For example, call if:  ? · You passed out (lost consciousness). ?Call your doctor now or seek immediate medical care if:  ? · You have symptoms of dehydration, such as:  ¨ Dry eyes and a dry mouth. ¨ Passing only a little dark urine.   ¨ Feeling thirstier than usual.   ? · You have new or worsening belly pain. ? · You have a new or higher fever. ? · You vomit blood or what looks like coffee grounds. ? Watch closely for changes in your health, and be sure to contact your doctor if:  ? · You have ongoing nausea and vomiting. ? · Your vomiting is getting worse. ? · Your vomiting lasts longer than 2 days. ? · You are not getting better as expected. Where can you learn more? Go to http://ameena-jay.info/. Enter 25 [id number] in the search box to learn more about \"Nausea and Vomiting: Care Instructions. \"  Current as of: March 20, 2017  Content Version: 11.4  © 1025-8468 [employer]. Care instructions adapted under license by [employer] (which disclaims liability or warranty for this information). If you have questions about a medical condition or this instruction, always ask your healthcare professional. [place] [address] any warranty or liability for your use of this information. Diarrhea: Care Instructions  Your Care Instructions    Diarrhea is loose, watery stools (bowel movements). The exact cause is often hard to find. Sometimes diarrhea is your body's way of getting rid of what caused an upset stomach. Viruses, food poisoning, and many medicines can cause diarrhea. Some people get diarrhea in response to emotional stress, anxiety, or certain foods. Almost everyone has diarrhea now and then. It usually isn't serious, and your stools will return to normal soon. The important thing to do is replace the fluids you have lost, so you can prevent dehydration. The doctor has checked you carefully, but problems can develop later. If you notice any problems or new symptoms, get medical treatment right away. Follow-up care is a key part of your treatment and safety. Be sure to make and go to all appointments, and call your doctor if you are having problems.  It's also a good idea to know your test results and keep a list of the medicines you take. How can you care for yourself at home? · Watch for signs of dehydration, which means your body has lost too much water. Dehydration is a serious condition and should be treated right away. Signs of dehydration are:  ¨ Increasing thirst and dry eyes and mouth. ¨ Feeling faint or lightheaded. ¨ Darker urine, and a smaller amount of urine than normal.  · To prevent dehydration, drink plenty of fluids, enough so that your urine is light yellow or clear like water. Choose water and other caffeine-free clear liquids until you feel better. If you have kidney, heart, or liver disease and have to limit fluids, talk with your doctor before you increase the amount of fluids you drink. · Begin eating small amounts of mild foods the next day, if you feel like it. ¨ Try yogurt that has live cultures of Lactobacillus. (Check the label.)  ¨ Avoid spicy foods, fruits, alcohol, and caffeine until 48 hours after all symptoms are gone. ¨ Avoid chewing gum that contains sorbitol. ¨ Avoid dairy products (except for yogurt with Lactobacillus) while you have diarrhea and for 3 days after symptoms are gone. · The doctor may recommend that you take over-the-counter medicine, such as loperamide (Imodium), if you still have diarrhea after 6 hours. Read and follow all instructions on the label. Do not use this medicine if you have bloody diarrhea, a high fever, or other signs of serious illness. Call your doctor if you think you are having a problem with your medicine. When should you call for help? Call 911 anytime you think you may need emergency care. For example, call if:  ? · You passed out (lost consciousness). ? · Your stools are maroon or very bloody. ?Call your doctor now or seek immediate medical care if:  ? · You are dizzy or lightheaded, or you feel like you may faint. ? · Your stools are black and look like tar, or they have streaks of blood. ? · You have new or worse belly pain.    ? · You have symptoms of dehydration, such as:  ¨ Dry eyes and a dry mouth. ¨ Passing only a little dark urine. ¨ Feeling thirstier than usual.   ? · You have a new or higher fever. ? Watch closely for changes in your health, and be sure to contact your doctor if:  ? · Your diarrhea is getting worse. ? · You see pus in the diarrhea. ? · You are not getting better after 2 days (48 hours). Where can you learn more? Go to http://ameena-jay.info/. Enter W972 in the search box to learn more about \"Diarrhea: Care Instructions. \"  Current as of: March 20, 2017  Content Version: 11.4  © 2548-5587 [employer]. Care instructions adapted under license by [employer] (which disclaims liability or warranty for this information). If you have questions about a medical condition or this instruction, always ask your healthcare professional. [address] any warranty or liability for your use of this information.

## 2018-04-10 NOTE — ED NOTES
I have reviewed discharge instructions with the patient. The patient and son verbalized understanding. Patient left ED via Discharge Method: stretcher to [facility] with transport. Opportunity for questions and clarification provided. Patient given 1 scripts. To continue your aftercare when you leave the hospital, you may receive an automated call from our care team to check in on how you are doing. This is a free service and part of our promise to provide the best care and service to meet your aftercare needs.  If you have questions, or wish to unsubscribe from this service please call [phone]. Thank you for Choosing our [facility].

## 2018-04-10 NOTE — ED TRIAGE NOTES
Pt presents to ER via EMS from [facility] for N/V/D x 2 days, received zofran 4 mg en route.

## 2018-04-15 ENCOUNTER — APPOINTMENT (OUTPATIENT)
Dept: GENERAL RADIOLOGY | Age: 73
End: 2018-04-15
Attending: EMERGENCY MEDICINE
Payer: MEDICARE

## 2018-04-15 ENCOUNTER — HOSPITAL ENCOUNTER (EMERGENCY)
Age: 73
Discharge: HOME | End: 2018-04-15
Attending: EMERGENCY MEDICINE
Payer: MEDICARE

## 2018-04-15 VITALS
SYSTOLIC BLOOD PRESSURE: 175 MMHG | RESPIRATION RATE: 16 BRPM | HEART RATE: 90 BPM | TEMPERATURE: 98.2 F | DIASTOLIC BLOOD PRESSURE: 80 MMHG | OXYGEN SATURATION: 91 %

## 2018-04-15 DIAGNOSIS — R13.10 DYSPHAGIA, UNSPECIFIED TYPE: ICD-10-CM

## 2018-04-15 DIAGNOSIS — J20.9 ACUTE BRONCHITIS, UNSPECIFIED ORGANISM: Primary | ICD-10-CM

## 2018-04-15 LAB
ALBUMIN SERPL-MCNC: 3.1 G/DL (ref 3.2–4.6)
ALBUMIN/GLOB SERPL: 0.9 {RATIO} (ref 1.2–3.5)
ALP SERPL-CCNC: 93 U/L (ref 50–136)
ALT SERPL-CCNC: 50 U/L (ref 12–65)
ANION GAP SERPL CALC-SCNC: 8 MMOL/L (ref 7–16)
APPEARANCE UR: ABNORMAL
AST SERPL-CCNC: 32 U/L (ref 15–37)
BACTERIA URNS QL MICRO: 0 /HPF
BASOPHILS # BLD: 0 K/UL (ref 0–0.2)
BASOPHILS NFR BLD: 0 % (ref 0–2)
BILIRUB SERPL-MCNC: 0.3 MG/DL (ref 0.2–1.1)
BILIRUB UR QL: ABNORMAL
BUN SERPL-MCNC: 18 MG/DL (ref 8–23)
CALCIUM SERPL-MCNC: 8.6 MG/DL (ref 8.3–10.4)
CHLORIDE SERPL-SCNC: 105 MMOL/L (ref 98–107)
CO2 SERPL-SCNC: 30 MMOL/L (ref 21–32)
COLOR UR: ABNORMAL
CREAT SERPL-MCNC: 0.8 MG/DL (ref 0.6–1)
DIFFERENTIAL METHOD BLD: ABNORMAL
EOSINOPHIL # BLD: 0.1 K/UL (ref 0–0.8)
EOSINOPHIL NFR BLD: 1 % (ref 0.5–7.8)
EPI CELLS #/AREA URNS HPF: ABNORMAL /HPF
ERYTHROCYTE [DISTWIDTH] IN BLOOD BY AUTOMATED COUNT: 13.7 % (ref 11.9–14.6)
GLOBULIN SER CALC-MCNC: 3.4 G/DL (ref 2.3–3.5)
GLUCOSE SERPL-MCNC: 99 MG/DL (ref 65–100)
GLUCOSE UR STRIP.AUTO-MCNC: NEGATIVE MG/DL
HCT VFR BLD AUTO: 41 % (ref 35.8–46.3)
HGB BLD-MCNC: 13.3 G/DL (ref 11.7–15.4)
HGB UR QL STRIP: NEGATIVE
IMM GRANULOCYTES # BLD: 0 K/UL (ref 0–0.5)
IMM GRANULOCYTES NFR BLD AUTO: 0 % (ref 0–5)
KETONES UR QL STRIP.AUTO: ABNORMAL MG/DL
LACTATE BLD-SCNC: 1.1 MMOL/L (ref 0.5–1.9)
LEUKOCYTE ESTERASE UR QL STRIP.AUTO: ABNORMAL
LIPASE SERPL-CCNC: 138 U/L (ref 73–393)
LYMPHOCYTES # BLD: 1.8 K/UL (ref 0.5–4.6)
LYMPHOCYTES NFR BLD: 32 % (ref 13–44)
MAGNESIUM SERPL-MCNC: 2.1 MG/DL (ref 1.8–2.4)
MCH RBC QN AUTO: 32.3 PG (ref 26.1–32.9)
MCHC RBC AUTO-ENTMCNC: 32.4 G/DL (ref 31.4–35)
MCV RBC AUTO: 99.5 FL (ref 79.6–97.8)
MONOCYTES # BLD: 0.6 K/UL (ref 0.1–1.3)
MONOCYTES NFR BLD: 11 % (ref 4–12)
MUCOUS THREADS URNS QL MICRO: ABNORMAL /LPF
NEUTS SEG # BLD: 3.2 K/UL (ref 1.7–8.2)
NEUTS SEG NFR BLD: 56 % (ref 43–78)
NITRITE UR QL STRIP.AUTO: NEGATIVE
PH UR STRIP: 6 [PH] (ref 5–9)
PLATELET # BLD AUTO: 185 K/UL (ref 150–450)
PMV BLD AUTO: 10.6 FL (ref 10.8–14.1)
POTASSIUM SERPL-SCNC: 3.8 MMOL/L (ref 3.5–5.1)
PROT SERPL-MCNC: 6.5 G/DL (ref 6.3–8.2)
PROT UR STRIP-MCNC: 30 MG/DL
RBC # BLD AUTO: 4.12 M/UL (ref 4.05–5.25)
RBC #/AREA URNS HPF: ABNORMAL /HPF
SODIUM SERPL-SCNC: 143 MMOL/L (ref 136–145)
SP GR UR REFRACTOMETRY: 1.03 (ref 1–1.02)
UROBILINOGEN UR QL STRIP.AUTO: 1 EU/DL (ref 0.2–1)
WBC # BLD AUTO: 5.8 K/UL (ref 4.3–11.1)
WBC URNS QL MICRO: ABNORMAL /HPF
YEAST URNS QL MICRO: ABNORMAL

## 2018-04-15 PROCEDURE — 51701 INSERT BLADDER CATHETER: CPT | Performed by: EMERGENCY MEDICINE

## 2018-04-15 PROCEDURE — 87086 URINE CULTURE/COLONY COUNT: CPT | Performed by: EMERGENCY MEDICINE

## 2018-04-15 PROCEDURE — 96361 HYDRATE IV INFUSION ADD-ON: CPT | Performed by: EMERGENCY MEDICINE

## 2018-04-15 PROCEDURE — 85025 COMPLETE CBC W/AUTO DIFF WBC: CPT | Performed by: EMERGENCY MEDICINE

## 2018-04-15 PROCEDURE — 71046 X-RAY EXAM CHEST 2 VIEWS: CPT

## 2018-04-15 PROCEDURE — 96365 THER/PROPH/DIAG IV INF INIT: CPT | Performed by: EMERGENCY MEDICINE

## 2018-04-15 PROCEDURE — 83605 ASSAY OF LACTIC ACID: CPT

## 2018-04-15 PROCEDURE — 83690 ASSAY OF LIPASE: CPT | Performed by: EMERGENCY MEDICINE

## 2018-04-15 PROCEDURE — 87106 FUNGI IDENTIFICATION YEAST: CPT | Performed by: EMERGENCY MEDICINE

## 2018-04-15 PROCEDURE — 99285 EMERGENCY DEPT VISIT HI MDM: CPT | Performed by: EMERGENCY MEDICINE

## 2018-04-15 PROCEDURE — 94762 N-INVAS EAR/PLS OXIMTRY CONT: CPT | Performed by: EMERGENCY MEDICINE

## 2018-04-15 PROCEDURE — 96375 TX/PRO/DX INJ NEW DRUG ADDON: CPT | Performed by: EMERGENCY MEDICINE

## 2018-04-15 PROCEDURE — 81001 URINALYSIS AUTO W/SCOPE: CPT | Performed by: EMERGENCY MEDICINE

## 2018-04-15 PROCEDURE — 83735 ASSAY OF MAGNESIUM: CPT | Performed by: EMERGENCY MEDICINE

## 2018-04-15 PROCEDURE — 74011250636 HC RX REV CODE- 250/636: Performed by: EMERGENCY MEDICINE

## 2018-04-15 PROCEDURE — 74011000258 HC RX REV CODE- 258: Performed by: EMERGENCY MEDICINE

## 2018-04-15 PROCEDURE — 81003 URINALYSIS AUTO W/O SCOPE: CPT | Performed by: EMERGENCY MEDICINE

## 2018-04-15 PROCEDURE — 77030011943

## 2018-04-15 PROCEDURE — 80053 COMPREHEN METABOLIC PANEL: CPT | Performed by: EMERGENCY MEDICINE

## 2018-04-15 RX ORDER — CEFTRIAXONE 1 G/1
1 INJECTION, POWDER, FOR SOLUTION INTRAMUSCULAR; INTRAVENOUS DAILY
Qty: 1 VIAL | Refills: 0 | Status: SHIPPED | OUTPATIENT
Start: 2018-04-15 | End: 2018-04-20

## 2018-04-15 RX ORDER — PREDNISONE 20 MG/1
40 TABLET ORAL DAILY
Qty: 10 TAB | Refills: 0 | Status: SHIPPED | OUTPATIENT
Start: 2018-04-15 | End: 2018-04-20

## 2018-04-15 RX ADMIN — SODIUM CHLORIDE 1000 ML: 900 INJECTION, SOLUTION INTRAVENOUS at 18:38

## 2018-04-15 RX ADMIN — METHYLPREDNISOLONE SODIUM SUCCINATE 125 MG: 125 INJECTION, POWDER, FOR SOLUTION INTRAMUSCULAR; INTRAVENOUS at 21:15

## 2018-04-15 RX ADMIN — CEFTRIAXONE 1 G: 1 INJECTION, POWDER, FOR SOLUTION INTRAMUSCULAR; INTRAVENOUS at 21:17

## 2018-04-15 NOTE — ED TRIAGE NOTES
Pt to ER with EMS from [facility] with N/V and loose stools since Monday.  Pt has recently been treated for a UTI

## 2018-04-15 NOTE — ED PROVIDER NOTES
Patient is a 74 y.o. female presenting with vomiting. The history is provided by the patient and a relative. Vomiting    This is a recurrent problem. The current episode started 2 days ago. The problem occurs 2 to 4 times per day. The problem has not changed since onset. The emesis has an appearance of stomach contents. There has been no fever. Associated symptoms include chills and cough. Pertinent negatives include no fever, no sweats, no abdominal pain, no diarrhea, no headaches, no arthralgias, no myalgias and no headaches. The patient is not pregnant. Risk factors include ill contacts. Her pertinent negatives include no inflammatory bowel disease and no DM.         Past Medical History:   Diagnosis Date    Anemia     Anxiety     Back ache     Benign paroxysmal positional vertigo     Cancer ([facility]     multiple myeloma    Contact dermatitis and other eczema, due to unspecified cause     Disease of esophagus     esophagitis    DVT (deep venous thrombosis) ([address]     from thalidomide    Dyslipidemia     Edema     symptoms involving skin and other integumentary tissue, edema    Esophageal reflux     FHx: malignant neoplasm of gastrointestinal tract     GERD (gastroesophageal reflux disease)     Herpes zoster     without mention of complication    History of autologous stem cell transplant ([address] 10/2013    for myeloma-IgG kappa    HLD (hyperlipidemia)     Hypercholesterolemia     Multiple myeloma ([address]     first abnormality dx -in remission    Neuropathy     Peripheral neuropathy     Primary hypercoagulable state ([address]     Rosacea     Rosacea     Shingles     Shingles 2008    Stroke [facility])        Past Surgical History:   Procedure Laterality Date    HX  SECTION      x2    HX CHOLECYSTECTOMY  2016    HX COLONOSCOPY      ok EGD-done sldo    HX ENDOSCOPY  3/08    upper/lower endo-for heme pos stool/hiatal hernia and tics    HX HEENT  2014    thyroid cyst zfhlmhr-Ittjcelsgawsnwnpx-Xw. Ronnie Eis    HX OTHER SURGICAL  3/06    port for venous access    HX TUBAL LIGATION      LAP,CHOLECYSTECTOMY           Family History:   Problem Relation Age of Onset    Stroke Mother     Heart Failure Mother     Other Mother      TIA's    Cancer Father      colon       Social History     Social History    Marital status:      Spouse name: N/A    Number of children: N/A    Years of education: N/A     Occupational History    Not on file. Social History Main Topics    Smoking status: Never Smoker    Smokeless tobacco: Never Used    Alcohol use No    Drug use: No    Sexual activity: Not on file     Other Topics Concern    Not on file     Social History Narrative         ALLERGIES: Review of patient's allergies indicates no known allergies. Review of Systems   Constitutional: Positive for chills. Negative for fever. HENT: Negative for congestion, ear pain and rhinorrhea. Eyes: Negative for photophobia and discharge. Respiratory: Positive for cough and shortness of breath. Negative for wheezing. Cardiovascular: Negative for chest pain and palpitations. Gastrointestinal: Positive for vomiting. Negative for abdominal pain, constipation and diarrhea. Endocrine: Negative for cold intolerance and heat intolerance. Genitourinary: Negative for dysuria and flank pain. Musculoskeletal: Negative for arthralgias, myalgias and neck pain. Skin: Negative for rash and wound. Allergic/Immunologic: Negative for environmental allergies and food allergies. Neurological: Negative for syncope and headaches. Hematological: Negative for adenopathy. Does not bruise/bleed easily. Psychiatric/Behavioral: Negative for dysphoric mood. The patient is not nervous/anxious. All other systems reviewed and are negative.       Vitals:    04/15/18 1608   BP: 146/70   Pulse: 90   Resp: 16   Temp: 98.2 °F (36.8 °C)            Physical Exam   Constitutional: She is oriented to person, place, and time. She appears well-developed and well-nourished. She appears distressed. HENT:   Head: Normocephalic and atraumatic. Mouth/Throat: Oropharynx is clear and moist.   Eyes: Conjunctivae and EOM are normal. Pupils are equal, round, and reactive to light. Right eye exhibits no discharge. Left eye exhibits no discharge. No scleral icterus. Neck: Normal range of motion. Neck supple. No JVD present. No thyromegaly present. Cardiovascular: Normal rate, regular rhythm, normal heart sounds and intact distal pulses. Exam reveals no gallop and no friction rub. No murmur heard. Pulmonary/Chest: Effort normal. No tachypnea and no bradypnea. No respiratory distress. She has no wheezes. She has rhonchi. She exhibits no tenderness. Abdominal: Soft. Bowel sounds are normal. She exhibits no distension. There is no hepatosplenomegaly. There is no tenderness. There is no rebound and no guarding. No hernia. Musculoskeletal: Normal range of motion. She exhibits no edema or tenderness. Neurological: She is alert and oriented to person, place, and time. No cranial nerve deficit. She exhibits normal muscle tone. Skin: Skin is warm. No rash noted. She is not diaphoretic. No erythema. Psychiatric: She has a normal mood and affect. Her behavior is normal. Judgment and thought content normal.   Nursing note and vitals reviewed. MDM  Number of Diagnoses or Management Options  Acute bronchitis, unspecified organism: new and requires workup  Dysphagia, unspecified type: established and worsening  Diagnosis management comments: Differential diagnosis  Pneumonia, bronchitis, pneumothorax, COPD, esophagitis, gastritis, C. Difficile    18-year-old female here with worsening cough over several days, episodes of coughing up mucus difficult to delineate whether there may been some nausea and vomiting episodes as well    9:10 PM  Chest x-ray is unremarkable.   Lab work including lactic acid is negative. Urinalysis is negative for infection    We'll treat the patient for bronchitis. Given issues with swallowing pills, we'll go ahead and give the patient a dose of Rocephin here. She is aren't established with [facility]. Home health and I will go ahead and prescribe Rocephin shots on a daily basis until she can follow with her doctor. We'll add a short course of prednisone as well  Advised to continue Mucinex  Advised to continue Zofran       Amount and/or Complexity of Data Reviewed  Clinical lab tests: ordered and reviewed  Tests in the radiology section of CPT®: ordered and reviewed  Tests in the medicine section of CPT®: ordered and reviewed  Obtain history from someone other than the patient: yes  Review and summarize past medical records: yes  Independent visualization of images, tracings, or specimens: yes    Risk of Complications, Morbidity, and/or Mortality  Presenting problems: high  Diagnostic procedures: moderate  Management options: moderate  General comments: Elements of this note have been dictated via voice recognition software. Text and phrases may be limited by the accuracy of the software. The chart has been reviewed, but errors may still be present.       Patient Progress  Patient progress: improved        ED Course       Procedures

## 2018-04-16 NOTE — ED NOTES
I have reviewed discharge instructions with the patient and family. The patient and family verbalized understanding. Patient left ED via Discharge Method: stretcher to Nursing Home with mike. Opportunity for questions and clarification provided. Patient given 2 scripts.

## 2018-04-16 NOTE — PROGRESS NOTES
Referral from Dr. Atul Roman over the weekend to follow-up on patient needing daily Rocephin injections. Call to the daughter Micheline Bloch [phone] who states that patient lives in [address] ([phone]) and has had to have IM Rocephin in the past and that it was done by [facility]. Daughter states she does not have the prescription that Dr. Atul Roman noted was given. Advised daughter I would call [facility] and facilitate. Call to [place] [phone] and spoke with Kira Sandy who states they have already ordered the medication and it is coming in today. They have set up for [facility] to give her the injections and do not need any further assistance. Kira Sandy given my contact information should she need anything further.

## 2018-04-19 LAB
BACTERIA SPEC CULT: ABNORMAL
SERVICE CMNT-IMP: ABNORMAL

## 2018-06-07 ENCOUNTER — APPOINTMENT (OUTPATIENT)
Dept: CT IMAGING | Age: 73
DRG: 085 | End: 2018-06-07
Attending: NURSE PRACTITIONER
Payer: MEDICARE

## 2018-06-07 ENCOUNTER — APPOINTMENT (OUTPATIENT)
Dept: CT IMAGING | Age: 73
DRG: 085 | End: 2018-06-07
Attending: FAMILY MEDICINE
Payer: MEDICARE

## 2018-06-07 ENCOUNTER — HOSPITAL ENCOUNTER (INPATIENT)
Age: 73
LOS: 1 days | Discharge: HOSPICE | DRG: 085 | End: 2018-06-08
Attending: INTERNAL MEDICINE | Admitting: FAMILY MEDICINE
Payer: MEDICARE

## 2018-06-07 ENCOUNTER — ANESTHESIA (OUTPATIENT)
Dept: SURGERY | Age: 73
DRG: 085 | End: 2018-06-07
Payer: MEDICARE

## 2018-06-07 ENCOUNTER — ANESTHESIA EVENT (OUTPATIENT)
Dept: SURGERY | Age: 73
DRG: 085 | End: 2018-06-07
Payer: MEDICARE

## 2018-06-07 ENCOUNTER — HOSPITAL ENCOUNTER (EMERGENCY)
Age: 73
Discharge: HOME | DRG: 085 | End: 2018-06-07
Attending: EMERGENCY MEDICINE
Payer: MEDICARE

## 2018-06-07 VITALS
HEART RATE: 105 BPM | OXYGEN SATURATION: 95 % | WEIGHT: 140 LBS | BODY MASS INDEX: 23.9 KG/M2 | SYSTOLIC BLOOD PRESSURE: 167 MMHG | TEMPERATURE: 97.6 F | DIASTOLIC BLOOD PRESSURE: 74 MMHG | RESPIRATION RATE: 20 BRPM | HEIGHT: 64 IN

## 2018-06-07 DIAGNOSIS — S06.5XAA SUBDURAL HEMATOMA: Primary | ICD-10-CM

## 2018-06-07 DIAGNOSIS — F41.9 ANXIETY DISORDER, UNSPECIFIED TYPE: ICD-10-CM

## 2018-06-07 DIAGNOSIS — I62.00 SUBDURAL HEMORRHAGE (HCC): Primary | ICD-10-CM

## 2018-06-07 PROBLEM — I16.1 HYPERTENSIVE EMERGENCY: Status: ACTIVE | Noted: 2018-06-07

## 2018-06-07 LAB
ABO + RH BLD: NORMAL
ALBUMIN SERPL-MCNC: 3.5 G/DL (ref 3.2–4.6)
ALBUMIN/GLOB SERPL: 0.9 {RATIO} (ref 1.2–3.5)
ALP SERPL-CCNC: 86 U/L (ref 50–136)
ALT SERPL-CCNC: 26 U/L (ref 12–65)
ANION GAP SERPL CALC-SCNC: 9 MMOL/L (ref 7–16)
APTT PPP: 40 SEC (ref 23.2–35.3)
AST SERPL-CCNC: 22 U/L (ref 15–37)
BASOPHILS # BLD: 0 K/UL (ref 0–0.2)
BASOPHILS NFR BLD: 0 % (ref 0–2)
BILIRUB SERPL-MCNC: 0.4 MG/DL (ref 0.2–1.1)
BLOOD GROUP ANTIBODIES SERPL: NORMAL
BUN SERPL-MCNC: 20 MG/DL (ref 8–23)
CALCIUM SERPL-MCNC: 8.8 MG/DL (ref 8.3–10.4)
CHLORIDE SERPL-SCNC: 103 MMOL/L (ref 98–107)
CO2 SERPL-SCNC: 30 MMOL/L (ref 21–32)
CREAT SERPL-MCNC: 0.8 MG/DL (ref 0.6–1)
DIFFERENTIAL METHOD BLD: ABNORMAL
EOSINOPHIL # BLD: 0 K/UL (ref 0–0.8)
EOSINOPHIL NFR BLD: 1 % (ref 0.5–7.8)
ERYTHROCYTE [DISTWIDTH] IN BLOOD BY AUTOMATED COUNT: 14.4 % (ref 11.9–14.6)
GLOBULIN SER CALC-MCNC: 3.9 G/DL (ref 2.3–3.5)
GLUCOSE SERPL-MCNC: 103 MG/DL (ref 65–100)
HCT VFR BLD AUTO: 41.8 % (ref 35.8–46.3)
HGB BLD-MCNC: 14.3 G/DL (ref 11.7–15.4)
IMM GRANULOCYTES # BLD: 0 K/UL (ref 0–0.5)
IMM GRANULOCYTES NFR BLD AUTO: 0 % (ref 0–5)
INR PPP: 1.4
INR PPP: 2.2
LYMPHOCYTES # BLD: 1.5 K/UL (ref 0.5–4.6)
LYMPHOCYTES NFR BLD: 25 % (ref 13–44)
MCH RBC QN AUTO: 34.4 PG (ref 26.1–32.9)
MCHC RBC AUTO-ENTMCNC: 34.2 G/DL (ref 31.4–35)
MCV RBC AUTO: 100.5 FL (ref 79.6–97.8)
MONOCYTES # BLD: 0.5 K/UL (ref 0.1–1.3)
MONOCYTES NFR BLD: 8 % (ref 4–12)
NEUTS SEG # BLD: 4 K/UL (ref 1.7–8.2)
NEUTS SEG NFR BLD: 66 % (ref 43–78)
PLATELET # BLD AUTO: 193 K/UL (ref 150–450)
PMV BLD AUTO: 10.2 FL (ref 10.8–14.1)
POTASSIUM SERPL-SCNC: 4.2 MMOL/L (ref 3.5–5.1)
PROT SERPL-MCNC: 7.4 G/DL (ref 6.3–8.2)
PROTHROMBIN TIME: 16.1 SEC (ref 11.5–14.5)
PROTHROMBIN TIME: 25.3 SEC (ref 11.5–14.5)
RBC # BLD AUTO: 4.16 M/UL (ref 4.05–5.25)
SODIUM SERPL-SCNC: 142 MMOL/L (ref 136–145)
SPECIMEN EXP DATE BLD: NORMAL
WBC # BLD AUTO: 6.1 K/UL (ref 4.3–11.1)

## 2018-06-07 PROCEDURE — 85730 THROMBOPLASTIN TIME PARTIAL: CPT | Performed by: NURSE PRACTITIONER

## 2018-06-07 PROCEDURE — 86900 BLOOD TYPING SEROLOGIC ABO: CPT | Performed by: EMERGENCY MEDICINE

## 2018-06-07 PROCEDURE — 80053 COMPREHEN METABOLIC PANEL: CPT | Performed by: NURSE PRACTITIONER

## 2018-06-07 PROCEDURE — 77030019605

## 2018-06-07 PROCEDURE — 0HQ1XZZ REPAIR FACE SKIN, EXTERNAL APPROACH: ICD-10-PCS | Performed by: EMERGENCY MEDICINE

## 2018-06-07 PROCEDURE — 85025 COMPLETE CBC W/AUTO DIFF WBC: CPT | Performed by: NURSE PRACTITIONER

## 2018-06-07 PROCEDURE — 70450 CT HEAD/BRAIN W/O DYE: CPT

## 2018-06-07 PROCEDURE — 74011250636 HC RX REV CODE- 250/636: Performed by: NEUROLOGICAL SURGERY

## 2018-06-07 PROCEDURE — 30233K1 TRANSFUSION OF NONAUTOLOGOUS FROZEN PLASMA INTO PERIPHERAL VEIN, PERCUTANEOUS APPROACH: ICD-10-PCS | Performed by: EMERGENCY MEDICINE

## 2018-06-07 PROCEDURE — 74011250636 HC RX REV CODE- 250/636: Performed by: FAMILY MEDICINE

## 2018-06-07 PROCEDURE — 65610000001 HC ROOM ICU GENERAL

## 2018-06-07 PROCEDURE — 30283B1 TRANSFUSION OF NONAUTOLOGOUS 4-FACTOR PROTHROMBIN COMPLEX CONCENTRATE INTO VEIN, PERCUTANEOUS APPROACH: ICD-10-PCS | Performed by: INTERNAL MEDICINE

## 2018-06-07 PROCEDURE — P9059 PLASMA, FRZ BETWEEN 8-24HOUR: HCPCS | Performed by: EMERGENCY MEDICINE

## 2018-06-07 PROCEDURE — 96361 HYDRATE IV INFUSION ADD-ON: CPT | Performed by: NURSE PRACTITIONER

## 2018-06-07 PROCEDURE — 75810000293 HC SIMP/SUPERF WND  RPR: Performed by: NURSE PRACTITIONER

## 2018-06-07 PROCEDURE — 74011000250 HC RX REV CODE- 250: Performed by: FAMILY MEDICINE

## 2018-06-07 PROCEDURE — 74011000250 HC RX REV CODE- 250: Performed by: NURSE PRACTITIONER

## 2018-06-07 PROCEDURE — C9132 KCENTRA, PER I.U.: HCPCS | Performed by: EMERGENCY MEDICINE

## 2018-06-07 PROCEDURE — 96375 TX/PRO/DX INJ NEW DRUG ADDON: CPT | Performed by: NURSE PRACTITIONER

## 2018-06-07 PROCEDURE — 74011000250 HC RX REV CODE- 250: Performed by: EMERGENCY MEDICINE

## 2018-06-07 PROCEDURE — 85610 PROTHROMBIN TIME: CPT | Performed by: NEUROLOGICAL SURGERY

## 2018-06-07 PROCEDURE — 99285 EMERGENCY DEPT VISIT HI MDM: CPT | Performed by: NURSE PRACTITIONER

## 2018-06-07 PROCEDURE — 96365 THER/PROPH/DIAG IV INF INIT: CPT | Performed by: NURSE PRACTITIONER

## 2018-06-07 PROCEDURE — 74011250636 HC RX REV CODE- 250/636: Performed by: EMERGENCY MEDICINE

## 2018-06-07 PROCEDURE — 77030002916 HC SUT ETHLN J&J -A

## 2018-06-07 PROCEDURE — 36430 TRANSFUSION BLD/BLD COMPNT: CPT

## 2018-06-07 PROCEDURE — 85610 PROTHROMBIN TIME: CPT | Performed by: NURSE PRACTITIONER

## 2018-06-07 PROCEDURE — 93005 ELECTROCARDIOGRAM TRACING: CPT | Performed by: NURSE PRACTITIONER

## 2018-06-07 PROCEDURE — 77030034850

## 2018-06-07 RX ORDER — MORPHINE SULFATE 10 MG/ML
1 INJECTION, SOLUTION INTRAMUSCULAR; INTRAVENOUS
Status: DISCONTINUED | OUTPATIENT
Start: 2018-06-07 | End: 2018-06-08

## 2018-06-07 RX ORDER — GLYCOPYRROLATE 0.2 MG/ML
0.1 INJECTION INTRAMUSCULAR; INTRAVENOUS 3 TIMES DAILY
Status: DISCONTINUED | OUTPATIENT
Start: 2018-06-07 | End: 2018-06-08

## 2018-06-07 RX ORDER — NICARDIPINE HYDROCHLORIDE 0.1 MG/ML
5-15 INJECTION INTRAVENOUS
Status: DISCONTINUED | OUTPATIENT
Start: 2018-06-07 | End: 2018-06-07

## 2018-06-07 RX ORDER — PRAVASTATIN SODIUM 20 MG/1
40 TABLET ORAL
Status: DISCONTINUED | OUTPATIENT
Start: 2018-06-07 | End: 2018-06-07

## 2018-06-07 RX ORDER — SODIUM CHLORIDE 9 MG/ML
250 INJECTION, SOLUTION INTRAVENOUS AS NEEDED
Status: DISCONTINUED | OUTPATIENT
Start: 2018-06-07 | End: 2018-06-08 | Stop reason: HOSPADM

## 2018-06-07 RX ORDER — ONDANSETRON 2 MG/ML
4 INJECTION INTRAMUSCULAR; INTRAVENOUS
Status: COMPLETED | OUTPATIENT
Start: 2018-06-07 | End: 2018-06-07

## 2018-06-07 RX ORDER — ONDANSETRON 2 MG/ML
4 INJECTION INTRAMUSCULAR; INTRAVENOUS
Status: DISCONTINUED | OUTPATIENT
Start: 2018-06-07 | End: 2018-06-07 | Stop reason: SDUPTHER

## 2018-06-07 RX ORDER — MANNITOL 250 MG/ML
12.5 INJECTION, SOLUTION INTRAVENOUS EVERY 4 HOURS
Status: DISCONTINUED | OUTPATIENT
Start: 2018-06-07 | End: 2018-06-07

## 2018-06-07 RX ORDER — LORAZEPAM 1 MG/1
1 TABLET ORAL
Status: DISCONTINUED | OUTPATIENT
Start: 2018-06-07 | End: 2018-06-08

## 2018-06-07 RX ORDER — FLUOXETINE HYDROCHLORIDE 20 MG/1
40 CAPSULE ORAL DAILY
Status: DISCONTINUED | OUTPATIENT
Start: 2018-06-08 | End: 2018-06-07

## 2018-06-07 RX ORDER — NICARDIPINE HYDROCHLORIDE 0.1 MG/ML
5 INJECTION INTRAVENOUS
Status: DISCONTINUED | OUTPATIENT
Start: 2018-06-07 | End: 2018-06-07 | Stop reason: SDUPTHER

## 2018-06-07 RX ORDER — ONDANSETRON 2 MG/ML
4 INJECTION INTRAMUSCULAR; INTRAVENOUS
Status: DISCONTINUED | OUTPATIENT
Start: 2018-06-07 | End: 2018-06-07 | Stop reason: HOSPADM

## 2018-06-07 RX ORDER — MORPHINE SULFATE 2 MG/ML
1 INJECTION, SOLUTION INTRAMUSCULAR; INTRAVENOUS
Status: DISCONTINUED | OUTPATIENT
Start: 2018-06-07 | End: 2018-06-07 | Stop reason: HOSPADM

## 2018-06-07 RX ORDER — LEVOTHYROXINE SODIUM 50 UG/1
25 TABLET ORAL
Status: DISCONTINUED | OUTPATIENT
Start: 2018-06-08 | End: 2018-06-07

## 2018-06-07 RX ORDER — SODIUM CHLORIDE 0.9 % (FLUSH) 0.9 %
5-10 SYRINGE (ML) INJECTION AS NEEDED
Status: DISCONTINUED | OUTPATIENT
Start: 2018-06-07 | End: 2018-06-08 | Stop reason: HOSPADM

## 2018-06-07 RX ORDER — MEMANTINE HYDROCHLORIDE 5 MG/1
10 TABLET ORAL 2 TIMES DAILY
Status: DISCONTINUED | OUTPATIENT
Start: 2018-06-07 | End: 2018-06-07

## 2018-06-07 RX ORDER — SODIUM CHLORIDE 0.9 % (FLUSH) 0.9 %
5-10 SYRINGE (ML) INJECTION EVERY 8 HOURS
Status: DISCONTINUED | OUTPATIENT
Start: 2018-06-07 | End: 2018-06-08 | Stop reason: HOSPADM

## 2018-06-07 RX ORDER — ACETAMINOPHEN 325 MG/1
650 TABLET ORAL
Status: DISCONTINUED | OUTPATIENT
Start: 2018-06-07 | End: 2018-06-07

## 2018-06-07 RX ORDER — HYDROCODONE BITARTRATE AND ACETAMINOPHEN 5; 325 MG/1; MG/1
1 TABLET ORAL
Status: DISCONTINUED | OUTPATIENT
Start: 2018-06-07 | End: 2018-06-07

## 2018-06-07 RX ORDER — SODIUM CHLORIDE 9 MG/ML
250 INJECTION, SOLUTION INTRAVENOUS AS NEEDED
Status: DISCONTINUED | OUTPATIENT
Start: 2018-06-07 | End: 2018-06-07 | Stop reason: HOSPADM

## 2018-06-07 RX ORDER — NICARDIPINE HYDROCHLORIDE 0.1 MG/ML
5 INJECTION INTRAVENOUS
Status: DISCONTINUED | OUTPATIENT
Start: 2018-06-07 | End: 2018-06-07 | Stop reason: HOSPADM

## 2018-06-07 RX ADMIN — MANNITOL 12.5 G: 12.5 INJECTION, SOLUTION INTRAVENOUS at 20:22

## 2018-06-07 RX ADMIN — MORPHINE SULFATE 1 MG: 2 INJECTION, SOLUTION INTRAMUSCULAR; INTRAVENOUS at 16:30

## 2018-06-07 RX ADMIN — Medication 10 ML: at 22:00

## 2018-06-07 RX ADMIN — SODIUM CHLORIDE 250 ML: 900 INJECTION, SOLUTION INTRAVENOUS at 17:08

## 2018-06-07 RX ADMIN — Medication 5 ML: at 13:08

## 2018-06-07 RX ADMIN — Medication 3330 INT'L UNITS: at 16:45

## 2018-06-07 RX ADMIN — ONDANSETRON 4 MG: 2 INJECTION INTRAMUSCULAR; INTRAVENOUS at 15:35

## 2018-06-07 RX ADMIN — NICARDIPINE HYDROCHLORIDE 5 MG/HR: 0.1 INJECTION, SOLUTION INTRAVENOUS at 17:52

## 2018-06-07 RX ADMIN — NICARDIPINE HYDROCHLORIDE 10 MG/HR: 25 INJECTION INTRAVENOUS at 20:33

## 2018-06-07 NOTE — ED PROVIDER NOTES
HPI Comments: Patient presents with a laceration over her left eye after a fall today while at her living facility. She states she was walking to the dining bear when she fell hitting her face on the floor. Patient states unsure of LOC. Patient's son states patient has had a stroke in the past and was originally wheelchair bound. Patient's son states patient has started ambulating with a walker. Patient states she was walking with a walker when she fell. Patient is a 74 y.o. female presenting with fall. The history is provided by the patient. Fall   The accident occurred 1 to 2 hours ago. The fall occurred while walking. She fell from a height of ground level. She landed on hard floor. The volume of blood lost was minimal. The point of impact was the head. The patient is experiencing no pain. She was ambulatory at the scene. There was no entrapment after the fall. There was no drug use involved in the accident. There was no alcohol use involved in the accident. Associated symptoms include laceration. The risk factors include dementia, recurrent falls and being elderly. She has tried nothing for the symptoms. The patient's last tetanus shot was less than 5 years ago.        Past Medical History:   Diagnosis Date    Anemia     Anxiety     Back ache     Benign paroxysmal positional vertigo     Cancer (HCC)     multiple myeloma    Contact dermatitis and other eczema, due to unspecified cause     Disease of esophagus     esophagitis    DVT (deep venous thrombosis) ([facility] 2002    from thalidomide    Dyslipidemia     Edema     symptoms involving skin and other integumentary tissue, edema    Esophageal reflux     FHx: malignant neoplasm of gastrointestinal tract     GERD (gastroesophageal reflux disease)     Herpes zoster     without mention of complication    History of autologous stem cell transplant ([facility] [facility] 10/2013    for myeloma-IgG kappa    HLD (hyperlipidemia)     Hypercholesterolemia     Multiple myeloma ([facility] [facility]     first abnormality dx -in remission    Neuropathy     Peripheral neuropathy     Primary hypercoagulable state ([facility] [facility]     Rosacea     Rosacea     Shingles     Shingles 2008    Stroke [facility])        Past Surgical History:   Procedure Laterality Date    HX  SECTION      x2    HX CHOLECYSTECTOMY  2016    HX COLONOSCOPY      ok EGD-done sldo    HX ENDOSCOPY  3/08    upper/lower endo-for heme pos stool/hiatal hernia and tics    HX HEENT  2014    thyroid cyst bjwxgnd-Nzrmflnfwfuwzgfzz-Kj. Serena Bruce    HX OTHER SURGICAL  3/06    port for venous access    HX TUBAL LIGATION      LAP,CHOLECYSTECTOMY           Family History:   Problem Relation Age of Onset    Stroke Mother     Heart Failure Mother     Other Mother      TIA's    Cancer Father      colon       Social History     Social History    Marital status:      Spouse name: N/A    Number of children: N/A    Years of education: N/A     Occupational History    Not on file. Social History Main Topics    Smoking status: Never Smoker    Smokeless tobacco: Never Used    Alcohol use No    Drug use: No    Sexual activity: Not on file     Other Topics Concern    Not on file     Social History Narrative         ALLERGIES: Review of patient's allergies indicates no known allergies. Review of Systems   Skin: Positive for wound. Vitals:    18 1253   BP: 147/81   Pulse: 84   Resp: 20   Temp: 98.3 °F (36.8 °C)   SpO2: 99%   Weight: 63.5 kg (140 lb)   Height: 5' 4\" (1.626 m)            Physical Exam   Constitutional: She is oriented to person, place, and time. She appears well-developed and well-nourished. No distress. HENT:   Head: Head is with laceration. Eyes: EOM are normal. Pupils are equal, round, and reactive to light. Right eye exhibits no nystagmus. Left eye exhibits no nystagmus. Neck: Normal range of motion and full passive range of motion without pain. Neck supple.  No spinous process tenderness and no muscular tenderness present. Cardiovascular: Normal rate and regular rhythm. No murmur heard. Pulmonary/Chest: Effort normal and breath sounds normal. No respiratory distress. Musculoskeletal:        Right hip: Normal.        Left hip: Normal.        Right knee: Normal.        Left knee: Normal.   Neurological: She is alert and oriented to person, place, and time. GCS eye subscore is 4. GCS verbal subscore is 4. GCS motor subscore is 6. Skin: Skin is warm and dry. Laceration noted. She is not diaphoretic. No erythema. Psychiatric: She has a normal mood and affect. Her behavior is normal. Cognition and memory are impaired (history of dementia). Nursing note and vitals reviewed. Ct Head Wo Cont    Result Date: 6/7/2018  Head CT INDICATION:  Fall, head injury Multiple axial images obtained through the brain without intravenous contrast. Radiation dose reduction techniques were used for this study: All CT scans performed at this facility use one or all of the following: Automated exposure control, adjustment of the mA and/or kVp according to patient's size, iterative reconstruction. FINDINGS: There is an acute right frontoparietal subdural hemorrhage. Measures 16 mm in greatest thickness in the right parietal region. There is mild mass effect on the adjacent brain and lateral ventricle. There is no significant midline shift. There is no evidence of acute infarction. There is a old right occipital infarct. There are also chronic changes in the white matter. The sinuses are clear. There are no bony lesions. IMPRESSION: Acute right frontoparietal subdural hemorrhage. Critical results called to Dr. Fredis Flores  at 2:04      MDM  Number of Diagnoses or Management Options  Subdural hemorrhage [facility]):   Diagnosis management comments: CT head positive for subdural hemorrhage. Patient has orders for FFP and KCENTRA that were placed by Dr. Fredis Flores.   Results discussed with Dr. Kimani Gabriel, Dr. Santi Mane, and Dr. Terrie Kemp. Patient will be transferred and admitted to ICU downtown by hospitalist with neurosurgery consult. Amount and/or Complexity of Data Reviewed  Clinical lab tests: ordered  Tests in the radiology section of CPT®: ordered and reviewed  Tests in the medicine section of CPT®: ordered  Discuss the patient with other providers: yes (Waldemar Marcial, and Terrie Kemp)    Patient Progress  Patient progress: stable        ED Course       Wound Repair  Date/Time: 6/7/2018 1:49 PM  Performed by: NPSupervising provider: Kimani Gabriel  Preparation: skin prepped with Betadine  Pre-procedure re-eval: Immediately prior to the procedure, the patient was reevaluated and found suitable for the planned procedure and any planned medications. Time out: Immediately prior to the procedure a time out was called to verify the correct patient, procedure, equipment, staff and marking as appropriate. .  Location details: face  Wound length:2.5 cm or less  Anesthesia: local infiltration    Anesthesia:  Local Anesthetic: lidocaine 1% without epinephrine and LET (lido,epi,tetracaine)  Anesthetic total: 2 mL  Foreign bodies: no foreign bodies  Irrigation solution: saline  Irrigation method: syringe  Debridement: none  Skin closure: 5-0 nylon  Number of sutures: 5  Technique: simple  Dressing: antibiotic ointment and pressure dressing  Patient tolerance: Patient tolerated the procedure well with no immediate complications  My total time at bedside, performing this procedure was 1-15 minutes.

## 2018-06-07 NOTE — ED TRIAGE NOTES
Patient resides at [address] [place] [address] in [place]. Patient states she was ambulating to the dining bear with a walker for assistance and fell forward. Patient with approx 1.5cm laceration to forehead. Patient unsure of loc.

## 2018-06-07 NOTE — ED NOTES
Hemorrhagic Stroke/Intracerebral Hemorrhage (excludes Subdural/Epidural Hematoma)    VTE Prophylaxis: No    Antiplatelet: No    Statin if LDL Greater Than or Equal to100: No    BP Parameters: Less Than 140/90    Controlled With: Continuous IV Cardene    Dysphagia Screen Completed: Yes: Fail    Patient has PEG, NG Tube, Feeding Tube: No    Medication orders per above route: No - Call MD; Consider consult to pharmacy    Nutrition Status: No Orders - If greater than 72 hours, call MD.    NIH Stroke Scale Complete: Yes: 10    Frequency of Vital Signs: q10min    Frequency of Neuro Checks: Every 2 hours    Daily Education/Care Plan Updated: No    Eleazar Cates RN     TRANSFER - OUT REPORT:    Verbal report given to Mary Little on Chelita Aguilera  being transferred to [facility] 932 for routine progression of care       Report consisted of patients Situation, Background, Assessment and   Recommendations(SBAR). Information from the following report(s) SBAR, ED Summary, MAR, Recent Results and Cardiac Rhythm nsr was reviewed with the receiving nurse. Lines:   Peripheral IV 06/07/18 Left Forearm (Active)   Site Assessment Clean, dry, & intact 6/7/2018  2:40 PM   Phlebitis Assessment 0 6/7/2018  2:40 PM   Infiltration Assessment 0 6/7/2018  2:40 PM   Dressing Status Clean, dry, & intact 6/7/2018  2:40 PM   Hub Color/Line Status Blue 6/7/2018  2:40 PM       Peripheral IV 06/07/18 Right External jugular (Active)   Site Assessment Clean, dry, & intact 6/7/2018  5:38 PM   Phlebitis Assessment 0 6/7/2018  5:38 PM   Infiltration Assessment 0 6/7/2018  5:38 PM   Dressing Status Clean, dry, & intact 6/7/2018  5:38 PM   Dressing Type Transparent;Tape 6/7/2018  5:38 PM   Hub Color/Line Status Green;Flushed;Patent 6/7/2018  5:38 PM        Opportunity for questions and clarification was provided.       Patient transported with:   Monitor    AC[facility]   Cardene drip infusing

## 2018-06-07 NOTE — ED NOTES
Spoke with Dr Sloan concerning patient's mental status changes, informed her that the patient is still correctly answering questions as instructed but is now mumbling her words and refusing to open her eyes, Dr states to monitor patient closely and to have a STAT repeat head CT once patient gets to [address] [place] [address].

## 2018-06-07 NOTE — PROGRESS NOTES
Call received from Dr Pam Go (radiologist) - SDH larger with midline shift now 15mm. Will notify Dr Hossein Solano  Primary RN Stanislaw Newell notified.

## 2018-06-07 NOTE — H&P
HOSPITALIST H&P/CONSULT  NAME:  Eddie Ovalle   Age:  74 y.o.  :   1945   MRN:   [id number]  PCP: Zulema Haas MD  Consulting MD:  Treatment Team: Attending Provider: Carlito Osei MD  HPI:   Patient is a 69yoF who complex medical history as listed below, on Xarelto, who presents after a fall with subsequent head trauma. She tripped and fell earlier today, hitting her face on the floor. She does not believe she lost consciousness, but is not sure. On evaluation, she has a visible hematoma on her L forehead. CT head showed evidence of an acute R frontoparietal subdural hemorrhage, measuring 16mm in greatest thickness. Unfortunately, as mentioned above, patient is on Xarelto. Neurosurgery was consulted by ER, and patient is not currently a surgical candidate. Patient was administered FFP in the ER. Patient will need close ICU monitoring, and will be transferred to [facility] for further care. Patient's family including son, daughter-in-law, and granddaughter were at bedside, and all questions were addressed, with tentative plan explained.     Patient only complained of headache and nausea on my exam.    Complete ROS done and is as stated in HPI or otherwise negative  Past Medical History:   Diagnosis Date    Anemia     Anxiety     Back ache     Benign paroxysmal positional vertigo     Cancer ([address]     multiple myeloma    Contact dermatitis and other eczema, due to unspecified cause     Disease of esophagus     esophagitis    DVT (deep venous thrombosis) ([address]     from thalidomide    Dyslipidemia     Edema     symptoms involving skin and other integumentary tissue, edema    Esophageal reflux     FHx: malignant neoplasm of gastrointestinal tract     GERD (gastroesophageal reflux disease)     Herpes zoster     without mention of complication    History of autologous stem cell transplant ([address] 10/2013    for myeloma-IgG kappa    HLD (hyperlipidemia)     Hypercholesterolemia     [facility]     first abnormality dx -in remission    Neuropathy     Peripheral neuropathy     Primary hypercoagulable state ([facility]     Rosacea     Rosacea     Shingles     Shingles 2008    Stroke [facility])       Past Surgical History:   Procedure Laterality Date    HX  SECTION      x2    HX CHOLECYSTECTOMY  2016    HX COLONOSCOPY      ok EGD-done sldo    HX ENDOSCOPY  3/08    upper/lower endo-for heme pos stool/hiatal hernia and tics    HX HEENT  2014    thyroid cyst owvvsaj-Frmtjecsuovjxdgtk-Py. Julianna Locus    HX OTHER SURGICAL  3/06    port for venous access    HX TUBAL LIGATION      LAP,CHOLECYSTECTOMY        Cannot display prior to admission medications because the patient has not been admitted in this contact. No Known Allergies   Social History   Substance Use Topics    Smoking status: Never Smoker    Smokeless tobacco: Never Used    Alcohol use No      Family History   Problem Relation Age of Onset    Stroke Mother     Heart Failure Mother     Other Mother      TIA's    Cancer Father      colon      Objective: There were no vitals taken for this visit. Temp (24hrs), Av.8 °F (36.6 °C), Min:97.5 °F (36.4 °C), Max:98.3 °F (36.8 °C)       Physical Exam:  General:    Alert, cooperative, no distress, appears stated age. Keeps eyes closed on exam    Head:   Normocephalic, with forehead laceration closed with 5 sutures, associated hematoma. Eyes:  Closes eyes. More pronounced ptosis of L eyelid (chronic at times, per family), PERRL. EOMI. Nose:  Nares normal. No drainage or sinus tenderness. Lungs:   Clear to auscultation bilaterally. No Wheezing or Rhonchi. No rales. Heart:   Regular rate and rhythm,  no murmur, rub or gallop. Abdomen:   Soft, non-tender. Not distended. Bowel sounds normal.   Extremities: No cyanosis. No edema. No clubbing  Skin:     Texture, turgor normal. No rashes or lesions.   Not Jaundiced  Neurologic: Alert and oriented x 3, h/o mild L-sided eduardo-neglect, but equal strength compared to right on reminding patient    Data Review:   Recent Results (from the past 24 hour(s))   CBC WITH AUTOMATED DIFF    Collection Time: 06/07/18  2:37 PM   Result Value Ref Range    WBC 6.1 4.3 - 11.1 K/uL    RBC 4.16 4.05 - 5.25 M/uL    HGB 14.3 11.7 - 15.4 g/dL    HCT 41.8 35.8 - 46.3 %    .5 (H) 79.6 - 97.8 FL    MCH 34.4 (H) 26.1 - 32.9 PG    MCHC 34.2 31.4 - 35.0 g/dL    RDW 14.4 11.9 - 14.6 %    PLATELET 882 946 - 055 K/uL    MPV 10.2 (L) 10.8 - 14.1 FL    DF AUTOMATED      NEUTROPHILS 66 43 - 78 %    LYMPHOCYTES 25 13 - 44 %    MONOCYTES 8 4.0 - 12.0 %    EOSINOPHILS 1 0.5 - 7.8 %    BASOPHILS 0 0.0 - 2.0 %    IMMATURE GRANULOCYTES 0 0.0 - 5.0 %    ABS. NEUTROPHILS 4.0 1.7 - 8.2 K/UL    ABS. LYMPHOCYTES 1.5 0.5 - 4.6 K/UL    ABS. MONOCYTES 0.5 0.1 - 1.3 K/UL    ABS. EOSINOPHILS 0.0 0.0 - 0.8 K/UL    ABS. BASOPHILS 0.0 0.0 - 0.2 K/UL    ABS. IMM. GRANS. 0.0 0.0 - 0.5 K/UL   METABOLIC PANEL, COMPREHENSIVE    Collection Time: 06/07/18  2:37 PM   Result Value Ref Range    Sodium 142 136 - 145 mmol/L    Potassium 4.2 3.5 - 5.1 mmol/L    Chloride 103 98 - 107 mmol/L    CO2 30 21 - 32 mmol/L    Anion gap 9 7 - 16 mmol/L    Glucose 103 (H) 65 - 100 mg/dL    BUN 20 8 - 23 MG/DL    Creatinine 0.80 0.6 - 1.0 MG/DL    GFR est AA >60 >60 ml/min/1.73m2    GFR est non-AA >60 >60 ml/min/1.73m2    Calcium 8.8 8.3 - 10.4 MG/DL    Bilirubin, total 0.4 0.2 - 1.1 MG/DL    ALT (SGPT) 26 12 - 65 U/L    AST (SGOT) 22 15 - 37 U/L    Alk.  phosphatase 86 50 - 136 U/L    Protein, total 7.4 6.3 - 8.2 g/dL    Albumin 3.5 3.2 - 4.6 g/dL    Globulin 3.9 (H) 2.3 - 3.5 g/dL    A-G Ratio 0.9 (L) 1.2 - 3.5     PROTHROMBIN TIME + INR    Collection Time: 06/07/18  2:37 PM   Result Value Ref Range    Prothrombin time 25.3 (H) 11.5 - 14.5 sec    INR 2.2     PTT    Collection Time: 06/07/18  2:37 PM   Result Value Ref Range    aPTT 40.0 (H) 23.2 - 35.3 SEC   FFP, ALLOCATE Collection Time: 06/07/18  2:45 PM   Result Value Ref Range    Unit number [id number]     Blood component type FP 24h,Thaw     Unit division 00     Status of unit ISSUED     Unit number [id number]     Blood component type FP 24h, Thaw     Unit division 00     Status of unit ISSUED    TYPE & SCREEN    Collection Time: 06/07/18  3:15 PM   Result Value Ref Range    Crossmatch Expiration 06/10/2018     ABO/Rh(D) A POSITIVE     Antibody screen NEG    EKG, 12 LEAD, INITIAL    Collection Time: 06/07/18  3:41 PM   Result Value Ref Range    Ventricular Rate 82 BPM    Atrial Rate 82 BPM    P-R Interval 154 ms    QRS Duration 94 ms    Q-T Interval 394 ms    QTC Calculation (Bezet) 460 ms    Calculated P Axis 71 degrees    Calculated R Axis -13 degrees    Calculated T Axis 69 degrees    Diagnosis       Normal sinus rhythm  Minimal voltage criteria for LVH, may be normal variant  Borderline ECG  When compared with ECG of 25-AUG-2017 14:26,  Premature ventricular complexes are no longer Present       Imaging /Procedures /Studies     Assessment and Plan: Active Hospital Problems    Diagnosis Date Noted    Subdural hemorrhage ([facility] 06/07/2018    Hypertensive emergency 06/07/2018    Acquired hypothyroidism 03/12/2018    Depression 04/27/2017    Multiple cerebral infarctions ([address] 01/10/2017    HLD (hyperlipidemia)        PLAN  Subdural Hemorrhage  - Traumatic  - On Xarelto at home for h/o DVT/hypercoagulability  - Stopped Xarelto (and Plavix) on admission  - NS consulted  - Q1H neuro checks  - Repeat CT head tomorrow AM  - Holding potentially mentation altering medications (including Lyrica) at this time.     Hypertensive Emergency  - After my bedside evaluation, I was alerted that the patient's BP had become significantly elevated (systolic BP >674)  - Cardene drip initiated  - Will need to attempt to keep systolic BP <517 (or at least <180) to avoid worsening of intracranial hemorrhage    H/O CVA/HLD  - Multiple CVAs in past  - Known L eduardo-neglect  - Will hold Plavix  - Continue statin    Hypothyroidism  - Continue Synthroid    DISPO:  Admit to ICU for close monitoring. Guarded condition.     Signed By: Juan Francisco Vizcarra MD     June 7, 2018

## 2018-06-07 NOTE — PROGRESS NOTES
Spoke with Dr Saba Mathias from neurosurgery and updated on patient condition and CT results. Orders received . Call placed to hospitalist for orders to correct INR 2.2. Orders received. Blood bank notified of stat status of FFP for emergent crani. Consents signed.

## 2018-06-07 NOTE — PROGRESS NOTES
NS  CONSULT  FULL NOTE DICTATED  IMP: RIGHT POSTERIOR PARIETAL SDH, ACUTE  ON  BLOOD THINNERS    PLAN:  TRANSFER TO  ICU  DOWNTOWN  CT IN 12 HOURS  REVERSAL WITH FACTORS AND  FFP  CLOSE MONITORING  MAY NEED SURGERY  MANNITOL Q 6 Sears Elena MARIE

## 2018-06-08 ENCOUNTER — HOSPICE ADMISSION (OUTPATIENT)
Dept: HOSPICE | Facility: HOSPICE | Age: 73
End: 2018-06-08

## 2018-06-08 ENCOUNTER — APPOINTMENT (OUTPATIENT)
Dept: CT IMAGING | Age: 73
DRG: 085 | End: 2018-06-08
Attending: FAMILY MEDICINE
Payer: MEDICARE

## 2018-06-08 VITALS
HEART RATE: 105 BPM | TEMPERATURE: 98.3 F | DIASTOLIC BLOOD PRESSURE: 76 MMHG | SYSTOLIC BLOOD PRESSURE: 159 MMHG | OXYGEN SATURATION: 97 % | RESPIRATION RATE: 20 BRPM

## 2018-06-08 LAB
ATRIAL RATE: 82 BPM
CALCULATED P AXIS, ECG09: 71 DEGREES
CALCULATED R AXIS, ECG10: -13 DEGREES
CALCULATED T AXIS, ECG11: 69 DEGREES
DIAGNOSIS, 93000: NORMAL
P-R INTERVAL, ECG05: 154 MS
Q-T INTERVAL, ECG07: 394 MS
QRS DURATION, ECG06: 94 MS
QTC CALCULATION (BEZET), ECG08: 460 MS
VENTRICULAR RATE, ECG03: 82 BPM

## 2018-06-08 PROCEDURE — 74011250637 HC RX REV CODE- 250/637: Performed by: INTERNAL MEDICINE

## 2018-06-08 PROCEDURE — 74011000250 HC RX REV CODE- 250: Performed by: NEUROLOGICAL SURGERY

## 2018-06-08 PROCEDURE — 74011250636 HC RX REV CODE- 250/636: Performed by: INTERNAL MEDICINE

## 2018-06-08 RX ORDER — LORAZEPAM 2 MG/ML
0.5 CONCENTRATE ORAL
Qty: 1 BOTTLE | Refills: 0 | Status: SHIPPED | OUTPATIENT
Start: 2018-06-08

## 2018-06-08 RX ORDER — MORPHINE SULFATE 20 MG/ML
10 SOLUTION ORAL
Qty: 1 BOTTLE | Refills: 0 | Status: SHIPPED | OUTPATIENT
Start: 2018-06-08

## 2018-06-08 RX ORDER — MORPHINE SULFATE 10 MG/ML
1 INJECTION, SOLUTION INTRAMUSCULAR; INTRAVENOUS
Status: DISCONTINUED | OUTPATIENT
Start: 2018-06-08 | End: 2018-06-08 | Stop reason: HOSPADM

## 2018-06-08 RX ORDER — GLYCOPYRROLATE 0.2 MG/ML
0.1 INJECTION INTRAMUSCULAR; INTRAVENOUS
Status: DISCONTINUED | OUTPATIENT
Start: 2018-06-08 | End: 2018-06-08 | Stop reason: HOSPADM

## 2018-06-08 RX ORDER — LORAZEPAM 2 MG/ML
1 INJECTION INTRAMUSCULAR
Status: DISCONTINUED | OUTPATIENT
Start: 2018-06-08 | End: 2018-06-08 | Stop reason: HOSPADM

## 2018-06-08 RX ADMIN — LORAZEPAM 1 MG: 2 INJECTION INTRAMUSCULAR; INTRAVENOUS at 15:03

## 2018-06-08 RX ADMIN — Medication 1 SPRAY: at 15:03

## 2018-06-08 RX ADMIN — MORPHINE SULFATE 1 MG: 10 INJECTION INTRAMUSCULAR; INTRAVENOUS; SUBCUTANEOUS at 11:52

## 2018-06-08 RX ADMIN — GLYCOPYRROLATE 0.1 MG: 0.2 INJECTION, SOLUTION INTRAMUSCULAR; INTRAVENOUS at 08:41

## 2018-06-08 RX ADMIN — Medication 10 ML: at 14:08

## 2018-06-08 NOTE — PROGRESS NOTES
Referral to Daron, liaison, with [facility]  States she will meet with family soon.

## 2018-06-08 NOTE — PROGRESS NOTES
Report received from [facility];  Pt is comfort measures with family at bedside. Pt is responsive and denies pain at this time. NC in place with sats around 77%, /80, HR 82. Family asking about hospice care, so will direct to  for service info.

## 2018-06-08 NOTE — PROGRESS NOTES
Met with family after family meeting with Daron, Milla Pavon. They were not happy and would like to go home instead of [facility]. Family would like referral to [facility] and information regarding caregivers. Brochures given and information about private duty caregivers. Referral made to [facility] and clinical faxed. Spoke with Clark Brumfield and she will set up family meeting if family would like or they can meet them at home. CM following to assist with d/c needs.

## 2018-06-08 NOTE — PROGRESS NOTES
Pt was a resident in a memory care facility. She recently lost her . She fell at the care facility, her head was bleeding so she was taken to HOSPITAL [address], then transferred to . Erik-surgeon determined that, due to the amount of internal  Bleeding & the fact that she took blood thinners, surgery to stop the bleeding would not be a solution for pt's recovery. Staff called to request spiritual care after the surgeon spoke with the family. Pt's 2 children and 4 grandchildren will lose 2 parent figures within the course of a few weeks. Grandchildren are teens & 1 tween. Teens acted out verbally, it was their grief response. (Adult) Children were realistic about the state of their mother's health, which was a blessing for the ICU staff. Spiritual care work: 1. Normalize feelings, needed due to the unexpected diagnosis (Pt's facility phoned the pt's children to say pt had fallen and needed a few stitches). Several family members were overwhelmed by their feelings, but each member was supported by other members and appeared to be as spiritually / emotionally stable as possible, given the unexpected prognosis for recovery. 2.  Assure the family that [address] [place] [address] cares about them as well as their loved one and that we intend to care well for them and the pt. 3.  Ministry of presence & prayer to demonstrate caring and concern, convey emotional and spiritual support. Hospitalist was extremely caring and comforting, and spoke with family members about the pt's health in a compassionate way that was extremely easy for them to understand. Nurse provided exemplary care to pt and to family members, and offered me valuable information so I could assess and offer appropriate care effectively.       Chaplain Marisa Patel MDiv,M,PhD

## 2018-06-08 NOTE — PROGRESS NOTES
Patient received already [facility] and 2 units of FFP back at [facility]. For now, I am cancelling FFP and [facility] order to be given at ICU. Spoke with pharmacist.  Anesthesia is evaluating patient now.

## 2018-06-08 NOTE — ANESTHESIA PREPROCEDURE EVALUATION
Anesthetic History   No history of anesthetic complications            Review of Systems / Medical History  Patient summary reviewed and pertinent labs reviewed    Pulmonary  Within defined limits                 Neuro/Psych       CVA (Currently posturing with bilateral upper extremities)  Psychiatric history    Comments: Neuropathy Cardiovascular                  Exercise tolerance: >4 METS  Comments: Family cardiac history   GI/Hepatic/Renal     GERD: well controlled      PUD     Endo/Other        Cancer (Multiple myeloma) and anemia     Other Findings   Comments: Multiple Myeloma s/p SCT    Esophagitis         Physical Exam    Airway  Mallampati: III  TM Distance: < 4 cm  Neck ROM: normal range of motion   Mouth opening: Normal     Cardiovascular    Rhythm: regular  Rate: normal         Dental  No notable dental hx       Pulmonary  Breath sounds clear to auscultation               Abdominal  GI exam deferred       Other Findings            Anesthetic Plan    ASA: 3, emergent  Anesthesia type: general    Monitoring Plan: Arterial line      Induction: Intravenous  Anesthetic plan and risks discussed with: Patient

## 2018-06-08 NOTE — PROGRESS NOTES
06/08/18 1151   Dual Skin Pressure Injury Assessment   Dual Skin Pressure Injury Assessment WDL   Second Care Provider (Based on [address] [place] [address]) Loran Paget, RN     Dual skin assessment with Loran Paget, RN upon admission to the floor. Laceration noted to L brow w/stitches, ecchymosis to L eye, R knee with edema, ecchymosis and abrasion, R hand with ecchymosis as well. Allevyn placed to sacrum for prevention, no breakdown noted.

## 2018-06-08 NOTE — PROGRESS NOTES
Pt discharged Home with Hospice, CM has set up everything for home, family denies any questions, IVs removed, pt is going home with bynum catheter.

## 2018-06-08 NOTE — PROGRESS NOTES
Met with family on 5th floor, Daughter wants, [facility] now and [facility] for caregivers. Ry Dc given information and taking over and will send info to [employer]. Family appreciative of help.

## 2018-06-08 NOTE — PROGRESS NOTES
LEAPFROG PROTOCOL NOTE    Eddie Milling  6/8/2018    The patient is currently in the critical care setting managed by Dr. Gwen Laguna with [facility]. The patient's chart is reviewed and the patient is discussed with the staff. Patient is currently hemodynamically stable. Patient has no needs identified for Intensivist management in the critical care setting at this time. Please notify us if can be of assistance. No charge billed to the patient. Thank you.     Jayjay Barker MD

## 2018-06-08 NOTE — PROGRESS NOTES
Had long discussion with family. Pt. has been in memory care and at times unable to feed herself. Been slowly declining neurologically. Her repeat CT shows a significant increase in the SDH with shift and cerebral swelling. Her last INR was 2.2 and she has been on Xarelto AND coumadin. Exam:  Decorticate. Right pupil NR (Cataract?) and left pupil 4mm and minimal reactive.  +gag and corneals. A:  Acute R SDH rapidly progressive expansion with significant brain injury    P:  After discussing pros and cons with the family, and offering them decompression, they have decided to not proceed with aggressive care. I am in complete agreement with this. Also discussed with Dr. Edwin Montes.

## 2018-06-08 NOTE — PROGRESS NOTES
TRANSFER - IN REPORT:    Verbal report received from KATY Burks on Leannaell Billing  being received from ICU for routine progression of care      Report consisted of patients Situation, Background, Assessment and   Recommendations(SBAR). Information from the following report(s) SBAR was reviewed with the receiving nurse. Opportunity for questions and clarification was provided. Assessment will be completed upon patients arrival to unit and care will be assumed.

## 2018-06-08 NOTE — PROGRESS NOTES
Hospitalist Progress Note     Admit Date:  2018  6:47 PM   Name:  Rudy Lin   Age:  74 y.o.  :  1945   MRN:  [id number]   PCP:  Jenny Brower MD  Treatment Team: Attending Provider: Elva Stark MD; Utilization Review: Demetrius Radford RN     is a 73 yo female who presented s/p fall on a background of hyperlipidemia, CVA, depression, hypothyroidism, peripheral neuropathy, myeloma, GERD, neuropathy, dyslipidemia, UTI, and DVT. CT brain scan confirmed a right-sided posterior parietal acute subdural hematoma. The patient was seen by Neurosurgery who agreed to hospice care. Subjective: The patient is oriented to name and place but is otherwise lethargic. I personally spoke to her family. The family consents to hospice care (hospice team was called).      Objective:   Patient Vitals for the past 24 hrs:   Temp Pulse Resp BP SpO2   18 0830 - (!) 105 20 159/76 97 %   18 0700 - (!) 106 (!) 31 169/78 (!) 89 %   18 0601 - (!) 102 20 157/73 90 %   18 0500 - (!) 102 18 155/74 93 %   18 0401 - (!) 101 17 148/70 94 %   18 0300 - (!) 107 20 150/69 100 %   18 0200 - (!) 104 18 149/70 100 %   18 0100 - 98 16 156/72 100 %   18 0038 - (!) 102 17 - 100 %   18 0000 98.3 °F (36.8 °C) - - - -   18 2348 - (!) 101 16 - 100 %   18 2330 - 99 14 - 100 %   18 2300 - 100 15 125/58 99 %   18 2245 - (!) 108 16 115/58 91 %   18 2230 - (!) 101 16 119/57 96 %   18 2215 - 100 20 116/58 99 %   18 2200 - (!) 101 15 114/59 99 %   062146 - (!) 106 15 113/53 (!) 60 %   18 - (!) 108 18 112/56 97 %   18 - (!) 104 19 123/58 94 %   18 - (!) 102 (!) 59 119/58 100 %   18 - (!) 104 (!) 39 128/50 100 %   18 - (!) 101 19 131/63 98 %   18 - (!) 101 16 130/61 96 %   18 97.9 °F (36.6 °C) (!) 104 30 149/65 100 %   18 194 - (!) 102 16 145/58 99 %   06/07/18 1931 - (!) 101 17 147/67 100 %   06/07/18 1922 - (!) 101 20 145/65 98 %   06/07/18 1915 - (!) 102 23 150/67 100 %     Oxygen Therapy  O2 Sat (%): 97 % (06/08/18 0830)  Pulse via Oximetry: 106 beats per minute (06/08/18 0830)  O2 Device: Nasal cannula (06/07/18 1930)  O2 Flow Rate (L/min): 3 l/min (06/07/18 1930)    Intake/Output Summary (Last 24 hours) at 06/08/18 0902  Last data filed at 06/08/18 2343   Gross per 24 hour   Intake                0 ml   Output             1600 ml   Net            -1600 ml           General appearance: NAD, lethargic, left eyebrow laceration  Eyes: anicteric sclerae, moist conjunctivae; no lid-lag, bilateral pupils now slowly reactive  ENT: Atraumatic; oropharynx clear with moist mucous membranes and no mucosal ulcerations; normal hard and soft palate  Neck: Trachea midline   FROM, supple, no thyromegaly or lymphadenopathy  Lungs: CTA, with normal respiratory effort and no intercostal retractions  CV: S1,S2 present, no added murmurs  Abdomen: Soft, non-tender; no masses   Extremities: No peripheral edema or extremity lymphadenopathy  Skin: Normal temperature, turgor and texture  Psych: Alert and oriented to person, place, lethargic      Data Review:  I have reviewed all labs, meds, telemetry events, and studies from the last 24 hours. Recent Results (from the past 24 hour(s))   CBC WITH AUTOMATED DIFF    Collection Time: 06/07/18  2:37 PM   Result Value Ref Range    WBC 6.1 4.3 - 11.1 K/uL    RBC 4.16 4.05 - 5.25 M/uL    HGB 14.3 11.7 - 15.4 g/dL    HCT 41.8 35.8 - 46.3 %    .5 (H) 79.6 - 97.8 FL    MCH 34.4 (H) 26.1 - 32.9 PG    MCHC 34.2 31.4 - 35.0 g/dL    RDW 14.4 11.9 - 14.6 %    PLATELET 257 955 - 138 K/uL    MPV 10.2 (L) 10.8 - 14.1 FL    DF AUTOMATED      NEUTROPHILS 66 43 - 78 %    LYMPHOCYTES 25 13 - 44 %    MONOCYTES 8 4.0 - 12.0 %    EOSINOPHILS 1 0.5 - 7.8 %    BASOPHILS 0 0.0 - 2.0 %    IMMATURE GRANULOCYTES 0 0.0 - 5.0 %    ABS.  NEUTROPHILS 4.0 1.7 - 8.2 K/UL    ABS. LYMPHOCYTES 1.5 0.5 - 4.6 K/UL    ABS. MONOCYTES 0.5 0.1 - 1.3 K/UL    ABS. EOSINOPHILS 0.0 0.0 - 0.8 K/UL    ABS. BASOPHILS 0.0 0.0 - 0.2 K/UL    ABS. IMM. GRANS. 0.0 0.0 - 0.5 K/UL   METABOLIC PANEL, COMPREHENSIVE    Collection Time: 06/07/18  2:37 PM   Result Value Ref Range    Sodium 142 136 - 145 mmol/L    Potassium 4.2 3.5 - 5.1 mmol/L    Chloride 103 98 - 107 mmol/L    CO2 30 21 - 32 mmol/L    Anion gap 9 7 - 16 mmol/L    Glucose 103 (H) 65 - 100 mg/dL    BUN 20 8 - 23 MG/DL    Creatinine 0.80 0.6 - 1.0 MG/DL    GFR est AA >60 >60 ml/min/1.73m2    GFR est non-AA >60 >60 ml/min/1.73m2    Calcium 8.8 8.3 - 10.4 MG/DL    Bilirubin, total 0.4 0.2 - 1.1 MG/DL    ALT (SGPT) 26 12 - 65 U/L    AST (SGOT) 22 15 - 37 U/L    Alk.  phosphatase 86 50 - 136 U/L    Protein, total 7.4 6.3 - 8.2 g/dL    Albumin 3.5 3.2 - 4.6 g/dL    Globulin 3.9 (H) 2.3 - 3.5 g/dL    A-G Ratio 0.9 (L) 1.2 - 3.5     PROTHROMBIN TIME + INR    Collection Time: 06/07/18  2:37 PM   Result Value Ref Range    Prothrombin time 25.3 (H) 11.5 - 14.5 sec    INR 2.2     PTT    Collection Time: 06/07/18  2:37 PM   Result Value Ref Range    aPTT 40.0 (H) 23.2 - 35.3 SEC   FFP, ALLOCATE    Collection Time: 06/07/18  2:45 PM   Result Value Ref Range    Unit number [id number]     Blood component type FP 24h,Thaw     Unit division 00     Status of unit ISSUED     Unit number [id number]     Blood component type FP 24h, Thaw     Unit division 00     Status of unit ISSUED    TYPE & SCREEN    Collection Time: 06/07/18  3:15 PM   Result Value Ref Range    Crossmatch Expiration 06/10/2018     ABO/Rh(D) A POSITIVE     Antibody screen NEG    EKG, 12 LEAD, INITIAL    Collection Time: 06/07/18  3:41 PM   Result Value Ref Range    Ventricular Rate 82 BPM    Atrial Rate 82 BPM    P-R Interval 154 ms    QRS Duration 94 ms    Q-T Interval 394 ms    QTC Calculation (Bezet) 460 ms    Calculated P Axis 71 degrees    Calculated R Axis -13 degrees Calculated T Axis 69 degrees    Diagnosis       Normal sinus rhythm  Minimal voltage criteria for LVH, may be normal variant  Borderline ECG  When compared with ECG of 25-AUG-2017 14:26,  Premature ventricular complexes are no longer Present  Confirmed by Ramírez Alas ([id number]) on 6/8/2018 7:12:39 AM     PROTHROMBIN TIME + INR    Collection Time: 06/07/18  8:17 PM   Result Value Ref Range    Prothrombin time 16.1 (H) 11.5 - 14.5 sec    INR 1.4          All Micro Results     None          Current Meds:  Current Facility-Administered Medications   Medication Dose Route Frequency    glycopyrrolate (ROBINUL) injection 0.1 mg  0.1 mg IntraVENous TID PRN    LORazepam (ATIVAN) injection 1 mg  1 mg IntraVENous Q2H PRN    morphine injection 1 mg  1 mg IntraVENous Q2H PRN    sodium chloride (NS) flush 5-10 mL  5-10 mL IntraVENous Q8H    sodium chloride (NS) flush 5-10 mL  5-10 mL IntraVENous PRN    0.9% sodium chloride infusion 250 mL  250 mL IntraVENous PRN       Other Studies (last 24 hours):  Ct Head Wo Cont    Result Date: 6/7/2018  CT HEAD WITHOUT CONTRAST. INDICATION: Acute subdural hemorrhage with worsening mental status. COMPARISON: None. TECHNIQUE:   5 mm axial scans from the skull base to the vertex. Our CT scanners use one or more of the following:  Automated exposure control, adjustment of the mA and or kV according to patient size, iterative reconstruction. IMPRESSION: The large right subdural hematoma has increased. There is 15 mm leftward midline shift. Right lateral ventricle is effaced. Results telephoned by myself immediately to [facility] in the ICU. Ct Head Wo Cont    Result Date: 6/7/2018  Head CT INDICATION:  Fall, head injury Multiple axial images obtained through the brain without intravenous contrast. Radiation dose reduction techniques were used for this study:   All CT scans performed at this facility use one or all of the following: Automated exposure control, adjustment of the mA and/or kVp according to patient's size, iterative reconstruction. FINDINGS: There is an acute right frontoparietal subdural hemorrhage. Measures 16 mm in greatest thickness in the right parietal region. There is mild mass effect on the adjacent brain and lateral ventricle. There is no significant midline shift. There is no evidence of acute infarction. There is a old right occipital infarct. There are also chronic changes in the white matter. The sinuses are clear. There are no bony lesions. IMPRESSION: Acute right frontoparietal subdural hemorrhage.  Critical results called to Dr. Bill Begum  at 2:04          Review of Systems:  Unable to answer    Assessment and Plan:     Hospital Problems as of 6/8/2018  Date Reviewed: 4/26/2018          Codes Class Noted - Resolved POA    Subdural hemorrhage ([facility] ICD-10-CM: I62.00  ICD-9-CM: 432.1  6/7/2018 - Present Yes        * (Principal)Hypertensive emergency ICD-10-CM: I16.1  ICD-9-CM: 401.9  6/7/2018 - Present Unknown        Acquired hypothyroidism ICD-10-CM: E03.9  ICD-9-CM: 244.9  3/12/2018 - Present Yes        Depression ICD-10-CM: F32.9  ICD-9-CM: 823  4/27/2017 - Present Yes        Multiple cerebral infarctions ([facility] ICD-10-CM: I63.9  ICD-9-CM: 434.91  1/10/2017 - Present Yes        IgG multiple myeloma ([facility] ICD-10-CM: C90.00  ICD-9-CM: 203.00  1/20/2016 - Present     Overview Signed 1/20/2016 10:02 AM by Dawn Max MD     kappa-prior stem cell transplant-M spike 0.2 12/2015             HLD (hyperlipidemia) ICD-10-CM: E78.5  ICD-9-CM: 272.4  Unknown - Present Yes              PLAN:    Right subdural hematoma: 15mm leftward shift, right lateral ventricle effected, seen by Neurosurgery  Family transitioned patient to hospice care  Pain control, Ativan for agitation  Hospice team called  Will stop lab draws    Signed:  Analisa Palmer MD

## 2018-06-08 NOTE — PROGRESS NOTES
TRANSFER - IN REPORT:    Verbal report received from EMS(name) on Maldonado Wilde  being received from [facility](unit) for urgent transfer      Report consisted of patients Situation, Background, Assessment and   Recommendations(SBAR). Information from the following report(s) SBAR, Kardex, Intake/Output, Recent Results, Med Rec Status and Cardiac Rhythm NST was reviewed with the receiving nurse. Opportunity for questions and clarification was provided. Assessment completed upon patients arrival to unit and care assumed. Pt. Has a forehead laceration with sutures placed at [address]. Also noted an abrasion on the right knee, assumed from the same fall that resulted in the forehead wound. Also has small bruises scattered on BLE. Sacrum/coccyx, heels, and other bony prominance areas are intact, no signs of pressure ulcer or related injuries.

## 2018-06-08 NOTE — PROGRESS NOTES
TRANSFER - OUT REPORT:    Verbal report given to KATY Sandoval(name) on Dilshad Levo  being transferred to [facility](unit) for change in patient condition(comfort measures only)       Report consisted of patients Situation, Background, Assessment and   Recommendations(SBAR). Information from the following report(s) SBAR, Kardex, Intake/Output, MAR and Med Rec Status was reviewed with the receiving nurse. Lines:       Opportunity for questions and clarification was provided.       Patient transported with:   O2 @ 2 liters

## 2018-06-08 NOTE — PROGRESS NOTES
[facility] staff on route to [address] [place] [address] to evaluate patient for home hospice.

## 2018-06-08 NOTE — PROGRESS NOTES
Patient and family are resting.   Will check on them later    Catalina Buenrostro, staff [address], [facility]  /   [email or web address]

## 2018-06-08 NOTE — PROGRESS NOTES
Spoke with [facility], Interim, [phone]. States she has spoke with pt's son and they can meet at 1:30pm if family would like or go ahead and plan for evening admission at home. Pt on will call with [employer] and paperwork ready.  CM called to [facility], RN primary nurse on 5th floor now,  and updated on POC and number given for any assist.

## 2018-06-08 NOTE — PROGRESS NOTES
NS  FAMILY HAS DECIDED TO MAKE PATIENT DNR  COMFORT MEASURES  SPOKEN TO SON TODAY  WILL SIGN OFF FOR Connie Figueroa MD

## 2018-06-08 NOTE — PROGRESS NOTES
A follow up visit was made to the patient. Emotional support, spiritual presence and   prayer were provided for the patient and her children.       [employer]

## 2018-06-08 NOTE — CONSULTS
[address]    Charmayne Albany  MR#: [id number]  : 1945  ACCOUNT #: [de-identified]   DATE OF SERVICE: 2018    CHIEF COMPLAINT:  Headache times hours. HISTORY OF PRESENT ILLNESS:  A 49-year-old lady on Xarelto for previous CVA, who had a fall at the [facility] and lacerated the left orbital region. This was repaired in the emergency department. CT brain scan confirmed a right-sided posterior parietal acute subdural hematoma. She was transferred to the [facility] for neurology and neurosurgery care. ALLERGIES:  NONE. PAST MEDICAL HISTORY:  Significant for hyperlipidemia, CVA, depression, hypothyroidism, peripheral neuropathy, myeloma, GERD, neuropathy, dyslipidemia, UTI, and DVT. FAMILY HISTORY:  Positive for cancer, heart failure, and stroke. SOCIAL HISTORY:  She is nonsmoker, nondrinker. She is a . PHYSICAL EXAMINATION:  She is somewhat somnolent, but did receive IV morphine prior to my examination in the emergency department. She opens her eyes and does follow commands. There is some left-sided weakness when compared to the right, although she did have a previous CVA. Repaired laceration over the left orbit with some ecchymosis noted. No obvious scalp or skull injuries. IMPRESSION:  A 49-year-old lady status post fall, on blood thinners, with right subdural hematoma. RECOMMENDATIONS:  The emergency department has attempted to reverse the anticoagulation with factor infusion as well as FFP infusion. She will transfer to the [facility] facility. Repeat CT brain scan in 12 hours. If things worsen, she may need surgery. The family understands and agrees to proceed.       MD JAMES Levy / Holley Landry  D: 2018 19:26     T: 2018 19:37  JOB #: [id number]

## 2018-06-08 NOTE — PROGRESS NOTES
Told by family member to contact Mario Armas at [facility] / ghada Neri at [facility], initially family had agreed to [facility] / [facility] / now family has decided to go with [facility] / home with hospice. Spoke with Mario Armas ([phone])  Will send information requested for their review.

## 2018-06-08 NOTE — PROGRESS NOTES
Physical Therapy Note:    Participated in interdisciplinary rounds including [place], Wound Care, Palliative Care NP, RN staff, MD, Respiratory therapy, and Nutrition. Patient/family pursuing comfort measures at this time and pt not appropriate for PT. Will continue to participate in rounds to determine appropriateness of PT/OT.     Thank you,  Iram Pi, PT, DPT

## 2018-06-08 NOTE — DISCHARGE SUMMARY
Hospitalist Discharge Summary     Admit Date:  2018  6:47 PM   Name:  Madalyn Vázquez   Age:  74 y.o.  :  1945   MRN:  [id number]   PCP:  Junie Singh MD  Treatment Team: Attending Provider: Dionna Morgan MD; Utilization Review: Vicki Myrick RN; Care Manager: Og Magallon RN    Problem List for this Hospitalization:  Hospital Problems as of 2018  Date Reviewed: 2018          Codes Class Noted - Resolved POA    Subdural hemorrhage ([facility] ICD-10-CM: I62.00  ICD-9-CM: 432.1  2018 - Present Yes        * (Principal)Hypertensive emergency ICD-10-CM: I16.1  ICD-9-CM: 401.9  2018 - Present Unknown        Acquired hypothyroidism ICD-10-CM: E03.9  ICD-9-CM: 244.9  3/12/2018 - Present Yes        Depression ICD-10-CM: F32.9  ICD-9-CM: 800  2017 - Present Yes        Multiple cerebral infarctions ([facility] ICD-10-CM: I63.9  ICD-9-CM: 434.91  1/10/2017 - Present Yes        IgG multiple myeloma ([facility] ICD-10-CM: C90.00  ICD-9-CM: 203.00  2016 - Present     Overview Signed 2016 10:02 AM by Junie Singh MD     kappa-prior stem cell transplant-M spike 0.2 2015             HLD (hyperlipidemia) ICD-10-CM: E78.5  ICD-9-CM: 272.4  Unknown - Present Yes                Admission HPI from 2018:    \" Patient is a 69yoF who complex medical history as listed below, on Xarelto, who presents after a fall with subsequent head trauma. She tripped and fell earlier today, hitting her face on the floor. She does not believe she lost consciousness, but is not sure. On evaluation, she has a visible hematoma on her L forehead. CT head showed evidence of an acute R frontoparietal subdural hemorrhage, measuring 16mm in greatest thickness. Unfortunately, as mentioned above, patient is on Xarelto. Neurosurgery was consulted by ER, and patient is not currently a surgical candidate. Patient was administered FFP in the ER.   Patient will need close ICU monitoring, and will be transferred to [facility] for further care.     Patient's family including son, daughter-in-law, and granddaughter were at bedside, and all questions were addressed, with tentative plan explained.     Patient only complained of headache and nausea on my exam. \"    Hospital Course:   is a 73yoF who complex medical history as listed below, on Xarelto, who presented after a fall with subsequent head trauma. CT brain scan confirmed a right-sided posterior parietal acute subdural hematoma. The patient was seen by Neurosurgery who agreed to hospice care. She was discharged to hospice house on 6/8/2018. Follow up instructions below. Plan was discussed with the patient and family. All questions answered. Diagnostic Imaging/Tests:   Ct Head Wo Cont    Result Date: 6/7/2018  CT HEAD WITHOUT CONTRAST. INDICATION: Acute subdural hemorrhage with worsening mental status. COMPARISON: None. TECHNIQUE:   5 mm axial scans from the skull base to the vertex. Our CT scanners use one or more of the following:  Automated exposure control, adjustment of the mA and or kV according to patient size, iterative reconstruction. IMPRESSION: The large right subdural hematoma has increased. There is 15 mm leftward midline shift. Right lateral ventricle is effaced. Results telephoned by myself immediately to [facility] in the ICU. Ct Head Wo Cont    Result Date: 6/7/2018  Head CT INDICATION:  Fall, head injury Multiple axial images obtained through the brain without intravenous contrast. Radiation dose reduction techniques were used for this study: All CT scans performed at this facility use one or all of the following: Automated exposure control, adjustment of the mA and/or kVp according to patient's size, iterative reconstruction. FINDINGS: There is an acute right frontoparietal subdural hemorrhage. Measures 16 mm in greatest thickness in the right parietal region. There is mild mass effect on the adjacent brain and lateral ventricle.   There is no significant midline shift. There is no evidence of acute infarction. There is a old right occipital infarct. There are also chronic changes in the white matter. The sinuses are clear. There are no bony lesions. IMPRESSION: Acute right frontoparietal subdural hemorrhage. Critical results called to Dr. Lillian Roldan  at 2:04        Echocardiogram results:  No results found for this visit on 06/07/18.       All Micro Results     None          Labs: Results:       BMP, Mg, Phos Recent Labs      06/07/18   1437   NA  142   K  4.2   CL  103   CO2  30   AGAP  9   BUN  20   CREA  0.80   CA  8.8   GLU  103*      CBC Recent Labs      06/07/18   1437   WBC  6.1   RBC  4.16   HGB  14.3   HCT  41.8   PLT  193   GRANS  66   LYMPH  25   EOS  1   MONOS  8   BASOS  0   IG  0   ANEU  4.0   ABL  1.5   ANJELICA  0.0   ABM  0.5   ABB  0.0   AIG  0.0      LFT Recent Labs      06/07/18   1437   SGOT  22   ALT  26   AP  86   TP  7.4   ALB  3.5   GLOB  3.9*   AGRAT  0.9*      Cardiac Testing Lab Results   Component Value Date/Time    BNP 8 01/30/2012 08:32 AM    Troponin-I <0.05 01/30/2012 08:32 AM    Troponin-I, Qt. <0.04 08/25/2017 01:44 PM    Troponin-I, Qt. 0.03 07/08/2017 01:41 PM      Coagulation Tests Lab Results   Component Value Date/Time    Prothrombin time 16.1 (H) 06/07/2018 08:17 PM    Prothrombin time 25.3 (H) 06/07/2018 02:37 PM    Prothrombin time 10.2 01/05/2017 08:50 PM    INR 1.4 06/07/2018 08:17 PM    INR 2.2 06/07/2018 02:37 PM    INR 0.9 01/05/2017 08:50 PM    aPTT 40.0 (H) 06/07/2018 02:37 PM      A1c No results found for: HBA1C, HGBE8, [id number]   Lipid Panel Lab Results   Component Value Date/Time    Cholesterol, total 123 07/22/2016 10:17 AM    HDL Cholesterol 49 07/22/2016 10:17 AM    LDL, calculated 48 07/22/2016 10:17 AM    VLDL, calculated 26 07/22/2016 10:17 AM    Triglyceride 128 07/22/2016 10:17 AM      Thyroid Panel Lab Results   Component Value Date/Time    TSH 4.580 (H) 12/11/2017 10:51 AM        Most Recent UA Lab Results   Component Value Date/Time    Color ORANGE 04/15/2018 05:52 PM    Appearance TURBID 04/15/2018 05:52 PM    Specific gravity 1.034 (H) 04/15/2018 05:52 PM    pH (UA) 6.0 04/15/2018 05:52 PM    Protein 30 (A) 04/15/2018 05:52 PM    Glucose NEGATIVE  04/15/2018 05:52 PM    Ketone TRACE (A) 04/15/2018 05:52 PM    Bilirubin SMALL (A) 04/15/2018 05:52 PM    Blood NEGATIVE  04/15/2018 05:52 PM    Urobilinogen 1.0 04/15/2018 05:52 PM    Nitrites NEGATIVE  04/15/2018 05:52 PM    Leukocyte Esterase MODERATE (A) 04/15/2018 05:52 PM        No Known Allergies  Immunization History   Administered Date(s) Administered    DTaP-Hep B-IPV 11/14/2014, 01/15/2015, 03/16/2015    Hib (PRP-T) 05/13/2014, 07/14/2014, 09/15/2014    Influenza High Dose Vaccine PF 11/14/2014, 11/16/2015, 11/11/2017    Influenza Nasal Vaccine 12/12/2016    Influenza Vaccine 10/05/2015    MMR 11/16/2015    Meningococcal (MCV4P) Vaccine 11/16/2015    Pneumococcal Conjugate (PCV-13) 05/13/2014, 07/14/2014, 09/15/2014, 05/01/2015    Pneumococcal Polysaccharide (PPSV-23) 11/14/2014    Pneumococcal Vaccine (Unspecified Type) 01/01/2014    TB Skin Test (PPD) Intradermal 08/25/2017       All Labs from Last 24 Hrs:  Recent Results (from the past 24 hour(s))   CBC WITH AUTOMATED DIFF    Collection Time: 06/07/18  2:37 PM   Result Value Ref Range    WBC 6.1 4.3 - 11.1 K/uL    RBC 4.16 4.05 - 5.25 M/uL    HGB 14.3 11.7 - 15.4 g/dL    HCT 41.8 35.8 - 46.3 %    .5 (H) 79.6 - 97.8 FL    MCH 34.4 (H) 26.1 - 32.9 PG    MCHC 34.2 31.4 - 35.0 g/dL    RDW 14.4 11.9 - 14.6 %    PLATELET 365 548 - 835 K/uL    MPV 10.2 (L) 10.8 - 14.1 FL    DF AUTOMATED      NEUTROPHILS 66 43 - 78 %    LYMPHOCYTES 25 13 - 44 %    MONOCYTES 8 4.0 - 12.0 %    EOSINOPHILS 1 0.5 - 7.8 %    BASOPHILS 0 0.0 - 2.0 %    IMMATURE GRANULOCYTES 0 0.0 - 5.0 %    ABS. NEUTROPHILS 4.0 1.7 - 8.2 K/UL    ABS. LYMPHOCYTES 1.5 0.5 - 4.6 K/UL    ABS.  MONOCYTES 0.5 0.1 - 1.3 K/UL    ABS. EOSINOPHILS 0.0 0.0 - 0.8 K/UL    ABS. BASOPHILS 0.0 0.0 - 0.2 K/UL    ABS. IMM. GRANS. 0.0 0.0 - 0.5 K/UL   METABOLIC PANEL, COMPREHENSIVE    Collection Time: 06/07/18  2:37 PM   Result Value Ref Range    Sodium 142 136 - 145 mmol/L    Potassium 4.2 3.5 - 5.1 mmol/L    Chloride 103 98 - 107 mmol/L    CO2 30 21 - 32 mmol/L    Anion gap 9 7 - 16 mmol/L    Glucose 103 (H) 65 - 100 mg/dL    BUN 20 8 - 23 MG/DL    Creatinine 0.80 0.6 - 1.0 MG/DL    GFR est AA >60 >60 ml/min/1.73m2    GFR est non-AA >60 >60 ml/min/1.73m2    Calcium 8.8 8.3 - 10.4 MG/DL    Bilirubin, total 0.4 0.2 - 1.1 MG/DL    ALT (SGPT) 26 12 - 65 U/L    AST (SGOT) 22 15 - 37 U/L    Alk.  phosphatase 86 50 - 136 U/L    Protein, total 7.4 6.3 - 8.2 g/dL    Albumin 3.5 3.2 - 4.6 g/dL    Globulin 3.9 (H) 2.3 - 3.5 g/dL    A-G Ratio 0.9 (L) 1.2 - 3.5     PROTHROMBIN TIME + INR    Collection Time: 06/07/18  2:37 PM   Result Value Ref Range    Prothrombin time 25.3 (H) 11.5 - 14.5 sec    INR 2.2     PTT    Collection Time: 06/07/18  2:37 PM   Result Value Ref Range    aPTT 40.0 (H) 23.2 - 35.3 SEC   FFP, ALLOCATE    Collection Time: 06/07/18  2:45 PM   Result Value Ref Range    Unit number [id number]     Blood component type FP 24h,Thaw     Unit division 00     Status of unit ISSUED     Unit number [id number]     Blood component type FP 24h, Thaw     Unit division 00     Status of unit ISSUED    TYPE & SCREEN    Collection Time: 06/07/18  3:15 PM   Result Value Ref Range    Crossmatch Expiration 06/10/2018     ABO/Rh(D) A POSITIVE     Antibody screen NEG    EKG, 12 LEAD, INITIAL    Collection Time: 06/07/18  3:41 PM   Result Value Ref Range    Ventricular Rate 82 BPM    Atrial Rate 82 BPM    P-R Interval 154 ms    QRS Duration 94 ms    Q-T Interval 394 ms    QTC Calculation (Bezet) 460 ms    Calculated P Axis 71 degrees    Calculated R Axis -13 degrees    Calculated T Axis 69 degrees    Diagnosis       Normal sinus rhythm  Minimal voltage criteria for LVH, may be normal variant  Borderline ECG  When compared with ECG of 25-AUG-2017 14:26,  Premature ventricular complexes are no longer Present  Confirmed by Brii Vang ([id number]) on 6/8/2018 7:12:39 AM     PROTHROMBIN TIME + INR    Collection Time: 06/07/18  8:17 PM   Result Value Ref Range    Prothrombin time 16.1 (H) 11.5 - 14.5 sec    INR 1.4         Discharge Exam:  Patient Vitals for the past 24 hrs:   Temp Pulse Resp BP SpO2   06/08/18 0830 - (!) 105 20 159/76 97 %   06/08/18 0700 - (!) 106 (!) 31 169/78 (!) 89 %   06/08/18 0601 - (!) 102 20 157/73 90 %   06/08/18 0500 - (!) 102 18 155/74 93 %   06/08/18 0401 - (!) 101 17 148/70 94 %   06/08/18 0300 - (!) 107 20 150/69 100 %   06/08/18 0200 - (!) 104 18 149/70 100 %   06/08/18 0100 - 98 16 156/72 100 %   06/08/18 0038 - (!) 102 17 - 100 %   06/08/18 0000 98.3 °F (36.8 °C) - - - -   06/07/18 2348 - (!) 101 16 - 100 %   06/07/18 2330 - 99 14 - 100 %   06/07/18 2300 - 100 15 125/58 99 %   06/07/18 2245 - (!) 108 16 115/58 91 %   06/07/18 2230 - (!) 101 16 119/57 96 %   06/07/18 2215 - 100 20 116/58 99 %   06/07/18 2200 - (!) 101 15 114/59 99 %   06/07/18 2146 - (!) 106 15 113/53 (!) 60 %   06/07/18 2131 - (!) 108 18 112/56 97 %   06/07/18 2115 - (!) 104 19 123/58 94 %   06/07/18 2100 - (!) 102 (!) 59 119/58 100 %   06/07/18 2045 - (!) 104 (!) 39 128/50 100 %   06/07/18 2030 - (!) 101 19 131/63 98 %   06/07/18 2015 - (!) 101 16 130/61 96 %   06/07/18 2001 97.9 °F (36.6 °C) (!) 104 30 149/65 100 %   06/07/18 1946 - (!) 102 16 145/58 99 %   06/07/18 1931 - (!) 101 17 147/67 100 %   06/07/18 1922 - (!) 101 20 145/65 98 %   06/07/18 1915 - (!) 102 23 150/67 100 %     Oxygen Therapy  O2 Sat (%): 97 % (06/08/18 0830)  Pulse via Oximetry: 106 beats per minute (06/08/18 0830)  O2 Device: Nasal cannula (06/07/18 1930)  O2 Flow Rate (L/min): 3 l/min (06/07/18 1930)    Intake/Output Summary (Last 24 hours) at 06/08/18 1033  Last data filed at 06/08/18 9871 Gross per 24 hour   Intake                0 ml   Output             1600 ml   Net            -1600 ml          General appearance: NAD, lethargic, left eyebrow laceration  Eyes: anicteric sclerae, moist conjunctivae; no lid-lag, bilateral pupils now slowly reactive  ENT: Atraumatic; oropharynx clear with moist mucous membranes and no mucosal ulcerations; normal hard and soft palate  Neck: Trachea midline   FROM, supple, no thyromegaly or lymphadenopathy  Lungs: CTA, with normal respiratory effort and no intercostal retractions  CV: S1,S2 present, no added murmurs  Abdomen: Soft, non-tender; no masses   Extremities: No peripheral edema or extremity lymphadenopathy  Skin: Normal temperature, turgor and texture  Psych: Alert and oriented to person, place, lethargic  Discharge Info:   Current Discharge Medication List      STOP taking these medications       pregabalin (LYRICA) 100 mg capsule Comments:   Reason for Stopping:         rivaroxaban (XARELTO) 10 mg tablet Comments:   Reason for Stopping:         MYRBETRIQ 50 mg ER tablet Comments:   Reason for Stopping:         ondansetron (ZOFRAN ODT) 4 mg disintegrating tablet Comments:   Reason for Stopping:         esomeprazole (NEXIUM) 40 mg capsule Comments:   Reason for Stopping:         potassium chloride SR (KLOR-CON 10) 10 mEq tablet Comments:   Reason for Stopping:         FLUoxetine (PROZAC) 40 mg capsule Comments:   Reason for Stopping:         clopidogrel (PLAVIX) 75 mg tab Comments:   Reason for Stopping:         cephALEXin (KEFLEX) 250 mg capsule Comments:   Reason for Stopping:         pyridoxine, vitamin B6, (VITAMIN B-6) 50 mg tablet Comments:   Reason for Stopping:         acetaminophen (TYLENOL EXTRA STRENGTH) 500 mg tablet Comments:   Reason for Stopping:         memantine (NAMENDA) 10 mg tablet Comments:   Reason for Stopping:         LORazepam (ATIVAN) 1 mg tablet Comments:   Reason for Stopping:         levothyroxine (SYNTHROID) 25 mcg tablet Comments:   Reason for Stopping:         magnesium oxide (MAG-OX) 400 mg tablet Comments:   Reason for Stopping:         colestipol (COLESTID) 1 gram tablet Comments:   Reason for Stopping:         potassium chloride (KLOR-CON) 10 mEq tablet Comments:   Reason for Stopping:         pravastatin (PRAVACHOL) 40 mg tablet Comments:   Reason for Stopping:         multivitamins-minerals-lutein (CENTRUM SILVER) tab tablet Comments:   Reason for Stopping:         cyanocobalamin (VITAMIN B-12) 1,000 mcg tablet Comments:   Reason for Stopping:                 Disposition: hospice house    Activity: Activity as tolerated  Diet: DIET NPO EXCEPT MEDS    Follow-up Information     Follow up With Details Comments Contact Info    Carmelina Stallings MD   [address] [place], [place] [phone]              Time spent in patient discharge planning and coordination 45 minutes.     Signed:  Wan Montez MD

## 2018-06-08 NOTE — PHYSICIAN ADVISORY
Letter of Determination: Inpatient Status Appropriate    This patient was originally hospitalized as Inpatient Status on 6/7/2018 for subdural hematoma. This patient is appropriate for Inpatient Admission in accordance with CMS regulation Section 43 .3. Specifically, patient's stay is expected to be more than Two Midnights and was medically necessary. The patient's stay was medically necessary based on anticoagulation therapy with Xarelto, computed tomography scan demonstrating a 16 mm right frontoparietal subdural hemorrhage. Consistent with CMS guidelines, patient meets for inpatient status. It is our recommendation that this patient's hospitalization status should be INPATIENT status.      The final decision regarding the patient's hospitalization status depends on the attending physician's judgement.     Damaso Rodríguez MD, ESE,   Physician Fernando Olguin.

## 2018-06-08 NOTE — PROGRESS NOTES
Dariel spoke with Dr. Thompson Gave via telephone about comfort meds / DME order for semi-electric hospital bed given to [facility] / he will send to their DME company but states family states patient does not need bed in place prior to discharge. Will set up [facility] transport for patient after receiving prescriptions.

## 2018-06-08 NOTE — PROGRESS NOTES
Dariel with [facility] to room to evaluate patient and speak with family.

## 2018-06-08 NOTE — PROGRESS NOTES
I met with family members at bedside. Also I reviewed Dr. Eligio Barroso note. Family would like patient to be comfort measures at this time. I gave my sympathies to all of them in this difficult situation. They would like home hospice at ultimate disposition. I explained this service will be contacted tomorrow morning. I gave them details about what comfort measures mean and what Hospice services include. I examined patient: she had minimal reactive pupils; non responsive to verbal stimuli, she had minimal response to painful stimuli. Cardiac: normal s1,s2, no murmurs; lungs: clear; abdomen: soft; neurology: not able to move extremities spontaneously.      Plan:  Apply comfort measures-DNR/DNI  Morphine/robinul  Hospice team in am- family would like home hospice

## 2018-06-08 NOTE — HOSPICE
Met with family at bedside to discuss hospice philosophy, care, management of symptoms and medications. Case discussed with provider at [facility] and it is recommended that patient transfer to hospice house for care. Daughter agreed but then decided they wanted to take her home. Re discussed with provider and approved for home hospice, however family would like to have time to look at other hospice companies and paid caregivers. Brochure and contact number left with daughter.  Thank you for this referral.  KATY Ascencio isaac Nurse Liaison  [facility]  [phone]

## 2018-06-08 NOTE — PROGRESS NOTES
Patient to be discharged to [facility] and primary RN to call report at time of discharge.

## 2018-06-08 NOTE — PROGRESS NOTES
Pt. Remains stable throughout shift. Continues to respond occasionally to questions and follows some commands. No  or meaningful movement with left extremities. Family remained with her through the night, patient denies pain and tolerated turns and partial bathing well. Will continue to assess.

## 2018-06-09 LAB
BLD PROD TYP BPU: NORMAL
BLD PROD TYP BPU: NORMAL
BPU ID: NORMAL
BPU ID: NORMAL
STATUS OF UNIT,%ST: NORMAL
STATUS OF UNIT,%ST: NORMAL
UNIT DIVISION, %UDIV: 0
UNIT DIVISION, %UDIV: 0

## 2018-06-12 ENCOUNTER — PATIENT OUTREACH (OUTPATIENT)
Dept: CASE MANAGEMENT | Age: 73
End: 2018-06-12

## 2018-06-12 NOTE — PROGRESS NOTES
Please note this patient was IP d/c to Hospice. No further ANKUR indicated. This note will not be viewable in [address] Suki Madsen LPN/ Care Coordinator  [facility] [address]  [email or web address]

## 2021-08-03 PROBLEM — E78.5 HLD (HYPERLIPIDEMIA): Status: RESOLVED | Noted: 2021-08-03 | Resolved: 2021-08-03
